# Patient Record
Sex: MALE | Race: OTHER | Employment: UNEMPLOYED | URBAN - METROPOLITAN AREA
[De-identification: names, ages, dates, MRNs, and addresses within clinical notes are randomized per-mention and may not be internally consistent; named-entity substitution may affect disease eponyms.]

---

## 2021-01-01 ENCOUNTER — APPOINTMENT (OUTPATIENT)
Dept: CT IMAGING | Age: 56
DRG: 720 | End: 2021-01-01
Payer: MEDICAID

## 2021-01-01 ENCOUNTER — APPOINTMENT (OUTPATIENT)
Dept: GENERAL RADIOLOGY | Age: 56
DRG: 720 | End: 2021-01-01
Payer: MEDICAID

## 2021-01-01 ENCOUNTER — APPOINTMENT (OUTPATIENT)
Dept: INTERVENTIONAL RADIOLOGY/VASCULAR | Age: 56
DRG: 720 | End: 2021-01-01
Payer: MEDICAID

## 2021-01-01 ENCOUNTER — APPOINTMENT (OUTPATIENT)
Dept: ULTRASOUND IMAGING | Age: 56
DRG: 720 | End: 2021-01-01
Payer: MEDICAID

## 2021-01-01 ENCOUNTER — HOSPITAL ENCOUNTER (INPATIENT)
Age: 56
LOS: 11 days | DRG: 720 | End: 2021-09-21
Attending: EMERGENCY MEDICINE | Admitting: INTERNAL MEDICINE
Payer: MEDICAID

## 2021-01-01 VITALS
DIASTOLIC BLOOD PRESSURE: 30 MMHG | BODY MASS INDEX: 31.77 KG/M2 | SYSTOLIC BLOOD PRESSURE: 59 MMHG | WEIGHT: 190.7 LBS | HEART RATE: 51 BPM | RESPIRATION RATE: 12 BRPM | TEMPERATURE: 99.7 F | OXYGEN SATURATION: 71 % | HEIGHT: 65 IN

## 2021-01-01 DIAGNOSIS — K92.2 GASTROINTESTINAL HEMORRHAGE, UNSPECIFIED GASTROINTESTINAL HEMORRHAGE TYPE: ICD-10-CM

## 2021-01-01 DIAGNOSIS — K72.91 LIVER FAILURE WITH HEPATIC COMA, UNSPECIFIED CHRONICITY (HCC): ICD-10-CM

## 2021-01-01 DIAGNOSIS — K76.82 HEPATIC ENCEPHALOPATHY: Primary | ICD-10-CM

## 2021-01-01 DIAGNOSIS — K82.8 THICKENING OF WALL OF GALLBLADDER: ICD-10-CM

## 2021-01-01 LAB
A/G RATIO: 0.8 (ref 1.1–2.2)
A/G RATIO: 1.3 (ref 1.1–2.2)
A/G RATIO: 1.5 (ref 1.1–2.2)
A/G RATIO: 1.6 (ref 1.1–2.2)
A/G RATIO: 1.7 (ref 1.1–2.2)
A/G RATIO: 1.7 (ref 1.1–2.2)
A/G RATIO: 1.8 (ref 1.1–2.2)
A/G RATIO: 2.3 (ref 1.1–2.2)
A/G RATIO: 2.4 (ref 1.1–2.2)
A/G RATIO: 2.5 (ref 1.1–2.2)
A/G RATIO: 2.6 (ref 1.1–2.2)
A/G RATIO: 3.2 (ref 1.1–2.2)
A/G RATIO: 3.2 (ref 1.1–2.2)
ABO/RH: NORMAL
ACETAMINOPHEN LEVEL: <5 UG/ML (ref 10–30)
ALBUMIN FLUID: 0.6 G/DL
ALBUMIN SERPL-MCNC: 2.2 G/DL (ref 3.4–5)
ALBUMIN SERPL-MCNC: 2.5 G/DL (ref 3.4–5)
ALBUMIN SERPL-MCNC: 2.6 G/DL (ref 3.4–5)
ALBUMIN SERPL-MCNC: 2.6 G/DL (ref 3.4–5)
ALBUMIN SERPL-MCNC: 2.7 G/DL (ref 3.4–5)
ALBUMIN SERPL-MCNC: 2.8 G/DL (ref 3.4–5)
ALBUMIN SERPL-MCNC: 2.9 G/DL (ref 3.4–5)
ALBUMIN SERPL-MCNC: 3.1 G/DL (ref 3.4–5)
ALBUMIN SERPL-MCNC: 3.2 G/DL (ref 3.4–5)
ALBUMIN SERPL-MCNC: 3.3 G/DL (ref 3.4–5)
ALBUMIN SERPL-MCNC: 3.4 G/DL (ref 3.4–5)
ALBUMIN SERPL-MCNC: 3.5 G/DL (ref 3.4–5)
ALBUMIN SERPL-MCNC: 3.5 G/DL (ref 3.4–5)
ALP BLD-CCNC: 149 U/L (ref 40–129)
ALP BLD-CCNC: 162 U/L (ref 40–129)
ALP BLD-CCNC: 164 U/L (ref 40–129)
ALP BLD-CCNC: 181 U/L (ref 40–129)
ALP BLD-CCNC: 182 U/L (ref 40–129)
ALP BLD-CCNC: 182 U/L (ref 40–129)
ALP BLD-CCNC: 183 U/L (ref 40–129)
ALP BLD-CCNC: 184 U/L (ref 40–129)
ALP BLD-CCNC: 192 U/L (ref 40–129)
ALP BLD-CCNC: 202 U/L (ref 40–129)
ALP BLD-CCNC: 217 U/L (ref 40–129)
ALP BLD-CCNC: 247 U/L (ref 40–129)
ALP BLD-CCNC: 312 U/L (ref 40–129)
ALT SERPL-CCNC: 114 U/L (ref 10–40)
ALT SERPL-CCNC: 116 U/L (ref 10–40)
ALT SERPL-CCNC: 120 U/L (ref 10–40)
ALT SERPL-CCNC: 153 U/L (ref 10–40)
ALT SERPL-CCNC: 181 U/L (ref 10–40)
ALT SERPL-CCNC: 183 U/L (ref 10–40)
ALT SERPL-CCNC: 196 U/L (ref 10–40)
ALT SERPL-CCNC: 256 U/L (ref 10–40)
ALT SERPL-CCNC: 91 U/L (ref 10–40)
ALT SERPL-CCNC: 91 U/L (ref 10–40)
ALT SERPL-CCNC: 93 U/L (ref 10–40)
ALT SERPL-CCNC: 96 U/L (ref 10–40)
ALT SERPL-CCNC: 97 U/L (ref 10–40)
AMMONIA: 136 UMOL/L (ref 16–60)
AMMONIA: 45 UMOL/L (ref 16–60)
AMMONIA: 50 UMOL/L (ref 16–60)
AMMONIA: 50 UMOL/L (ref 16–60)
AMMONIA: 55 UMOL/L (ref 16–60)
AMMONIA: 60 UMOL/L (ref 16–60)
AMMONIA: 64 UMOL/L (ref 16–60)
AMMONIA: 64 UMOL/L (ref 16–60)
AMMONIA: 72 UMOL/L (ref 16–60)
AMMONIA: 75 UMOL/L (ref 16–60)
AMMONIA: 76 UMOL/L (ref 16–60)
AMMONIA: 86 UMOL/L (ref 16–60)
AMPHETAMINE SCREEN, URINE: NORMAL
ANION GAP SERPL CALCULATED.3IONS-SCNC: 11 MMOL/L (ref 3–16)
ANION GAP SERPL CALCULATED.3IONS-SCNC: 11 MMOL/L (ref 3–16)
ANION GAP SERPL CALCULATED.3IONS-SCNC: 13 MMOL/L (ref 3–16)
ANION GAP SERPL CALCULATED.3IONS-SCNC: 14 MMOL/L (ref 3–16)
ANION GAP SERPL CALCULATED.3IONS-SCNC: 15 MMOL/L (ref 3–16)
ANION GAP SERPL CALCULATED.3IONS-SCNC: 16 MMOL/L (ref 3–16)
ANION GAP SERPL CALCULATED.3IONS-SCNC: 18 MMOL/L (ref 3–16)
ANION GAP SERPL CALCULATED.3IONS-SCNC: 23 MMOL/L (ref 3–16)
ANION GAP SERPL CALCULATED.3IONS-SCNC: 27 MMOL/L (ref 3–16)
ANISOCYTOSIS: ABNORMAL
ANTIBODY SCREEN: NORMAL
APPEARANCE FLUID: CLEAR
AST SERPL-CCNC: 103 U/L (ref 15–37)
AST SERPL-CCNC: 104 U/L (ref 15–37)
AST SERPL-CCNC: 116 U/L (ref 15–37)
AST SERPL-CCNC: 157 U/L (ref 15–37)
AST SERPL-CCNC: 161 U/L (ref 15–37)
AST SERPL-CCNC: 176 U/L (ref 15–37)
AST SERPL-CCNC: 180 U/L (ref 15–37)
AST SERPL-CCNC: 215 U/L (ref 15–37)
AST SERPL-CCNC: 220 U/L (ref 15–37)
AST SERPL-CCNC: 258 U/L (ref 15–37)
AST SERPL-CCNC: 286 U/L (ref 15–37)
AST SERPL-CCNC: 292 U/L (ref 15–37)
AST SERPL-CCNC: 464 U/L (ref 15–37)
BACTERIA: ABNORMAL /HPF
BANDED NEUTROPHILS RELATIVE PERCENT: 1 % (ref 0–7)
BANDED NEUTROPHILS RELATIVE PERCENT: 10 % (ref 0–7)
BANDED NEUTROPHILS RELATIVE PERCENT: 11 % (ref 0–7)
BANDED NEUTROPHILS RELATIVE PERCENT: 3 % (ref 0–7)
BANDED NEUTROPHILS RELATIVE PERCENT: 3 % (ref 0–7)
BANDED NEUTROPHILS RELATIVE PERCENT: 4 % (ref 0–7)
BARBITURATE SCREEN URINE: NORMAL
BASE EXCESS ARTERIAL: -11 MMOL/L (ref -3–3)
BASE EXCESS ARTERIAL: -3.4 MMOL/L (ref -3–3)
BASE EXCESS ARTERIAL: -4 MMOL/L (ref -3–3)
BASE EXCESS ARTERIAL: -4 MMOL/L (ref -3–3)
BASE EXCESS ARTERIAL: -4.1 MMOL/L (ref -3–3)
BASE EXCESS ARTERIAL: -4.1 MMOL/L (ref -3–3)
BASE EXCESS ARTERIAL: -4.7 MMOL/L (ref -3–3)
BASE EXCESS ARTERIAL: -5 MMOL/L (ref -3–3)
BASE EXCESS ARTERIAL: -5.8 MMOL/L (ref -3–3)
BASE EXCESS ARTERIAL: -6.7 MMOL/L (ref -3–3)
BASE EXCESS ARTERIAL: -7.3 MMOL/L (ref -3–3)
BASE EXCESS ARTERIAL: -9.1 MMOL/L (ref -3–3)
BASE EXCESS VENOUS: -11.1 MMOL/L (ref -3–3)
BASOPHILIC STIPPLING: ABNORMAL
BASOPHILIC STIPPLING: ABNORMAL
BASOPHILS ABSOLUTE: 0 K/UL (ref 0–0.2)
BASOPHILS ABSOLUTE: 0.1 K/UL (ref 0–0.2)
BASOPHILS ABSOLUTE: 0.1 K/UL (ref 0–0.2)
BASOPHILS RELATIVE PERCENT: 0 %
BASOPHILS RELATIVE PERCENT: 0.1 %
BASOPHILS RELATIVE PERCENT: 0.3 %
BASOPHILS RELATIVE PERCENT: 1 %
BENZODIAZEPINE SCREEN, URINE: NORMAL
BILIRUB SERPL-MCNC: 24.5 MG/DL (ref 0–1)
BILIRUB SERPL-MCNC: 25.3 MG/DL (ref 0–1)
BILIRUB SERPL-MCNC: 25.5 MG/DL (ref 0–1)
BILIRUB SERPL-MCNC: 25.5 MG/DL (ref 0–1)
BILIRUB SERPL-MCNC: 26.2 MG/DL (ref 0–1)
BILIRUB SERPL-MCNC: 27.1 MG/DL (ref 0–1)
BILIRUB SERPL-MCNC: 27.2 MG/DL (ref 0–1)
BILIRUB SERPL-MCNC: 27.7 MG/DL (ref 0–1)
BILIRUB SERPL-MCNC: 27.8 MG/DL (ref 0–1)
BILIRUB SERPL-MCNC: 27.9 MG/DL (ref 0–1)
BILIRUB SERPL-MCNC: 28.2 MG/DL (ref 0–1)
BILIRUB SERPL-MCNC: 30.5 MG/DL (ref 0–1)
BILIRUB SERPL-MCNC: 31.1 MG/DL (ref 0–1)
BILIRUBIN URINE: ABNORMAL
BLOOD BANK DISPENSE STATUS: NORMAL
BLOOD BANK PRODUCT CODE: NORMAL
BLOOD CULTURE, ROUTINE: ABNORMAL
BLOOD CULTURE, ROUTINE: ABNORMAL
BLOOD, URINE: ABNORMAL
BODY FLUID CULTURE, STERILE: NORMAL
BPU ID: NORMAL
BUN BLDV-MCNC: 54 MG/DL (ref 7–20)
BUN BLDV-MCNC: 55 MG/DL (ref 7–20)
BUN BLDV-MCNC: 57 MG/DL (ref 7–20)
BUN BLDV-MCNC: 58 MG/DL (ref 7–20)
BUN BLDV-MCNC: 59 MG/DL (ref 7–20)
BUN BLDV-MCNC: 59 MG/DL (ref 7–20)
BUN BLDV-MCNC: 67 MG/DL (ref 7–20)
BUN BLDV-MCNC: 70 MG/DL (ref 7–20)
BUN BLDV-MCNC: 70 MG/DL (ref 7–20)
BUN BLDV-MCNC: 71 MG/DL (ref 7–20)
BUN BLDV-MCNC: 72 MG/DL (ref 7–20)
BUN BLDV-MCNC: 75 MG/DL (ref 7–20)
BUN BLDV-MCNC: 79 MG/DL (ref 7–20)
BUN BLDV-MCNC: 87 MG/DL (ref 7–20)
BURR CELLS: ABNORMAL
C DIFF TOXIN/ANTIGEN: NORMAL
CALCIUM SERPL-MCNC: 6.9 MG/DL (ref 8.3–10.6)
CALCIUM SERPL-MCNC: 7.4 MG/DL (ref 8.3–10.6)
CALCIUM SERPL-MCNC: 7.5 MG/DL (ref 8.3–10.6)
CALCIUM SERPL-MCNC: 7.6 MG/DL (ref 8.3–10.6)
CALCIUM SERPL-MCNC: 7.8 MG/DL (ref 8.3–10.6)
CALCIUM SERPL-MCNC: 8.2 MG/DL (ref 8.3–10.6)
CALCIUM SERPL-MCNC: 9 MG/DL (ref 8.3–10.6)
CALCIUM SERPL-MCNC: 9.3 MG/DL (ref 8.3–10.6)
CALCIUM SERPL-MCNC: 9.5 MG/DL (ref 8.3–10.6)
CALCIUM SERPL-MCNC: 9.5 MG/DL (ref 8.3–10.6)
CALCIUM SERPL-MCNC: 9.6 MG/DL (ref 8.3–10.6)
CALCIUM SERPL-MCNC: 9.6 MG/DL (ref 8.3–10.6)
CALCIUM SERPL-MCNC: 9.7 MG/DL (ref 8.3–10.6)
CALCIUM SERPL-MCNC: 9.7 MG/DL (ref 8.3–10.6)
CANNABINOID SCREEN URINE: NORMAL
CARBOXYHEMOGLOBIN ARTERIAL: 0.8 % (ref 0–1.5)
CARBOXYHEMOGLOBIN ARTERIAL: 1 % (ref 0–1.5)
CARBOXYHEMOGLOBIN ARTERIAL: 1 % (ref 0–1.5)
CARBOXYHEMOGLOBIN ARTERIAL: 1.1 % (ref 0–1.5)
CARBOXYHEMOGLOBIN ARTERIAL: 1.2 % (ref 0–1.5)
CARBOXYHEMOGLOBIN ARTERIAL: 1.4 % (ref 0–1.5)
CARBOXYHEMOGLOBIN ARTERIAL: 1.4 % (ref 0–1.5)
CARBOXYHEMOGLOBIN ARTERIAL: 1.6 % (ref 0–1.5)
CARBOXYHEMOGLOBIN ARTERIAL: 1.6 % (ref 0–1.5)
CARBOXYHEMOGLOBIN ARTERIAL: 1.7 % (ref 0–1.5)
CARBOXYHEMOGLOBIN: 3.6 % (ref 0–1.5)
CELL COUNT FLUID TYPE: NORMAL
CELLULAR CASTS: ABNORMAL /LPF
CHLORIDE BLD-SCNC: 102 MMOL/L (ref 99–110)
CHLORIDE BLD-SCNC: 103 MMOL/L (ref 99–110)
CHLORIDE BLD-SCNC: 108 MMOL/L (ref 99–110)
CHLORIDE BLD-SCNC: 111 MMOL/L (ref 99–110)
CHLORIDE BLD-SCNC: 116 MMOL/L (ref 99–110)
CHLORIDE BLD-SCNC: 117 MMOL/L (ref 99–110)
CHLORIDE BLD-SCNC: 119 MMOL/L (ref 99–110)
CHLORIDE BLD-SCNC: 121 MMOL/L (ref 99–110)
CHLORIDE BLD-SCNC: 122 MMOL/L (ref 99–110)
CHLORIDE BLD-SCNC: 82 MMOL/L (ref 99–110)
CHLORIDE BLD-SCNC: 82 MMOL/L (ref 99–110)
CHLORIDE BLD-SCNC: 93 MMOL/L (ref 99–110)
CHLORIDE BLD-SCNC: 98 MMOL/L (ref 99–110)
CHLORIDE BLD-SCNC: 99 MMOL/L (ref 99–110)
CLARITY: ABNORMAL
CLOT EVALUATION: NORMAL
CO2: 13 MMOL/L (ref 21–32)
CO2: 14 MMOL/L (ref 21–32)
CO2: 16 MMOL/L (ref 21–32)
CO2: 17 MMOL/L (ref 21–32)
CO2: 19 MMOL/L (ref 21–32)
CO2: 20 MMOL/L (ref 21–32)
CO2: 21 MMOL/L (ref 21–32)
COCAINE METABOLITE SCREEN URINE: NORMAL
COLOR FLUID: YELLOW
COLOR: ABNORMAL
COMMENT UA: ABNORMAL
CREAT SERPL-MCNC: 1.1 MG/DL (ref 0.9–1.3)
CREAT SERPL-MCNC: 1.2 MG/DL (ref 0.9–1.3)
CREAT SERPL-MCNC: 1.7 MG/DL (ref 0.9–1.3)
CREAT SERPL-MCNC: 1.9 MG/DL (ref 0.9–1.3)
CREAT SERPL-MCNC: 2 MG/DL (ref 0.9–1.3)
CREAT SERPL-MCNC: 2 MG/DL (ref 0.9–1.3)
CREAT SERPL-MCNC: 2.2 MG/DL (ref 0.9–1.3)
CREAT SERPL-MCNC: 2.3 MG/DL (ref 0.9–1.3)
CREAT SERPL-MCNC: 2.3 MG/DL (ref 0.9–1.3)
CREAT SERPL-MCNC: 2.7 MG/DL (ref 0.9–1.3)
CREAT SERPL-MCNC: 2.8 MG/DL (ref 0.9–1.3)
CREAT SERPL-MCNC: 3.2 MG/DL (ref 0.9–1.3)
CREAT SERPL-MCNC: 3.3 MG/DL (ref 0.9–1.3)
CREAT SERPL-MCNC: 3.4 MG/DL (ref 0.9–1.3)
CULTURE, BLOOD 2: ABNORMAL
CULTURE, BLOOD 2: NORMAL
DESCRIPTION BLOOD BANK: NORMAL
EKG ATRIAL RATE: 65 BPM
EKG ATRIAL RATE: 66 BPM
EKG ATRIAL RATE: 82 BPM
EKG DIAGNOSIS: NORMAL
EKG P AXIS: 23 DEGREES
EKG P AXIS: 28 DEGREES
EKG P AXIS: 38 DEGREES
EKG P-R INTERVAL: 140 MS
EKG P-R INTERVAL: 144 MS
EKG P-R INTERVAL: 150 MS
EKG Q-T INTERVAL: 414 MS
EKG Q-T INTERVAL: 430 MS
EKG Q-T INTERVAL: 436 MS
EKG QRS DURATION: 110 MS
EKG QRS DURATION: 116 MS
EKG QRS DURATION: 122 MS
EKG QTC CALCULATION (BAZETT): 447 MS
EKG QTC CALCULATION (BAZETT): 457 MS
EKG QTC CALCULATION (BAZETT): 483 MS
EKG R AXIS: -11 DEGREES
EKG R AXIS: -12 DEGREES
EKG R AXIS: -12 DEGREES
EKG T AXIS: 14 DEGREES
EKG T AXIS: 16 DEGREES
EKG T AXIS: 72 DEGREES
EKG VENTRICULAR RATE: 65 BPM
EKG VENTRICULAR RATE: 66 BPM
EKG VENTRICULAR RATE: 82 BPM
EOSINOPHILS ABSOLUTE: 0 K/UL (ref 0–0.6)
EOSINOPHILS ABSOLUTE: 0.1 K/UL (ref 0–0.6)
EOSINOPHILS ABSOLUTE: 0.1 K/UL (ref 0–0.6)
EOSINOPHILS ABSOLUTE: 0.2 K/UL (ref 0–0.6)
EOSINOPHILS ABSOLUTE: 0.2 K/UL (ref 0–0.6)
EOSINOPHILS ABSOLUTE: 0.3 K/UL (ref 0–0.6)
EOSINOPHILS ABSOLUTE: 0.3 K/UL (ref 0–0.6)
EOSINOPHILS ABSOLUTE: 0.8 K/UL (ref 0–0.6)
EOSINOPHILS RELATIVE PERCENT: 0 %
EOSINOPHILS RELATIVE PERCENT: 0.1 %
EOSINOPHILS RELATIVE PERCENT: 0.3 %
EOSINOPHILS RELATIVE PERCENT: 0.3 %
EOSINOPHILS RELATIVE PERCENT: 0.6 %
EOSINOPHILS RELATIVE PERCENT: 0.7 %
EOSINOPHILS RELATIVE PERCENT: 1 %
EOSINOPHILS RELATIVE PERCENT: 1 %
EOSINOPHILS RELATIVE PERCENT: 3 %
EOSINOPHILS RELATIVE PERCENT: 4 %
EPITHELIAL CELLS, UA: ABNORMAL /HPF (ref 0–5)
ETHANOL: NORMAL MG/DL (ref 0–0.08)
FLUID TYPE: NORMAL
GFR AFRICAN AMERICAN: 23
GFR AFRICAN AMERICAN: 24
GFR AFRICAN AMERICAN: 24
GFR AFRICAN AMERICAN: 29
GFR AFRICAN AMERICAN: 30
GFR AFRICAN AMERICAN: 36
GFR AFRICAN AMERICAN: 36
GFR AFRICAN AMERICAN: 38
GFR AFRICAN AMERICAN: 42
GFR AFRICAN AMERICAN: 42
GFR AFRICAN AMERICAN: 45
GFR AFRICAN AMERICAN: 51
GFR AFRICAN AMERICAN: >60
GFR AFRICAN AMERICAN: >60
GFR NON-AFRICAN AMERICAN: 19
GFR NON-AFRICAN AMERICAN: 20
GFR NON-AFRICAN AMERICAN: 20
GFR NON-AFRICAN AMERICAN: 24
GFR NON-AFRICAN AMERICAN: 25
GFR NON-AFRICAN AMERICAN: 30
GFR NON-AFRICAN AMERICAN: 30
GFR NON-AFRICAN AMERICAN: 31
GFR NON-AFRICAN AMERICAN: 35
GFR NON-AFRICAN AMERICAN: 35
GFR NON-AFRICAN AMERICAN: 37
GFR NON-AFRICAN AMERICAN: 42
GFR NON-AFRICAN AMERICAN: >60
GFR NON-AFRICAN AMERICAN: >60
GLOBULIN: 1.1 G/DL
GLOBULIN: 1.1 G/DL
GLOBULIN: 1.2 G/DL
GLOBULIN: 1.3 G/DL
GLOBULIN: 1.3 G/DL
GLOBULIN: 1.4 G/DL
GLOBULIN: 1.5 G/DL
GLOBULIN: 1.6 G/DL
GLOBULIN: 1.6 G/DL
GLOBULIN: 1.7 G/DL
GLOBULIN: 1.9 G/DL
GLOBULIN: 1.9 G/DL
GLOBULIN: 2.6 G/DL
GLUCOSE BLD-MCNC: 101 MG/DL (ref 70–99)
GLUCOSE BLD-MCNC: 104 MG/DL (ref 70–99)
GLUCOSE BLD-MCNC: 107 MG/DL (ref 70–99)
GLUCOSE BLD-MCNC: 110 MG/DL (ref 70–99)
GLUCOSE BLD-MCNC: 118 MG/DL (ref 70–99)
GLUCOSE BLD-MCNC: 120 MG/DL (ref 70–99)
GLUCOSE BLD-MCNC: 127 MG/DL (ref 70–99)
GLUCOSE BLD-MCNC: 128 MG/DL (ref 70–99)
GLUCOSE BLD-MCNC: 149 MG/DL (ref 70–99)
GLUCOSE BLD-MCNC: 153 MG/DL (ref 70–99)
GLUCOSE BLD-MCNC: 164 MG/DL (ref 70–99)
GLUCOSE BLD-MCNC: 46 MG/DL (ref 70–99)
GLUCOSE BLD-MCNC: 58 MG/DL (ref 70–99)
GLUCOSE BLD-MCNC: 85 MG/DL (ref 70–99)
GLUCOSE BLD-MCNC: 92 MG/DL (ref 70–99)
GLUCOSE BLD-MCNC: 99 MG/DL (ref 70–99)
GLUCOSE URINE: 100 MG/DL
GRAM STAIN RESULT: NORMAL
HAV IGM SER IA-ACNC: NORMAL
HCO3 ARTERIAL: 16.1 MMOL/L (ref 21–29)
HCO3 ARTERIAL: 17.3 MMOL/L (ref 21–29)
HCO3 ARTERIAL: 17.8 MMOL/L (ref 21–29)
HCO3 ARTERIAL: 18.7 MMOL/L (ref 21–29)
HCO3 ARTERIAL: 19.4 MMOL/L (ref 21–29)
HCO3 ARTERIAL: 19.9 MMOL/L (ref 21–29)
HCO3 ARTERIAL: 20.5 MMOL/L (ref 21–29)
HCO3 ARTERIAL: 20.7 MMOL/L (ref 21–29)
HCO3 ARTERIAL: 21.3 MMOL/L (ref 21–29)
HCO3 ARTERIAL: 21.4 MMOL/L (ref 21–29)
HCO3 ARTERIAL: 21.4 MMOL/L (ref 21–29)
HCO3 ARTERIAL: 21.5 MMOL/L (ref 21–29)
HCO3 VENOUS: 14.3 MMOL/L (ref 23–29)
HCT VFR BLD CALC: 17 % (ref 40.5–52.5)
HCT VFR BLD CALC: 19.3 % (ref 40.5–52.5)
HCT VFR BLD CALC: 20.5 % (ref 40.5–52.5)
HCT VFR BLD CALC: 20.9 % (ref 40.5–52.5)
HCT VFR BLD CALC: 21 % (ref 40.5–52.5)
HCT VFR BLD CALC: 21.3 % (ref 40.5–52.5)
HCT VFR BLD CALC: 21.3 % (ref 40.5–52.5)
HCT VFR BLD CALC: 21.7 % (ref 40.5–52.5)
HCT VFR BLD CALC: 21.8 % (ref 40.5–52.5)
HCT VFR BLD CALC: 21.9 % (ref 40.5–52.5)
HCT VFR BLD CALC: 21.9 % (ref 40.5–52.5)
HCT VFR BLD CALC: 22.1 % (ref 40.5–52.5)
HCT VFR BLD CALC: 22.1 % (ref 40.5–52.5)
HCT VFR BLD CALC: 22.3 % (ref 40.5–52.5)
HCT VFR BLD CALC: 22.4 % (ref 40.5–52.5)
HCT VFR BLD CALC: 22.4 % (ref 40.5–52.5)
HCT VFR BLD CALC: 22.5 % (ref 40.5–52.5)
HCT VFR BLD CALC: 22.5 % (ref 40.5–52.5)
HCT VFR BLD CALC: 22.6 % (ref 40.5–52.5)
HCT VFR BLD CALC: 22.9 % (ref 40.5–52.5)
HCT VFR BLD CALC: 23.3 % (ref 40.5–52.5)
HCT VFR BLD CALC: 24.7 % (ref 40.5–52.5)
HCT VFR BLD CALC: 25.5 % (ref 40.5–52.5)
HCT VFR BLD CALC: 35 % (ref 40.5–52.5)
HCT VFR BLD CALC: 35.1 % (ref 40.5–52.5)
HEMOGLOBIN, ART, EXTENDED: 6 G/DL (ref 13.5–17.5)
HEMOGLOBIN, ART, EXTENDED: 6.5 G/DL (ref 13.5–17.5)
HEMOGLOBIN, ART, EXTENDED: 6.6 G/DL (ref 13.5–17.5)
HEMOGLOBIN, ART, EXTENDED: 7 G/DL (ref 13.5–17.5)
HEMOGLOBIN, ART, EXTENDED: 7.1 G/DL (ref 13.5–17.5)
HEMOGLOBIN, ART, EXTENDED: 7.2 G/DL (ref 13.5–17.5)
HEMOGLOBIN, ART, EXTENDED: 7.4 G/DL (ref 13.5–17.5)
HEMOGLOBIN, ART, EXTENDED: 7.4 G/DL (ref 13.5–17.5)
HEMOGLOBIN, ART, EXTENDED: 7.5 G/DL (ref 13.5–17.5)
HEMOGLOBIN, ART, EXTENDED: 7.6 G/DL (ref 13.5–17.5)
HEMOGLOBIN, ART, EXTENDED: 8.3 G/DL (ref 13.5–17.5)
HEMOGLOBIN, ART, EXTENDED: 9.5 G/DL (ref 13.5–17.5)
HEMOGLOBIN, VEN, REDUCED: 3 %
HEMOGLOBIN: 12.1 G/DL (ref 13.5–17.5)
HEMOGLOBIN: 12.2 G/DL (ref 13.5–17.5)
HEMOGLOBIN: 6 G/DL (ref 13.5–17.5)
HEMOGLOBIN: 6.9 G/DL (ref 13.5–17.5)
HEMOGLOBIN: 7.2 G/DL (ref 13.5–17.5)
HEMOGLOBIN: 7.2 G/DL (ref 13.5–17.5)
HEMOGLOBIN: 7.3 G/DL (ref 13.5–17.5)
HEMOGLOBIN: 7.3 G/DL (ref 13.5–17.5)
HEMOGLOBIN: 7.4 G/DL (ref 13.5–17.5)
HEMOGLOBIN: 7.6 G/DL (ref 13.5–17.5)
HEMOGLOBIN: 7.8 G/DL (ref 13.5–17.5)
HEMOGLOBIN: 7.9 G/DL (ref 13.5–17.5)
HEMOGLOBIN: 8 G/DL (ref 13.5–17.5)
HEMOGLOBIN: 8.1 G/DL (ref 13.5–17.5)
HEMOGLOBIN: 8.4 G/DL (ref 13.5–17.5)
HEMOGLOBIN: 8.6 G/DL (ref 13.5–17.5)
HEPATITIS B CORE IGM ANTIBODY: NORMAL
HEPATITIS B SURFACE ANTIGEN INTERPRETATION: NORMAL
HEPATITIS C ANTIBODY INTERPRETATION: NORMAL
HIV AG/AB: NORMAL
HIV ANTIGEN: NORMAL
HIV-1 ANTIBODY: NORMAL
HIV-2 AB: NORMAL
HYALINE CASTS: ABNORMAL /LPF (ref 0–2)
HYPOCHROMIA: ABNORMAL
INR BLD: 2.28 (ref 0.88–1.12)
INR BLD: 2.6 (ref 0.88–1.12)
INR BLD: 2.68 (ref 0.88–1.12)
INR BLD: 2.76 (ref 0.88–1.12)
INR BLD: 2.81 (ref 0.88–1.12)
INR BLD: 2.85 (ref 0.88–1.12)
INR BLD: 2.88 (ref 0.88–1.12)
INR BLD: 2.94 (ref 0.88–1.12)
INR BLD: 3.01 (ref 0.88–1.12)
INR BLD: 3.03 (ref 0.88–1.12)
INR BLD: 3.04 (ref 0.88–1.12)
INR BLD: 4.12 (ref 0.88–1.12)
INR BLD: 4.16 (ref 0.88–1.12)
KETONES, URINE: ABNORMAL MG/DL
LACTIC ACID, SEPSIS: 8.5 MMOL/L (ref 0.4–1.9)
LACTIC ACID: 1 MMOL/L (ref 0.4–2)
LACTIC ACID: 1.1 MMOL/L (ref 0.4–2)
LACTIC ACID: 1.2 MMOL/L (ref 0.4–2)
LACTIC ACID: 1.3 MMOL/L (ref 0.4–2)
LACTIC ACID: 1.4 MMOL/L (ref 0.4–2)
LACTIC ACID: 1.4 MMOL/L (ref 0.4–2)
LACTIC ACID: 1.5 MMOL/L (ref 0.4–2)
LACTIC ACID: 1.6 MMOL/L (ref 0.4–2)
LACTIC ACID: 1.6 MMOL/L (ref 0.4–2)
LACTIC ACID: 4.7 MMOL/L (ref 0.4–2)
LEUKOCYTE ESTERASE, URINE: NEGATIVE
LIPASE: 166 U/L (ref 13–60)
LYMPHOCYTES ABSOLUTE: 0.3 K/UL (ref 1–5.1)
LYMPHOCYTES ABSOLUTE: 0.5 K/UL (ref 1–5.1)
LYMPHOCYTES ABSOLUTE: 0.5 K/UL (ref 1–5.1)
LYMPHOCYTES ABSOLUTE: 0.6 K/UL (ref 1–5.1)
LYMPHOCYTES ABSOLUTE: 0.7 K/UL (ref 1–5.1)
LYMPHOCYTES ABSOLUTE: 0.8 K/UL (ref 1–5.1)
LYMPHOCYTES ABSOLUTE: 0.8 K/UL (ref 1–5.1)
LYMPHOCYTES ABSOLUTE: 0.9 K/UL (ref 1–5.1)
LYMPHOCYTES ABSOLUTE: 1 K/UL (ref 1–5.1)
LYMPHOCYTES ABSOLUTE: 1.2 K/UL (ref 1–5.1)
LYMPHOCYTES ABSOLUTE: 2.6 K/UL (ref 1–5.1)
LYMPHOCYTES RELATIVE PERCENT: 2 %
LYMPHOCYTES RELATIVE PERCENT: 2.3 %
LYMPHOCYTES RELATIVE PERCENT: 2.7 %
LYMPHOCYTES RELATIVE PERCENT: 3 %
LYMPHOCYTES RELATIVE PERCENT: 3 %
LYMPHOCYTES RELATIVE PERCENT: 3.3 %
LYMPHOCYTES RELATIVE PERCENT: 3.6 %
LYMPHOCYTES RELATIVE PERCENT: 4 %
LYMPHOCYTES RELATIVE PERCENT: 4 %
LYMPHOCYTES RELATIVE PERCENT: 4.1 %
LYMPHOCYTES RELATIVE PERCENT: 6 %
LYMPHOCYTES RELATIVE PERCENT: 6 %
LYMPHOCYTES RELATIVE PERCENT: 8 %
LYMPHOCYTES, BODY FLUID: 24 %
Lab: NORMAL
MACROCYTES: ABNORMAL
MACROCYTES: ABNORMAL
MAGNESIUM: 1.8 MG/DL (ref 1.8–2.4)
MAGNESIUM: 1.8 MG/DL (ref 1.8–2.4)
MAGNESIUM: 1.9 MG/DL (ref 1.8–2.4)
MAGNESIUM: 1.9 MG/DL (ref 1.8–2.4)
MAGNESIUM: 2 MG/DL (ref 1.8–2.4)
MAGNESIUM: 2.1 MG/DL (ref 1.8–2.4)
MAGNESIUM: 2.2 MG/DL (ref 1.8–2.4)
MAGNESIUM: 2.3 MG/DL (ref 1.8–2.4)
MAGNESIUM: 2.3 MG/DL (ref 1.8–2.4)
MCH RBC QN AUTO: 31.8 PG (ref 26–34)
MCH RBC QN AUTO: 32.1 PG (ref 26–34)
MCH RBC QN AUTO: 32.1 PG (ref 26–34)
MCH RBC QN AUTO: 32.3 PG (ref 26–34)
MCH RBC QN AUTO: 32.4 PG (ref 26–34)
MCH RBC QN AUTO: 32.4 PG (ref 26–34)
MCH RBC QN AUTO: 32.6 PG (ref 26–34)
MCH RBC QN AUTO: 32.6 PG (ref 26–34)
MCH RBC QN AUTO: 32.7 PG (ref 26–34)
MCH RBC QN AUTO: 33 PG (ref 26–34)
MCH RBC QN AUTO: 33.8 PG (ref 26–34)
MCH RBC QN AUTO: 33.9 PG (ref 26–34)
MCH RBC QN AUTO: 37.4 PG (ref 26–34)
MCHC RBC AUTO-ENTMCNC: 33.7 G/DL (ref 31–36)
MCHC RBC AUTO-ENTMCNC: 34.2 G/DL (ref 31–36)
MCHC RBC AUTO-ENTMCNC: 34.2 G/DL (ref 31–36)
MCHC RBC AUTO-ENTMCNC: 34.6 G/DL (ref 31–36)
MCHC RBC AUTO-ENTMCNC: 34.6 G/DL (ref 31–36)
MCHC RBC AUTO-ENTMCNC: 34.7 G/DL (ref 31–36)
MCHC RBC AUTO-ENTMCNC: 34.8 G/DL (ref 31–36)
MCHC RBC AUTO-ENTMCNC: 34.8 G/DL (ref 31–36)
MCHC RBC AUTO-ENTMCNC: 34.9 G/DL (ref 31–36)
MCHC RBC AUTO-ENTMCNC: 35.2 G/DL (ref 31–36)
MCHC RBC AUTO-ENTMCNC: 35.5 G/DL (ref 31–36)
MCHC RBC AUTO-ENTMCNC: 35.6 G/DL (ref 31–36)
MCHC RBC AUTO-ENTMCNC: 35.6 G/DL (ref 31–36)
MCV RBC AUTO: 108.1 FL (ref 80–100)
MCV RBC AUTO: 91 FL (ref 80–100)
MCV RBC AUTO: 92 FL (ref 80–100)
MCV RBC AUTO: 92.5 FL (ref 80–100)
MCV RBC AUTO: 92.9 FL (ref 80–100)
MCV RBC AUTO: 93.4 FL (ref 80–100)
MCV RBC AUTO: 93.8 FL (ref 80–100)
MCV RBC AUTO: 93.9 FL (ref 80–100)
MCV RBC AUTO: 94 FL (ref 80–100)
MCV RBC AUTO: 94.2 FL (ref 80–100)
MCV RBC AUTO: 94.5 FL (ref 80–100)
MCV RBC AUTO: 95.5 FL (ref 80–100)
MCV RBC AUTO: 97.7 FL (ref 80–100)
METAMYELOCYTES RELATIVE PERCENT: 1 %
METAMYELOCYTES RELATIVE PERCENT: 1 %
METHADONE SCREEN, URINE: NORMAL
METHEMOGLOBIN ARTERIAL: 0 %
METHEMOGLOBIN ARTERIAL: 0.1 %
METHEMOGLOBIN ARTERIAL: 0.6 %
METHEMOGLOBIN VENOUS: 0 %
MICROSCOPIC EXAMINATION: YES
MONOCYTE, FLUID: 59 %
MONOCYTES ABSOLUTE: 0 K/UL (ref 0–1.3)
MONOCYTES ABSOLUTE: 0.4 K/UL (ref 0–1.3)
MONOCYTES ABSOLUTE: 0.7 K/UL (ref 0–1.3)
MONOCYTES ABSOLUTE: 0.9 K/UL (ref 0–1.3)
MONOCYTES ABSOLUTE: 1.1 K/UL (ref 0–1.3)
MONOCYTES ABSOLUTE: 1.3 K/UL (ref 0–1.3)
MONOCYTES ABSOLUTE: 1.3 K/UL (ref 0–1.3)
MONOCYTES ABSOLUTE: 2.2 K/UL (ref 0–1.3)
MONOCYTES ABSOLUTE: 3 K/UL (ref 0–1.3)
MONOCYTES RELATIVE PERCENT: 0 %
MONOCYTES RELATIVE PERCENT: 1.7 %
MONOCYTES RELATIVE PERCENT: 1.7 %
MONOCYTES RELATIVE PERCENT: 11 %
MONOCYTES RELATIVE PERCENT: 2 %
MONOCYTES RELATIVE PERCENT: 2.8 %
MONOCYTES RELATIVE PERCENT: 2.8 %
MONOCYTES RELATIVE PERCENT: 3.4 %
MONOCYTES RELATIVE PERCENT: 4 %
MONOCYTES RELATIVE PERCENT: 4 %
MONOCYTES RELATIVE PERCENT: 6 %
MONOCYTES RELATIVE PERCENT: 7 %
MONOCYTES RELATIVE PERCENT: 8 %
MRSA SCREEN RT-PCR: NORMAL
NEUTROPHIL, FLUID: 17 %
NEUTROPHILS ABSOLUTE: 13.2 K/UL (ref 1.7–7.7)
NEUTROPHILS ABSOLUTE: 13.4 K/UL (ref 1.7–7.7)
NEUTROPHILS ABSOLUTE: 17.1 K/UL (ref 1.7–7.7)
NEUTROPHILS ABSOLUTE: 17.7 K/UL (ref 1.7–7.7)
NEUTROPHILS ABSOLUTE: 18.5 K/UL (ref 1.7–7.7)
NEUTROPHILS ABSOLUTE: 18.6 K/UL (ref 1.7–7.7)
NEUTROPHILS ABSOLUTE: 21.2 K/UL (ref 1.7–7.7)
NEUTROPHILS ABSOLUTE: 22.1 K/UL (ref 1.7–7.7)
NEUTROPHILS ABSOLUTE: 23.9 K/UL (ref 1.7–7.7)
NEUTROPHILS ABSOLUTE: 24.5 K/UL (ref 1.7–7.7)
NEUTROPHILS ABSOLUTE: 30.8 K/UL (ref 1.7–7.7)
NEUTROPHILS ABSOLUTE: 37.3 K/UL (ref 1.7–7.7)
NEUTROPHILS ABSOLUTE: 8.8 K/UL (ref 1.7–7.7)
NEUTROPHILS RELATIVE PERCENT: 67 %
NEUTROPHILS RELATIVE PERCENT: 83 %
NEUTROPHILS RELATIVE PERCENT: 83 %
NEUTROPHILS RELATIVE PERCENT: 85 %
NEUTROPHILS RELATIVE PERCENT: 88 %
NEUTROPHILS RELATIVE PERCENT: 88 %
NEUTROPHILS RELATIVE PERCENT: 90 %
NEUTROPHILS RELATIVE PERCENT: 92 %
NEUTROPHILS RELATIVE PERCENT: 93.3 %
NEUTROPHILS RELATIVE PERCENT: 93.5 %
NEUTROPHILS RELATIVE PERCENT: 93.5 %
NEUTROPHILS RELATIVE PERCENT: 94 %
NEUTROPHILS RELATIVE PERCENT: 94.3 %
NITRITE, URINE: NEGATIVE
NUCLEATED CELLS FLUID: 118 /CUMM
NUCLEATED RED BLOOD CELLS: 2 /100 WBC
NUMBER OF CELLS COUNTED FLUID: 100
O2 CONTENT, VEN: 10 VOL %
O2 SAT, ARTERIAL: 100 %
O2 SAT, ARTERIAL: 100 %
O2 SAT, ARTERIAL: 93.8 %
O2 SAT, ARTERIAL: 98.1 %
O2 SAT, ARTERIAL: 98.7 %
O2 SAT, ARTERIAL: 98.9 %
O2 SAT, ARTERIAL: 99.3 %
O2 SAT, ARTERIAL: 99.7 %
O2 SAT, ARTERIAL: 99.7 %
O2 SAT, ARTERIAL: 99.8 %
O2 SAT, ARTERIAL: 99.8 %
O2 SAT, ARTERIAL: 99.9 %
O2 SAT, VEN: 97 %
O2 THERAPY: ABNORMAL
OPIATE SCREEN URINE: NORMAL
ORGANISM: ABNORMAL
ORGANISM: ABNORMAL
OSMOLALITY URINE: 294 MOSM/KG (ref 390–1070)
OSMOLALITY: 276 MOSM/KG (ref 275–295)
OXYCODONE URINE: NORMAL
PCO2 ARTERIAL: 33.2 MMHG (ref 35–45)
PCO2 ARTERIAL: 35 MMHG (ref 35–45)
PCO2 ARTERIAL: 35.3 MMHG (ref 35–45)
PCO2 ARTERIAL: 35.9 MMHG (ref 35–45)
PCO2 ARTERIAL: 36 MMHG (ref 35–45)
PCO2 ARTERIAL: 36.4 MMHG (ref 35–45)
PCO2 ARTERIAL: 37.6 MMHG (ref 35–45)
PCO2 ARTERIAL: 38.5 MMHG (ref 35–45)
PCO2 ARTERIAL: 39.2 MMHG (ref 35–45)
PCO2 ARTERIAL: 39.9 MMHG (ref 35–45)
PCO2 ARTERIAL: 40.7 MMHG (ref 35–45)
PCO2 ARTERIAL: 41 MMHG (ref 35–45)
PCO2, VEN: 29.2 MMHG (ref 40–50)
PDW BLD-RTO: 15.6 % (ref 12.4–15.4)
PDW BLD-RTO: 16.4 % (ref 12.4–15.4)
PDW BLD-RTO: 17.4 % (ref 12.4–15.4)
PDW BLD-RTO: 17.6 % (ref 12.4–15.4)
PDW BLD-RTO: 17.6 % (ref 12.4–15.4)
PDW BLD-RTO: 18 % (ref 12.4–15.4)
PDW BLD-RTO: 18 % (ref 12.4–15.4)
PDW BLD-RTO: 18.1 % (ref 12.4–15.4)
PDW BLD-RTO: 18.4 % (ref 12.4–15.4)
PDW BLD-RTO: 18.6 % (ref 12.4–15.4)
PDW BLD-RTO: 18.6 % (ref 12.4–15.4)
PDW BLD-RTO: 19.4 % (ref 12.4–15.4)
PDW BLD-RTO: 19.9 % (ref 12.4–15.4)
PERFORMED ON: ABNORMAL
PERFORMED ON: NORMAL
PH ARTERIAL: 7.2 (ref 7.35–7.45)
PH ARTERIAL: 7.26 (ref 7.35–7.45)
PH ARTERIAL: 7.32 (ref 7.35–7.45)
PH ARTERIAL: 7.33 (ref 7.35–7.45)
PH ARTERIAL: 7.34 (ref 7.35–7.45)
PH ARTERIAL: 7.36 (ref 7.35–7.45)
PH ARTERIAL: 7.38 (ref 7.35–7.45)
PH ARTERIAL: 7.38 (ref 7.35–7.45)
PH UA: 5.5
PH UA: 5.5 (ref 5–8)
PH VENOUS: 7.3 (ref 7.35–7.45)
PHENCYCLIDINE SCREEN URINE: NORMAL
PHOSPHORUS: 2.8 MG/DL (ref 2.5–4.9)
PHOSPHORUS: 3.4 MG/DL (ref 2.5–4.9)
PHOSPHORUS: 3.7 MG/DL (ref 2.5–4.9)
PHOSPHORUS: 4 MG/DL (ref 2.5–4.9)
PHOSPHORUS: 4.1 MG/DL (ref 2.5–4.9)
PHOSPHORUS: 4.4 MG/DL (ref 2.5–4.9)
PHOSPHORUS: 4.4 MG/DL (ref 2.5–4.9)
PHOSPHORUS: 4.6 MG/DL (ref 2.5–4.9)
PHOSPHORUS: 4.7 MG/DL (ref 2.5–4.9)
PHOSPHORUS: 4.8 MG/DL (ref 2.5–4.9)
PHOSPHORUS: 5.1 MG/DL (ref 2.5–4.9)
PHOSPHORUS: 7 MG/DL (ref 2.5–4.9)
PLATELET # BLD: 100 K/UL (ref 135–450)
PLATELET # BLD: 191 K/UL (ref 135–450)
PLATELET # BLD: 37 K/UL (ref 135–450)
PLATELET # BLD: 38 K/UL (ref 135–450)
PLATELET # BLD: 38 K/UL (ref 135–450)
PLATELET # BLD: 39 K/UL (ref 135–450)
PLATELET # BLD: 50 K/UL (ref 135–450)
PLATELET # BLD: 53 K/UL (ref 135–450)
PLATELET # BLD: 55 K/UL (ref 135–450)
PLATELET # BLD: 62 K/UL (ref 135–450)
PLATELET # BLD: 69 K/UL (ref 135–450)
PLATELET # BLD: 80 K/UL (ref 135–450)
PLATELET # BLD: 86 K/UL (ref 135–450)
PLATELET SLIDE REVIEW: ABNORMAL
PLATELET SLIDE REVIEW: ADEQUATE
PMV BLD AUTO: 10.1 FL (ref 5–10.5)
PMV BLD AUTO: 10.3 FL (ref 5–10.5)
PMV BLD AUTO: 10.9 FL (ref 5–10.5)
PMV BLD AUTO: 9 FL (ref 5–10.5)
PMV BLD AUTO: 9 FL (ref 5–10.5)
PMV BLD AUTO: 9.1 FL (ref 5–10.5)
PMV BLD AUTO: 9.1 FL (ref 5–10.5)
PMV BLD AUTO: 9.2 FL (ref 5–10.5)
PMV BLD AUTO: 9.2 FL (ref 5–10.5)
PMV BLD AUTO: 9.6 FL (ref 5–10.5)
PMV BLD AUTO: 9.7 FL (ref 5–10.5)
PMV BLD AUTO: 9.8 FL (ref 5–10.5)
PMV BLD AUTO: 9.9 FL (ref 5–10.5)
PO2 ARTERIAL: 112 MMHG (ref 75–108)
PO2 ARTERIAL: 113 MMHG (ref 75–108)
PO2 ARTERIAL: 115 MMHG (ref 75–108)
PO2 ARTERIAL: 119 MMHG (ref 75–108)
PO2 ARTERIAL: 123 MMHG (ref 75–108)
PO2 ARTERIAL: 137 MMHG (ref 75–108)
PO2 ARTERIAL: 150 MMHG (ref 75–108)
PO2 ARTERIAL: 153 MMHG (ref 75–108)
PO2 ARTERIAL: 159 MMHG (ref 75–108)
PO2 ARTERIAL: 171 MMHG (ref 75–108)
PO2 ARTERIAL: 71.4 MMHG (ref 75–108)
PO2 ARTERIAL: 96 MMHG (ref 75–108)
PO2, VEN: 94 MMHG (ref 25–40)
POC SAMPLE TYPE: ABNORMAL
POIKILOCYTES: ABNORMAL
POLYCHROMASIA: ABNORMAL
POTASSIUM REFLEX MAGNESIUM: 2 MMOL/L (ref 3.5–5.1)
POTASSIUM REFLEX MAGNESIUM: 2.6 MMOL/L (ref 3.5–5.1)
POTASSIUM REFLEX MAGNESIUM: 2.7 MMOL/L (ref 3.5–5.1)
POTASSIUM REFLEX MAGNESIUM: 2.9 MMOL/L (ref 3.5–5.1)
POTASSIUM REFLEX MAGNESIUM: 3 MMOL/L (ref 3.5–5.1)
POTASSIUM REFLEX MAGNESIUM: 3.3 MMOL/L (ref 3.5–5.1)
POTASSIUM REFLEX MAGNESIUM: 3.5 MMOL/L (ref 3.5–5.1)
POTASSIUM REFLEX MAGNESIUM: 3.7 MMOL/L (ref 3.5–5.1)
POTASSIUM REFLEX MAGNESIUM: 3.8 MMOL/L (ref 3.5–5.1)
POTASSIUM REFLEX MAGNESIUM: 4.5 MMOL/L (ref 3.5–5.1)
POTASSIUM REFLEX MAGNESIUM: 4.5 MMOL/L (ref 3.5–5.1)
POTASSIUM SERPL-SCNC: 2.5 MMOL/L (ref 3.5–5.1)
POTASSIUM SERPL-SCNC: 2.8 MMOL/L (ref 3.5–5.1)
POTASSIUM SERPL-SCNC: 2.8 MMOL/L (ref 3.5–5.1)
POTASSIUM SERPL-SCNC: 3.1 MMOL/L (ref 3.5–5.1)
POTASSIUM SERPL-SCNC: 3.1 MMOL/L (ref 3.5–5.1)
POTASSIUM SERPL-SCNC: 3.3 MMOL/L (ref 3.5–5.1)
POTASSIUM SERPL-SCNC: 3.3 MMOL/L (ref 3.5–5.1)
POTASSIUM SERPL-SCNC: 3.7 MMOL/L (ref 3.5–5.1)
PROCALCITONIN: 5.03 NG/ML (ref 0–0.15)
PROPOXYPHENE SCREEN: NORMAL
PROTEIN FLUID: 0.9 G/DL
PROTEIN UA: 100 MG/DL
PROTHROMBIN TIME: 26.7 SEC (ref 9.9–12.7)
PROTHROMBIN TIME: 30.6 SEC (ref 9.9–12.7)
PROTHROMBIN TIME: 31.6 SEC (ref 9.9–12.7)
PROTHROMBIN TIME: 32.5 SEC (ref 9.9–12.7)
PROTHROMBIN TIME: 33.1 SEC (ref 9.9–12.7)
PROTHROMBIN TIME: 33.7 SEC (ref 9.9–12.7)
PROTHROMBIN TIME: 34 SEC (ref 9.9–12.7)
PROTHROMBIN TIME: 34.8 SEC (ref 9.9–12.7)
PROTHROMBIN TIME: 35.6 SEC (ref 9.9–12.7)
PROTHROMBIN TIME: 35.9 SEC (ref 9.9–12.7)
PROTHROMBIN TIME: 36 SEC (ref 9.9–12.7)
PROTHROMBIN TIME: 49.4 SEC (ref 9.9–12.7)
PROTHROMBIN TIME: 49.9 SEC (ref 9.9–12.7)
RBC # BLD: 1.78 M/UL (ref 4.2–5.9)
RBC # BLD: 2.08 M/UL (ref 4.2–5.9)
RBC # BLD: 2.26 M/UL (ref 4.2–5.9)
RBC # BLD: 2.26 M/UL (ref 4.2–5.9)
RBC # BLD: 2.28 M/UL (ref 4.2–5.9)
RBC # BLD: 2.32 M/UL (ref 4.2–5.9)
RBC # BLD: 2.4 M/UL (ref 4.2–5.9)
RBC # BLD: 2.43 M/UL (ref 4.2–5.9)
RBC # BLD: 2.44 M/UL (ref 4.2–5.9)
RBC # BLD: 2.48 M/UL (ref 4.2–5.9)
RBC # BLD: 2.63 M/UL (ref 4.2–5.9)
RBC # BLD: 2.7 M/UL (ref 4.2–5.9)
RBC # BLD: 3.25 M/UL (ref 4.2–5.9)
RBC FLUID: <1000 /CUMM
RBC UA: ABNORMAL /HPF (ref 0–4)
RENAL EPITHELIAL, UA: ABNORMAL /HPF (ref 0–1)
REPORT: NORMAL
SALICYLATE, SERUM: <0.3 MG/DL (ref 15–30)
SARS-COV-2: NOT DETECTED
SLIDE REVIEW: ABNORMAL
SODIUM BLD-SCNC: 119 MMOL/L (ref 136–145)
SODIUM BLD-SCNC: 122 MMOL/L (ref 136–145)
SODIUM BLD-SCNC: 128 MMOL/L (ref 136–145)
SODIUM BLD-SCNC: 128 MMOL/L (ref 136–145)
SODIUM BLD-SCNC: 130 MMOL/L (ref 136–145)
SODIUM BLD-SCNC: 131 MMOL/L (ref 136–145)
SODIUM BLD-SCNC: 132 MMOL/L (ref 136–145)
SODIUM BLD-SCNC: 133 MMOL/L (ref 136–145)
SODIUM BLD-SCNC: 134 MMOL/L (ref 136–145)
SODIUM BLD-SCNC: 135 MMOL/L (ref 136–145)
SODIUM BLD-SCNC: 136 MMOL/L (ref 136–145)
SODIUM BLD-SCNC: 138 MMOL/L (ref 136–145)
SODIUM BLD-SCNC: 140 MMOL/L (ref 136–145)
SODIUM BLD-SCNC: 142 MMOL/L (ref 136–145)
SODIUM BLD-SCNC: 144 MMOL/L (ref 136–145)
SODIUM BLD-SCNC: 147 MMOL/L (ref 136–145)
SODIUM BLD-SCNC: 149 MMOL/L (ref 136–145)
SODIUM BLD-SCNC: 151 MMOL/L (ref 136–145)
SODIUM BLD-SCNC: 152 MMOL/L (ref 136–145)
SODIUM BLD-SCNC: 154 MMOL/L (ref 136–145)
SODIUM URINE: <20 MMOL/L
SPECIFIC GRAVITY UA: 1.02 (ref 1–1.03)
TARGET CELLS: ABNORMAL
TCO2 ARTERIAL: 38.9 MMOL/L
TCO2 ARTERIAL: 41.3 MMOL/L
TCO2 ARTERIAL: 42.2 MMOL/L
TCO2 ARTERIAL: 44.4 MMOL/L
TCO2 ARTERIAL: 45.9 MMOL/L
TCO2 ARTERIAL: 47.1 MMOL/L
TCO2 ARTERIAL: 48.6 MMOL/L
TCO2 ARTERIAL: 48.8 MMOL/L
TCO2 ARTERIAL: 50.4 MMOL/L
TCO2 ARTERIAL: 50.5 MMOL/L
TCO2 ARTERIAL: 50.8 MMOL/L
TCO2 ARTERIAL: 50.9 MMOL/L
TCO2 CALC VENOUS: 34 MMOL/L
TOTAL CK: 115 U/L (ref 39–308)
TOTAL CK: 139 U/L (ref 39–308)
TOTAL CK: 181 U/L (ref 39–308)
TOTAL CK: 28 U/L (ref 39–308)
TOTAL CK: 28 U/L (ref 39–308)
TOTAL CK: 292 U/L (ref 39–308)
TOTAL CK: 38 U/L (ref 39–308)
TOTAL CK: 45 U/L (ref 39–308)
TOTAL CK: 51 U/L (ref 39–308)
TOTAL CK: 64 U/L (ref 39–308)
TOTAL CK: 89 U/L (ref 39–308)
TOTAL CK: 94 U/L (ref 39–308)
TOTAL PROTEIN: 4 G/DL (ref 6.4–8.2)
TOTAL PROTEIN: 4.1 G/DL (ref 6.4–8.2)
TOTAL PROTEIN: 4.2 G/DL (ref 6.4–8.2)
TOTAL PROTEIN: 4.3 G/DL (ref 6.4–8.2)
TOTAL PROTEIN: 4.4 G/DL (ref 6.4–8.2)
TOTAL PROTEIN: 4.5 G/DL (ref 6.4–8.2)
TOTAL PROTEIN: 4.6 G/DL (ref 6.4–8.2)
TOTAL PROTEIN: 4.6 G/DL (ref 6.4–8.2)
TOTAL PROTEIN: 4.7 G/DL (ref 6.4–8.2)
TOTAL PROTEIN: 4.7 G/DL (ref 6.4–8.2)
TOTAL PROTEIN: 4.8 G/DL (ref 6.4–8.2)
TOXIC GRANULATION: PRESENT
TRIGL SERPL-MCNC: 140 MG/DL (ref 0–150)
TROPONIN: 0.08 NG/ML
TROPONIN: 0.09 NG/ML
TROPONIN: 0.1 NG/ML
URINE REFLEX TO CULTURE: ABNORMAL
URINE TYPE: ABNORMAL
UROBILINOGEN, URINE: 0.2 E.U./DL
WBC # BLD: 11.4 K/UL (ref 4–11)
WBC # BLD: 14.1 K/UL (ref 4–11)
WBC # BLD: 14.2 K/UL (ref 4–11)
WBC # BLD: 18.8 K/UL (ref 4–11)
WBC # BLD: 19.7 K/UL (ref 4–11)
WBC # BLD: 19.8 K/UL (ref 4–11)
WBC # BLD: 20 K/UL (ref 4–11)
WBC # BLD: 23 K/UL (ref 4–11)
WBC # BLD: 23.7 K/UL (ref 4–11)
WBC # BLD: 25.4 K/UL (ref 4–11)
WBC # BLD: 27.8 K/UL (ref 4–11)
WBC # BLD: 32.8 K/UL (ref 4–11)
WBC # BLD: 42.9 K/UL (ref 4–11)
WBC UA: ABNORMAL /HPF (ref 0–5)

## 2021-01-01 PROCEDURE — 6370000000 HC RX 637 (ALT 250 FOR IP): Performed by: INTERNAL MEDICINE

## 2021-01-01 PROCEDURE — 94003 VENT MGMT INPAT SUBQ DAY: CPT

## 2021-01-01 PROCEDURE — 94640 AIRWAY INHALATION TREATMENT: CPT

## 2021-01-01 PROCEDURE — 2700000000 HC OXYGEN THERAPY PER DAY

## 2021-01-01 PROCEDURE — 6360000002 HC RX W HCPCS: Performed by: INTERNAL MEDICINE

## 2021-01-01 PROCEDURE — 84478 ASSAY OF TRIGLYCERIDES: CPT

## 2021-01-01 PROCEDURE — P9047 ALBUMIN (HUMAN), 25%, 50ML: HCPCS | Performed by: INTERNAL MEDICINE

## 2021-01-01 PROCEDURE — 82550 ASSAY OF CK (CPK): CPT

## 2021-01-01 PROCEDURE — 86698 HISTOPLASMA ANTIBODY: CPT

## 2021-01-01 PROCEDURE — 0DJ08ZZ INSPECTION OF UPPER INTESTINAL TRACT, VIA NATURAL OR ARTIFICIAL OPENING ENDOSCOPIC: ICD-10-PCS | Performed by: INTERNAL MEDICINE

## 2021-01-01 PROCEDURE — 2580000003 HC RX 258: Performed by: INTERNAL MEDICINE

## 2021-01-01 PROCEDURE — C9113 INJ PANTOPRAZOLE SODIUM, VIA: HCPCS | Performed by: INTERNAL MEDICINE

## 2021-01-01 PROCEDURE — 6370000000 HC RX 637 (ALT 250 FOR IP): Performed by: PHYSICIAN ASSISTANT

## 2021-01-01 PROCEDURE — 80053 COMPREHEN METABOLIC PANEL: CPT

## 2021-01-01 PROCEDURE — 82140 ASSAY OF AMMONIA: CPT

## 2021-01-01 PROCEDURE — APPNB30 APP NON BILLABLE TIME 0-30 MINS: Performed by: NURSE PRACTITIONER

## 2021-01-01 PROCEDURE — 86635 COCCIDIOIDES ANTIBODY: CPT

## 2021-01-01 PROCEDURE — 85018 HEMOGLOBIN: CPT

## 2021-01-01 PROCEDURE — 83735 ASSAY OF MAGNESIUM: CPT

## 2021-01-01 PROCEDURE — 2500000003 HC RX 250 WO HCPCS: Performed by: INTERNAL MEDICINE

## 2021-01-01 PROCEDURE — 89051 BODY FLUID CELL COUNT: CPT

## 2021-01-01 PROCEDURE — 82803 BLOOD GASES ANY COMBINATION: CPT

## 2021-01-01 PROCEDURE — 86701 HIV-1ANTIBODY: CPT

## 2021-01-01 PROCEDURE — 36592 COLLECT BLOOD FROM PICC: CPT

## 2021-01-01 PROCEDURE — 94761 N-INVAS EAR/PLS OXIMETRY MLT: CPT

## 2021-01-01 PROCEDURE — 84132 ASSAY OF SERUM POTASSIUM: CPT

## 2021-01-01 PROCEDURE — 99233 SBSQ HOSP IP/OBS HIGH 50: CPT | Performed by: INTERNAL MEDICINE

## 2021-01-01 PROCEDURE — 82042 OTHER SOURCE ALBUMIN QUAN EA: CPT

## 2021-01-01 PROCEDURE — 71045 X-RAY EXAM CHEST 1 VIEW: CPT

## 2021-01-01 PROCEDURE — 99291 CRITICAL CARE FIRST HOUR: CPT | Performed by: INTERNAL MEDICINE

## 2021-01-01 PROCEDURE — 85025 COMPLETE CBC W/AUTO DIFF WBC: CPT

## 2021-01-01 PROCEDURE — 84157 ASSAY OF PROTEIN OTHER: CPT

## 2021-01-01 PROCEDURE — 83605 ASSAY OF LACTIC ACID: CPT

## 2021-01-01 PROCEDURE — 84295 ASSAY OF SERUM SODIUM: CPT

## 2021-01-01 PROCEDURE — P9017 PLASMA 1 DONOR FRZ W/IN 8 HR: HCPCS

## 2021-01-01 PROCEDURE — 74019 RADEX ABDOMEN 2 VIEWS: CPT

## 2021-01-01 PROCEDURE — 84484 ASSAY OF TROPONIN QUANT: CPT

## 2021-01-01 PROCEDURE — 0DJD8ZZ INSPECTION OF LOWER INTESTINAL TRACT, VIA NATURAL OR ARTIFICIAL OPENING ENDOSCOPIC: ICD-10-PCS | Performed by: INTERNAL MEDICINE

## 2021-01-01 PROCEDURE — 2500000003 HC RX 250 WO HCPCS

## 2021-01-01 PROCEDURE — 2000000000 HC ICU R&B

## 2021-01-01 PROCEDURE — 87150 DNA/RNA AMPLIFIED PROBE: CPT

## 2021-01-01 PROCEDURE — 86606 ASPERGILLUS ANTIBODY: CPT

## 2021-01-01 PROCEDURE — 84100 ASSAY OF PHOSPHORUS: CPT

## 2021-01-01 PROCEDURE — 96375 TX/PRO/DX INJ NEW DRUG ADDON: CPT

## 2021-01-01 PROCEDURE — 83690 ASSAY OF LIPASE: CPT

## 2021-01-01 PROCEDURE — 5A1955Z RESPIRATORY VENTILATION, GREATER THAN 96 CONSECUTIVE HOURS: ICD-10-PCS | Performed by: EMERGENCY MEDICINE

## 2021-01-01 PROCEDURE — 83930 ASSAY OF BLOOD OSMOLALITY: CPT

## 2021-01-01 PROCEDURE — 93010 ELECTROCARDIOGRAM REPORT: CPT | Performed by: INTERNAL MEDICINE

## 2021-01-01 PROCEDURE — 86923 COMPATIBILITY TEST ELECTRIC: CPT

## 2021-01-01 PROCEDURE — 80179 DRUG ASSAY SALICYLATE: CPT

## 2021-01-01 PROCEDURE — 31500 INSERT EMERGENCY AIRWAY: CPT

## 2021-01-01 PROCEDURE — 85014 HEMATOCRIT: CPT

## 2021-01-01 PROCEDURE — 87305 ASPERGILLUS AG IA: CPT

## 2021-01-01 PROCEDURE — 87390 HIV-1 AG IA: CPT

## 2021-01-01 PROCEDURE — 85610 PROTHROMBIN TIME: CPT

## 2021-01-01 PROCEDURE — C9113 INJ PANTOPRAZOLE SODIUM, VIA: HCPCS | Performed by: PHYSICIAN ASSISTANT

## 2021-01-01 PROCEDURE — 0BH18EZ INSERTION OF ENDOTRACHEAL AIRWAY INTO TRACHEA, VIA NATURAL OR ARTIFICIAL OPENING ENDOSCOPIC: ICD-10-PCS | Performed by: EMERGENCY MEDICINE

## 2021-01-01 PROCEDURE — C9113 INJ PANTOPRAZOLE SODIUM, VIA: HCPCS | Performed by: EMERGENCY MEDICINE

## 2021-01-01 PROCEDURE — 76705 ECHO EXAM OF ABDOMEN: CPT

## 2021-01-01 PROCEDURE — 36415 COLL VENOUS BLD VENIPUNCTURE: CPT

## 2021-01-01 PROCEDURE — 6360000002 HC RX W HCPCS: Performed by: EMERGENCY MEDICINE

## 2021-01-01 PROCEDURE — 2580000003 HC RX 258: Performed by: PHYSICIAN ASSISTANT

## 2021-01-01 PROCEDURE — 0W9G3ZZ DRAINAGE OF PERITONEAL CAVITY, PERCUTANEOUS APPROACH: ICD-10-PCS | Performed by: RADIOLOGY

## 2021-01-01 PROCEDURE — 36430 TRANSFUSION BLD/BLD COMPNT: CPT

## 2021-01-01 PROCEDURE — 99232 SBSQ HOSP IP/OBS MODERATE 35: CPT | Performed by: SURGERY

## 2021-01-01 PROCEDURE — 86850 RBC ANTIBODY SCREEN: CPT

## 2021-01-01 PROCEDURE — 83935 ASSAY OF URINE OSMOLALITY: CPT

## 2021-01-01 PROCEDURE — 6360000002 HC RX W HCPCS: Performed by: PHYSICIAN ASSISTANT

## 2021-01-01 PROCEDURE — 87449 NOS EACH ORGANISM AG IA: CPT

## 2021-01-01 PROCEDURE — 86612 BLASTOMYCES ANTIBODY: CPT

## 2021-01-01 PROCEDURE — 87324 CLOSTRIDIUM AG IA: CPT

## 2021-01-01 PROCEDURE — 71046 X-RAY EXAM CHEST 2 VIEWS: CPT

## 2021-01-01 PROCEDURE — 71250 CT THORAX DX C-: CPT

## 2021-01-01 PROCEDURE — 70450 CT HEAD/BRAIN W/O DYE: CPT

## 2021-01-01 PROCEDURE — 74176 CT ABD & PELVIS W/O CONTRAST: CPT

## 2021-01-01 PROCEDURE — 99223 1ST HOSP IP/OBS HIGH 75: CPT | Performed by: INTERNAL MEDICINE

## 2021-01-01 PROCEDURE — 2580000003 HC RX 258: Performed by: EMERGENCY MEDICINE

## 2021-01-01 PROCEDURE — U0005 INFEC AGEN DETEC AMPLI PROBE: HCPCS

## 2021-01-01 PROCEDURE — 80074 ACUTE HEPATITIS PANEL: CPT

## 2021-01-01 PROCEDURE — 6370000000 HC RX 637 (ALT 250 FOR IP): Performed by: EMERGENCY MEDICINE

## 2021-01-01 PROCEDURE — 82947 ASSAY GLUCOSE BLOOD QUANT: CPT

## 2021-01-01 PROCEDURE — 84145 PROCALCITONIN (PCT): CPT

## 2021-01-01 PROCEDURE — 96365 THER/PROPH/DIAG IV INF INIT: CPT

## 2021-01-01 PROCEDURE — 82077 ASSAY SPEC XCP UR&BREATH IA: CPT

## 2021-01-01 PROCEDURE — C1729 CATH, DRAINAGE: HCPCS

## 2021-01-01 PROCEDURE — 84300 ASSAY OF URINE SODIUM: CPT

## 2021-01-01 PROCEDURE — 05HM33Z INSERTION OF INFUSION DEVICE INTO RIGHT INTERNAL JUGULAR VEIN, PERCUTANEOUS APPROACH: ICD-10-PCS | Performed by: INTERNAL MEDICINE

## 2021-01-01 PROCEDURE — 49083 ABD PARACENTESIS W/IMAGING: CPT

## 2021-01-01 PROCEDURE — 2500000003 HC RX 250 WO HCPCS: Performed by: EMERGENCY MEDICINE

## 2021-01-01 PROCEDURE — 80307 DRUG TEST PRSMV CHEM ANLYZR: CPT

## 2021-01-01 PROCEDURE — 89220 SPUTUM SPECIMEN COLLECTION: CPT

## 2021-01-01 PROCEDURE — 51702 INSERT TEMP BLADDER CATH: CPT

## 2021-01-01 PROCEDURE — 93005 ELECTROCARDIOGRAM TRACING: CPT | Performed by: PHYSICIAN ASSISTANT

## 2021-01-01 PROCEDURE — U0003 INFECTIOUS AGENT DETECTION BY NUCLEIC ACID (DNA OR RNA); SEVERE ACUTE RESPIRATORY SYNDROME CORONAVIRUS 2 (SARS-COV-2) (CORONAVIRUS DISEASE [COVID-19]), AMPLIFIED PROBE TECHNIQUE, MAKING USE OF HIGH THROUGHPUT TECHNOLOGIES AS DESCRIBED BY CMS-2020-01-R: HCPCS

## 2021-01-01 PROCEDURE — 3609017100 HC EGD: Performed by: INTERNAL MEDICINE

## 2021-01-01 PROCEDURE — APPSS15 APP SPLIT SHARED TIME 0-15 MINUTES: Performed by: NURSE PRACTITIONER

## 2021-01-01 PROCEDURE — 93005 ELECTROCARDIOGRAM TRACING: CPT | Performed by: INTERNAL MEDICINE

## 2021-01-01 PROCEDURE — 87070 CULTURE OTHR SPECIMN AEROBIC: CPT

## 2021-01-01 PROCEDURE — 87107 FUNGI IDENTIFICATION MOLD: CPT

## 2021-01-01 PROCEDURE — 99285 EMERGENCY DEPT VISIT HI MDM: CPT

## 2021-01-01 PROCEDURE — 74018 RADEX ABDOMEN 1 VIEW: CPT

## 2021-01-01 PROCEDURE — 87205 SMEAR GRAM STAIN: CPT

## 2021-01-01 PROCEDURE — 36556 INSERT NON-TUNNEL CV CATH: CPT

## 2021-01-01 PROCEDURE — 6370000000 HC RX 637 (ALT 250 FOR IP): Performed by: STUDENT IN AN ORGANIZED HEALTH CARE EDUCATION/TRAINING PROGRAM

## 2021-01-01 PROCEDURE — 81001 URINALYSIS AUTO W/SCOPE: CPT

## 2021-01-01 PROCEDURE — 87015 SPECIMEN INFECT AGNT CONCNTJ: CPT

## 2021-01-01 PROCEDURE — 96367 TX/PROPH/DG ADDL SEQ IV INF: CPT

## 2021-01-01 PROCEDURE — APPSS60 APP SPLIT SHARED TIME 46-60 MINUTES: Performed by: NURSE PRACTITIONER

## 2021-01-01 PROCEDURE — 86901 BLOOD TYPING SEROLOGIC RH(D): CPT

## 2021-01-01 PROCEDURE — 86702 HIV-2 ANTIBODY: CPT

## 2021-01-01 PROCEDURE — 87040 BLOOD CULTURE FOR BACTERIA: CPT

## 2021-01-01 PROCEDURE — 86900 BLOOD TYPING SEROLOGIC ABO: CPT

## 2021-01-01 PROCEDURE — P9016 RBC LEUKOCYTES REDUCED: HCPCS

## 2021-01-01 PROCEDURE — 94002 VENT MGMT INPAT INIT DAY: CPT

## 2021-01-01 PROCEDURE — P9047 ALBUMIN (HUMAN), 25%, 50ML: HCPCS | Performed by: EMERGENCY MEDICINE

## 2021-01-01 PROCEDURE — 99254 IP/OBS CNSLTJ NEW/EST MOD 60: CPT | Performed by: SURGERY

## 2021-01-01 PROCEDURE — 36600 WITHDRAWAL OF ARTERIAL BLOOD: CPT

## 2021-01-01 PROCEDURE — 87077 CULTURE AEROBIC IDENTIFY: CPT

## 2021-01-01 PROCEDURE — 2709999900 HC NON-CHARGEABLE SUPPLY: Performed by: INTERNAL MEDICINE

## 2021-01-01 PROCEDURE — 87186 SC STD MICRODIL/AGAR DIL: CPT

## 2021-01-01 PROCEDURE — 87181 SC STD AGAR DILUTION PER AGT: CPT

## 2021-01-01 PROCEDURE — 80143 DRUG ASSAY ACETAMINOPHEN: CPT

## 2021-01-01 PROCEDURE — 99233 SBSQ HOSP IP/OBS HIGH 50: CPT | Performed by: HOSPITALIST

## 2021-01-01 PROCEDURE — 3609027000 HC COLONOSCOPY: Performed by: INTERNAL MEDICINE

## 2021-01-01 PROCEDURE — 87641 MR-STAPH DNA AMP PROBE: CPT

## 2021-01-01 RX ORDER — LORAZEPAM 2 MG/ML
1 INJECTION INTRAMUSCULAR EVERY 6 HOURS PRN
Status: DISCONTINUED | OUTPATIENT
Start: 2021-01-01 | End: 2021-01-01 | Stop reason: SDUPTHER

## 2021-01-01 RX ORDER — FENTANYL CITRATE 50 UG/ML
50 INJECTION, SOLUTION INTRAMUSCULAR; INTRAVENOUS EVERY 5 MIN PRN
Status: DISCONTINUED | OUTPATIENT
Start: 2021-01-01 | End: 2021-01-01

## 2021-01-01 RX ORDER — SODIUM CHLORIDE 9 MG/ML
INJECTION, SOLUTION INTRAVENOUS CONTINUOUS
Status: CANCELLED | OUTPATIENT
Start: 2021-01-01

## 2021-01-01 RX ORDER — FENTANYL CITRATE 50 UG/ML
25 INJECTION, SOLUTION INTRAMUSCULAR; INTRAVENOUS EVERY 5 MIN PRN
Status: DISCONTINUED | OUTPATIENT
Start: 2021-01-01 | End: 2021-01-01

## 2021-01-01 RX ORDER — POTASSIUM CHLORIDE 7.45 MG/ML
10 INJECTION INTRAVENOUS PRN
Status: DISCONTINUED | OUTPATIENT
Start: 2021-01-01 | End: 2021-01-01 | Stop reason: ALTCHOICE

## 2021-01-01 RX ORDER — FENTANYL CITRATE 50 UG/ML
25 INJECTION, SOLUTION INTRAMUSCULAR; INTRAVENOUS ONCE
Status: COMPLETED | OUTPATIENT
Start: 2021-01-01 | End: 2021-01-01

## 2021-01-01 RX ORDER — ONDANSETRON 2 MG/ML
4 INJECTION INTRAMUSCULAR; INTRAVENOUS EVERY 6 HOURS PRN
Status: DISCONTINUED | OUTPATIENT
Start: 2021-01-01 | End: 2021-09-22 | Stop reason: HOSPADM

## 2021-01-01 RX ORDER — PANTOPRAZOLE SODIUM 40 MG/10ML
80 INJECTION, POWDER, LYOPHILIZED, FOR SOLUTION INTRAVENOUS ONCE
Status: COMPLETED | OUTPATIENT
Start: 2021-01-01 | End: 2021-01-01

## 2021-01-01 RX ORDER — SODIUM CHLORIDE, SODIUM LACTATE, POTASSIUM CHLORIDE, CALCIUM CHLORIDE 600; 310; 30; 20 MG/100ML; MG/100ML; MG/100ML; MG/100ML
INJECTION, SOLUTION INTRAVENOUS ONCE
Status: COMPLETED | OUTPATIENT
Start: 2021-01-01 | End: 2021-01-01

## 2021-01-01 RX ORDER — SODIUM CHLORIDE 0.9 % (FLUSH) 0.9 %
5-40 SYRINGE (ML) INJECTION EVERY 12 HOURS SCHEDULED
Status: DISCONTINUED | OUTPATIENT
Start: 2021-01-01 | End: 2021-01-01 | Stop reason: SDUPTHER

## 2021-01-01 RX ORDER — HYDROMORPHONE HCL 110MG/55ML
0.25 PATIENT CONTROLLED ANALGESIA SYRINGE INTRAVENOUS EVERY 5 MIN PRN
Status: DISCONTINUED | OUTPATIENT
Start: 2021-01-01 | End: 2021-01-01

## 2021-01-01 RX ORDER — SODIUM CHLORIDE 9 MG/ML
INJECTION, SOLUTION INTRAVENOUS PRN
Status: DISCONTINUED | OUTPATIENT
Start: 2021-01-01 | End: 2021-09-22 | Stop reason: HOSPADM

## 2021-01-01 RX ORDER — MIDAZOLAM HYDROCHLORIDE 5 MG/ML
INJECTION INTRAMUSCULAR; INTRAVENOUS
Status: DISPENSED
Start: 2021-01-01 | End: 2021-01-01

## 2021-01-01 RX ORDER — SODIUM CHLORIDE, SODIUM LACTATE, POTASSIUM CHLORIDE, CALCIUM CHLORIDE 600; 310; 30; 20 MG/100ML; MG/100ML; MG/100ML; MG/100ML
1000 INJECTION, SOLUTION INTRAVENOUS ONCE
Status: COMPLETED | OUTPATIENT
Start: 2021-01-01 | End: 2021-01-01

## 2021-01-01 RX ORDER — SODIUM CHLORIDE 0.9 % (FLUSH) 0.9 %
5-40 SYRINGE (ML) INJECTION EVERY 12 HOURS SCHEDULED
Status: DISCONTINUED | OUTPATIENT
Start: 2021-01-01 | End: 2021-09-22 | Stop reason: HOSPADM

## 2021-01-01 RX ORDER — ALBUMIN (HUMAN) 12.5 G/50ML
25 SOLUTION INTRAVENOUS EVERY 6 HOURS
Status: COMPLETED | OUTPATIENT
Start: 2021-01-01 | End: 2021-01-01

## 2021-01-01 RX ORDER — LACTOBACILLUS RHAMNOSUS GG 10B CELL
1 CAPSULE ORAL 2 TIMES DAILY WITH MEALS
Status: DISCONTINUED | OUTPATIENT
Start: 2021-01-01 | End: 2021-09-22 | Stop reason: HOSPADM

## 2021-01-01 RX ORDER — DEXTROSE MONOHYDRATE 25 G/50ML
INJECTION, SOLUTION INTRAVENOUS
Status: COMPLETED
Start: 2021-01-01 | End: 2021-01-01

## 2021-01-01 RX ORDER — PANTOPRAZOLE SODIUM 40 MG/10ML
40 INJECTION, POWDER, LYOPHILIZED, FOR SOLUTION INTRAVENOUS 2 TIMES DAILY
Status: DISCONTINUED | OUTPATIENT
Start: 2021-01-01 | End: 2021-09-22 | Stop reason: HOSPADM

## 2021-01-01 RX ORDER — BISACODYL 10 MG
10 SUPPOSITORY, RECTAL RECTAL ONCE
Status: DISCONTINUED | OUTPATIENT
Start: 2021-01-01 | End: 2021-09-22 | Stop reason: HOSPADM

## 2021-01-01 RX ORDER — ALBUMIN (HUMAN) 12.5 G/50ML
25 SOLUTION INTRAVENOUS ONCE
Status: DISCONTINUED | OUTPATIENT
Start: 2021-01-01 | End: 2021-01-01 | Stop reason: SDUPTHER

## 2021-01-01 RX ORDER — LIDOCAINE HYDROCHLORIDE 10 MG/ML
1 INJECTION, SOLUTION EPIDURAL; INFILTRATION; INTRACAUDAL; PERINEURAL
Status: CANCELLED | OUTPATIENT
Start: 2021-01-01 | End: 2021-01-01

## 2021-01-01 RX ORDER — SODIUM CHLORIDE 9 MG/ML
INJECTION, SOLUTION INTRAVENOUS PRN
Status: COMPLETED | OUTPATIENT
Start: 2021-01-01 | End: 2021-01-01

## 2021-01-01 RX ORDER — MIDAZOLAM HYDROCHLORIDE 2 MG/2ML
5 INJECTION, SOLUTION INTRAMUSCULAR; INTRAVENOUS ONCE
Status: COMPLETED | OUTPATIENT
Start: 2021-01-01 | End: 2021-01-01

## 2021-01-01 RX ORDER — DEXMEDETOMIDINE HYDROCHLORIDE 4 UG/ML
.2-1.4 INJECTION, SOLUTION INTRAVENOUS CONTINUOUS
Status: DISCONTINUED | OUTPATIENT
Start: 2021-01-01 | End: 2021-09-22 | Stop reason: HOSPADM

## 2021-01-01 RX ORDER — MORPHINE SULFATE 10 MG/ML
5 INJECTION, SOLUTION INTRAMUSCULAR; INTRAVENOUS
Status: DISCONTINUED | OUTPATIENT
Start: 2021-01-01 | End: 2021-01-01 | Stop reason: SDUPTHER

## 2021-01-01 RX ORDER — DEXTROSE MONOHYDRATE 25 G/50ML
25 INJECTION, SOLUTION INTRAVENOUS PRN
Status: DISCONTINUED | OUTPATIENT
Start: 2021-01-01 | End: 2021-09-22 | Stop reason: HOSPADM

## 2021-01-01 RX ORDER — LORAZEPAM 2 MG/ML
4 INJECTION INTRAMUSCULAR
Status: DISCONTINUED | OUTPATIENT
Start: 2021-01-01 | End: 2021-09-22 | Stop reason: HOSPADM

## 2021-01-01 RX ORDER — ROCURONIUM BROMIDE 10 MG/ML
100 INJECTION, SOLUTION INTRAVENOUS ONCE
Status: DISCONTINUED | OUTPATIENT
Start: 2021-01-01 | End: 2021-01-01

## 2021-01-01 RX ORDER — SODIUM CHLORIDE 0.9 % (FLUSH) 0.9 %
5-40 SYRINGE (ML) INJECTION PRN
Status: DISCONTINUED | OUTPATIENT
Start: 2021-01-01 | End: 2021-09-22 | Stop reason: HOSPADM

## 2021-01-01 RX ORDER — SODIUM CHLORIDE 9 MG/ML
INJECTION, SOLUTION INTRAVENOUS PRN
Status: DISCONTINUED | OUTPATIENT
Start: 2021-01-01 | End: 2021-01-01 | Stop reason: SDUPTHER

## 2021-01-01 RX ORDER — PROPOFOL 10 MG/ML
5-50 INJECTION, EMULSION INTRAVENOUS
Status: DISCONTINUED | OUTPATIENT
Start: 2021-01-01 | End: 2021-09-22 | Stop reason: HOSPADM

## 2021-01-01 RX ORDER — POTASSIUM CHLORIDE 29.8 MG/ML
20 INJECTION INTRAVENOUS PRN
Status: DISCONTINUED | OUTPATIENT
Start: 2021-01-01 | End: 2021-09-22 | Stop reason: HOSPADM

## 2021-01-01 RX ORDER — POLYETHYLENE GLYCOL 3350 17 G/17G
17 POWDER, FOR SOLUTION ORAL DAILY PRN
Status: DISCONTINUED | OUTPATIENT
Start: 2021-01-01 | End: 2021-01-01

## 2021-01-01 RX ORDER — POTASSIUM CHLORIDE 7.45 MG/ML
10 INJECTION INTRAVENOUS ONCE
Status: COMPLETED | OUTPATIENT
Start: 2021-01-01 | End: 2021-01-01

## 2021-01-01 RX ORDER — PANTOPRAZOLE SODIUM 40 MG/10ML
40 INJECTION, POWDER, LYOPHILIZED, FOR SOLUTION INTRAVENOUS 2 TIMES DAILY
Status: DISCONTINUED | OUTPATIENT
Start: 2021-01-01 | End: 2021-01-01

## 2021-01-01 RX ORDER — MORPHINE SULFATE 10 MG/ML
10 INJECTION, SOLUTION INTRAMUSCULAR; INTRAVENOUS
Status: DISCONTINUED | OUTPATIENT
Start: 2021-01-01 | End: 2021-01-01 | Stop reason: SDUPTHER

## 2021-01-01 RX ORDER — IPRATROPIUM BROMIDE AND ALBUTEROL SULFATE 2.5; .5 MG/3ML; MG/3ML
1 SOLUTION RESPIRATORY (INHALATION) 4 TIMES DAILY
Status: DISCONTINUED | OUTPATIENT
Start: 2021-01-01 | End: 2021-09-22 | Stop reason: HOSPADM

## 2021-01-01 RX ORDER — HYDROCODONE BITARTRATE AND ACETAMINOPHEN 5; 325 MG/1; MG/1
1 TABLET ORAL
Status: ACTIVE | OUTPATIENT
Start: 2021-01-01 | End: 2021-01-01

## 2021-01-01 RX ORDER — MORPHINE SULFATE 4 MG/ML
4 INJECTION, SOLUTION INTRAMUSCULAR; INTRAVENOUS
Status: DISCONTINUED | OUTPATIENT
Start: 2021-01-01 | End: 2021-09-22 | Stop reason: HOSPADM

## 2021-01-01 RX ORDER — PHYTONADIONE 5 MG/1
5 TABLET ORAL ONCE
Status: DISCONTINUED | OUTPATIENT
Start: 2021-01-01 | End: 2021-01-01

## 2021-01-01 RX ORDER — ETOMIDATE 2 MG/ML
20 INJECTION INTRAVENOUS ONCE
Status: COMPLETED | OUTPATIENT
Start: 2021-01-01 | End: 2021-01-01

## 2021-01-01 RX ORDER — GELATIN CAPSULES (EMPTY)
1 CAPSULE ORAL
Status: DISCONTINUED | OUTPATIENT
Start: 2021-01-01 | End: 2021-09-22 | Stop reason: HOSPADM

## 2021-01-01 RX ORDER — LACTULOSE 10 G/15ML
20 SOLUTION ORAL
Status: DISCONTINUED | OUTPATIENT
Start: 2021-01-01 | End: 2021-01-01

## 2021-01-01 RX ORDER — SODIUM CHLORIDE, SODIUM LACTATE, POTASSIUM CHLORIDE, CALCIUM CHLORIDE 600; 310; 30; 20 MG/100ML; MG/100ML; MG/100ML; MG/100ML
INJECTION, SOLUTION INTRAVENOUS CONTINUOUS
Status: DISCONTINUED | OUTPATIENT
Start: 2021-01-01 | End: 2021-01-01

## 2021-01-01 RX ORDER — ACETAMINOPHEN 650 MG/1
650 SUPPOSITORY RECTAL EVERY 6 HOURS PRN
Status: DISCONTINUED | OUTPATIENT
Start: 2021-01-01 | End: 2021-09-22 | Stop reason: HOSPADM

## 2021-01-01 RX ORDER — PHYTONADIONE 5 MG/1
5 TABLET ORAL ONCE
Status: COMPLETED | OUTPATIENT
Start: 2021-01-01 | End: 2021-01-01

## 2021-01-01 RX ORDER — LACTOBACILLUS RHAMNOSUS GG 10B CELL
1 CAPSULE ORAL 2 TIMES DAILY WITH MEALS
Status: DISCONTINUED | OUTPATIENT
Start: 2021-01-01 | End: 2021-01-01

## 2021-01-01 RX ORDER — ONDANSETRON 2 MG/ML
4 INJECTION INTRAMUSCULAR; INTRAVENOUS
Status: ACTIVE | OUTPATIENT
Start: 2021-01-01 | End: 2021-01-01

## 2021-01-01 RX ORDER — POTASSIUM CHLORIDE 29.8 MG/ML
20 INJECTION INTRAVENOUS PRN
Status: DISCONTINUED | OUTPATIENT
Start: 2021-01-01 | End: 2021-01-01

## 2021-01-01 RX ORDER — ALBUMIN (HUMAN) 12.5 G/50ML
50 SOLUTION INTRAVENOUS ONCE
Status: COMPLETED | OUTPATIENT
Start: 2021-01-01 | End: 2021-01-01

## 2021-01-01 RX ORDER — LIDOCAINE HYDROCHLORIDE 10 MG/ML
10 INJECTION, SOLUTION EPIDURAL; INFILTRATION; INTRACAUDAL; PERINEURAL ONCE
Status: COMPLETED | OUTPATIENT
Start: 2021-01-01 | End: 2021-01-01

## 2021-01-01 RX ORDER — SENNA AND DOCUSATE SODIUM 50; 8.6 MG/1; MG/1
2 TABLET, FILM COATED ORAL 2 TIMES DAILY
Status: DISCONTINUED | OUTPATIENT
Start: 2021-01-01 | End: 2021-09-22 | Stop reason: HOSPADM

## 2021-01-01 RX ORDER — PROMETHAZINE HYDROCHLORIDE 25 MG/ML
6.25 INJECTION, SOLUTION INTRAMUSCULAR; INTRAVENOUS
Status: ACTIVE | OUTPATIENT
Start: 2021-01-01 | End: 2021-01-01

## 2021-01-01 RX ORDER — SODIUM CHLORIDE 9 MG/ML
25 INJECTION, SOLUTION INTRAVENOUS PRN
Status: DISCONTINUED | OUTPATIENT
Start: 2021-01-01 | End: 2021-09-22 | Stop reason: HOSPADM

## 2021-01-01 RX ORDER — MIDAZOLAM HYDROCHLORIDE 5 MG/ML
INJECTION INTRAMUSCULAR; INTRAVENOUS
Status: DISCONTINUED
Start: 2021-01-01 | End: 2021-01-01 | Stop reason: WASHOUT

## 2021-01-01 RX ORDER — LACTULOSE 10 G/15ML
40 SOLUTION ORAL ONCE
Status: COMPLETED | OUTPATIENT
Start: 2021-01-01 | End: 2021-01-01

## 2021-01-01 RX ORDER — SODIUM CHLORIDE 9 MG/ML
25 INJECTION, SOLUTION INTRAVENOUS PRN
Status: DISCONTINUED | OUTPATIENT
Start: 2021-01-01 | End: 2021-01-01 | Stop reason: SDUPTHER

## 2021-01-01 RX ORDER — ACETAMINOPHEN 325 MG/1
650 TABLET ORAL EVERY 6 HOURS PRN
Status: DISCONTINUED | OUTPATIENT
Start: 2021-01-01 | End: 2021-09-22 | Stop reason: HOSPADM

## 2021-01-01 RX ORDER — POLYETHYLENE GLYCOL 3350 17 G/17G
17 POWDER, FOR SOLUTION ORAL DAILY
Status: DISCONTINUED | OUTPATIENT
Start: 2021-01-01 | End: 2021-09-22 | Stop reason: HOSPADM

## 2021-01-01 RX ORDER — LEVOFLOXACIN 5 MG/ML
750 INJECTION, SOLUTION INTRAVENOUS
Status: DISCONTINUED | OUTPATIENT
Start: 2021-01-01 | End: 2021-09-22 | Stop reason: HOSPADM

## 2021-01-01 RX ORDER — LORAZEPAM 2 MG/ML
2 INJECTION INTRAMUSCULAR ONCE
Status: COMPLETED | OUTPATIENT
Start: 2021-01-01 | End: 2021-01-01

## 2021-01-01 RX ORDER — SODIUM CHLORIDE 9 MG/ML
INJECTION, SOLUTION INTRAVENOUS CONTINUOUS
Status: DISCONTINUED | OUTPATIENT
Start: 2021-01-01 | End: 2021-01-01

## 2021-01-01 RX ORDER — LORAZEPAM 2 MG/ML
2 INJECTION INTRAMUSCULAR EVERY 6 HOURS PRN
Status: DISCONTINUED | OUTPATIENT
Start: 2021-01-01 | End: 2021-01-01 | Stop reason: SDUPTHER

## 2021-01-01 RX ORDER — LACTULOSE 10 G/15ML
20 SOLUTION ORAL EVERY 6 HOURS SCHEDULED
Status: DISCONTINUED | OUTPATIENT
Start: 2021-01-01 | End: 2021-01-01

## 2021-01-01 RX ORDER — 0.9 % SODIUM CHLORIDE 0.9 %
1000 INTRAVENOUS SOLUTION INTRAVENOUS ONCE
Status: COMPLETED | OUTPATIENT
Start: 2021-01-01 | End: 2021-01-01

## 2021-01-01 RX ORDER — MIDAZOLAM HYDROCHLORIDE 2 MG/2ML
2 INJECTION, SOLUTION INTRAMUSCULAR; INTRAVENOUS ONCE
Status: COMPLETED | OUTPATIENT
Start: 2021-01-01 | End: 2021-01-01

## 2021-01-01 RX ORDER — ROCURONIUM BROMIDE 10 MG/ML
100 INJECTION, SOLUTION INTRAVENOUS ONCE
Status: COMPLETED | OUTPATIENT
Start: 2021-01-01 | End: 2021-01-01

## 2021-01-01 RX ORDER — LACTULOSE 10 G/15ML
20 SOLUTION ORAL EVERY 8 HOURS
Status: DISCONTINUED | OUTPATIENT
Start: 2021-01-01 | End: 2021-01-01

## 2021-01-01 RX ORDER — SODIUM CHLORIDE 0.9 % (FLUSH) 0.9 %
5-40 SYRINGE (ML) INJECTION PRN
Status: DISCONTINUED | OUTPATIENT
Start: 2021-01-01 | End: 2021-01-01 | Stop reason: SDUPTHER

## 2021-01-01 RX ORDER — FUROSEMIDE 10 MG/ML
20 INJECTION INTRAMUSCULAR; INTRAVENOUS 2 TIMES DAILY
Status: DISCONTINUED | OUTPATIENT
Start: 2021-01-01 | End: 2021-09-22 | Stop reason: HOSPADM

## 2021-01-01 RX ORDER — ONDANSETRON 4 MG/1
4 TABLET, ORALLY DISINTEGRATING ORAL EVERY 8 HOURS PRN
Status: DISCONTINUED | OUTPATIENT
Start: 2021-01-01 | End: 2021-09-22 | Stop reason: HOSPADM

## 2021-01-01 RX ORDER — HYDROMORPHONE HCL 110MG/55ML
0.5 PATIENT CONTROLLED ANALGESIA SYRINGE INTRAVENOUS EVERY 5 MIN PRN
Status: DISCONTINUED | OUTPATIENT
Start: 2021-01-01 | End: 2021-01-01

## 2021-01-01 RX ADMIN — Medication 100 MCG/HR: at 21:50

## 2021-01-01 RX ADMIN — POTASSIUM CHLORIDE 20 MEQ: 400 INJECTION, SOLUTION INTRAVENOUS at 01:56

## 2021-01-01 RX ADMIN — Medication 125 MCG/HR: at 19:05

## 2021-01-01 RX ADMIN — IPRATROPIUM BROMIDE AND ALBUTEROL SULFATE 1 AMPULE: .5; 3 SOLUTION RESPIRATORY (INHALATION) at 15:46

## 2021-01-01 RX ADMIN — LACTULOSE 20 G: 20 SOLUTION ORAL at 18:20

## 2021-01-01 RX ADMIN — LACTULOSE 20 G: 20 SOLUTION ORAL at 16:57

## 2021-01-01 RX ADMIN — SENNOSIDES AND DOCUSATE SODIUM 2 TABLET: 50; 8.6 TABLET ORAL at 09:06

## 2021-01-01 RX ADMIN — MORPHINE SULFATE 4 MG: 4 INJECTION INTRAVENOUS at 16:17

## 2021-01-01 RX ADMIN — PIPERACILLIN AND TAZOBACTAM 3375 MG: 3; .375 INJECTION, POWDER, LYOPHILIZED, FOR SOLUTION INTRAVENOUS at 16:34

## 2021-01-01 RX ADMIN — PROPOFOL 10 MCG/KG/MIN: 10 INJECTION, EMULSION INTRAVENOUS at 20:46

## 2021-01-01 RX ADMIN — IPRATROPIUM BROMIDE AND ALBUTEROL SULFATE 1 AMPULE: .5; 3 SOLUTION RESPIRATORY (INHALATION) at 16:17

## 2021-01-01 RX ADMIN — SODIUM CHLORIDE 8 MG/HR: 9 INJECTION, SOLUTION INTRAVENOUS at 09:56

## 2021-01-01 RX ADMIN — LACTULOSE 20 G: 20 SOLUTION ORAL at 20:34

## 2021-01-01 RX ADMIN — FUROSEMIDE 20 MG: 10 INJECTION, SOLUTION INTRAMUSCULAR; INTRAVENOUS at 09:18

## 2021-01-01 RX ADMIN — IPRATROPIUM BROMIDE AND ALBUTEROL SULFATE 1 AMPULE: .5; 3 SOLUTION RESPIRATORY (INHALATION) at 07:37

## 2021-01-01 RX ADMIN — LACTULOSE 20 G: 20 SOLUTION ORAL at 20:01

## 2021-01-01 RX ADMIN — IPRATROPIUM BROMIDE AND ALBUTEROL SULFATE 1 AMPULE: .5; 3 SOLUTION RESPIRATORY (INHALATION) at 08:53

## 2021-01-01 RX ADMIN — SENNOSIDES AND DOCUSATE SODIUM 2 TABLET: 50; 8.6 TABLET ORAL at 20:34

## 2021-01-01 RX ADMIN — Medication 100 MCG/HR: at 04:12

## 2021-01-01 RX ADMIN — PROPOFOL 40 MCG/KG/MIN: 10 INJECTION, EMULSION INTRAVENOUS at 04:57

## 2021-01-01 RX ADMIN — VASOPRESSIN 0.03 UNITS/MIN: 20 INJECTION INTRAVENOUS at 14:04

## 2021-01-01 RX ADMIN — PANTOPRAZOLE SODIUM 40 MG: 40 INJECTION, POWDER, FOR SOLUTION INTRAVENOUS at 20:00

## 2021-01-01 RX ADMIN — Medication 200 MCG/HR: at 08:57

## 2021-01-01 RX ADMIN — Medication 50 MCG/HR: at 16:17

## 2021-01-01 RX ADMIN — LACTULOSE 20 G: 20 SOLUTION ORAL at 20:06

## 2021-01-01 RX ADMIN — Medication 10 ML: at 09:38

## 2021-01-01 RX ADMIN — FUROSEMIDE 20 MG: 10 INJECTION, SOLUTION INTRAMUSCULAR; INTRAVENOUS at 17:44

## 2021-01-01 RX ADMIN — Medication 10 ML: at 08:39

## 2021-01-01 RX ADMIN — PROPOFOL 15 MCG/KG/MIN: 10 INJECTION, EMULSION INTRAVENOUS at 13:00

## 2021-01-01 RX ADMIN — MIDAZOLAM 2 MG: 1 INJECTION INTRAMUSCULAR; INTRAVENOUS at 09:38

## 2021-01-01 RX ADMIN — Medication 200 MCG/HR: at 23:32

## 2021-01-01 RX ADMIN — DEXMEDETOMIDINE HYDROCHLORIDE 1 MCG/KG/HR: 4 INJECTION, SOLUTION INTRAVENOUS at 23:07

## 2021-01-01 RX ADMIN — SODIUM CHLORIDE 1000 ML: 9 INJECTION, SOLUTION INTRAVENOUS at 15:25

## 2021-01-01 RX ADMIN — CEFEPIME HYDROCHLORIDE 2000 MG: 2 INJECTION, POWDER, FOR SOLUTION INTRAVENOUS at 00:54

## 2021-01-01 RX ADMIN — LACTULOSE 20 G: 20 SOLUTION ORAL at 00:42

## 2021-01-01 RX ADMIN — LACTULOSE 20 G: 20 SOLUTION ORAL at 08:04

## 2021-01-01 RX ADMIN — Medication 200 MCG/HR: at 17:46

## 2021-01-01 RX ADMIN — ALBUMIN (HUMAN) 25 G: 0.25 INJECTION, SOLUTION INTRAVENOUS at 18:41

## 2021-01-01 RX ADMIN — Medication 4 MCG/MIN: at 03:43

## 2021-01-01 RX ADMIN — SODIUM CHLORIDE 8 MG/HR: 9 INJECTION, SOLUTION INTRAVENOUS at 23:51

## 2021-01-01 RX ADMIN — Medication 75 MCG/HR: at 21:02

## 2021-01-01 RX ADMIN — Medication 10 ML: at 04:08

## 2021-01-01 RX ADMIN — OCTREOTIDE ACETATE 25 MCG/HR: 500 INJECTION, SOLUTION INTRAVENOUS; SUBCUTANEOUS at 23:51

## 2021-01-01 RX ADMIN — LACTULOSE 20 G: 20 SOLUTION ORAL at 20:53

## 2021-01-01 RX ADMIN — ALBUMIN (HUMAN) 25 G: 0.25 INJECTION, SOLUTION INTRAVENOUS at 05:19

## 2021-01-01 RX ADMIN — FOLIC ACID: 5 INJECTION, SOLUTION INTRAMUSCULAR; INTRAVENOUS; SUBCUTANEOUS at 12:38

## 2021-01-01 RX ADMIN — SENNOSIDES AND DOCUSATE SODIUM 2 TABLET: 50; 8.6 TABLET ORAL at 21:15

## 2021-01-01 RX ADMIN — POLYETHYLENE GLYCOL 3350 17 G: 17 POWDER, FOR SOLUTION ORAL at 10:01

## 2021-01-01 RX ADMIN — LACTULOSE 20 G: 20 SOLUTION ORAL at 04:08

## 2021-01-01 RX ADMIN — POTASSIUM CHLORIDE 20 MEQ: 400 INJECTION, SOLUTION INTRAVENOUS at 16:08

## 2021-01-01 RX ADMIN — SODIUM CHLORIDE, POTASSIUM CHLORIDE, SODIUM LACTATE AND CALCIUM CHLORIDE: 600; 310; 30; 20 INJECTION, SOLUTION INTRAVENOUS at 11:29

## 2021-01-01 RX ADMIN — PHYTONADIONE 10 MG: 10 INJECTION, EMULSION INTRAMUSCULAR; INTRAVENOUS; SUBCUTANEOUS at 09:16

## 2021-01-01 RX ADMIN — SODIUM CHLORIDE 1000 ML: 9 INJECTION, SOLUTION INTRAVENOUS at 15:46

## 2021-01-01 RX ADMIN — POTASSIUM CHLORIDE 20 MEQ: 400 INJECTION, SOLUTION INTRAVENOUS at 14:45

## 2021-01-01 RX ADMIN — IPRATROPIUM BROMIDE AND ALBUTEROL SULFATE 1 AMPULE: .5; 3 SOLUTION RESPIRATORY (INHALATION) at 12:32

## 2021-01-01 RX ADMIN — PIPERACILLIN AND TAZOBACTAM 3375 MG: 3; .375 INJECTION, POWDER, LYOPHILIZED, FOR SOLUTION INTRAVENOUS at 10:57

## 2021-01-01 RX ADMIN — LACTULOSE 20 G: 20 SOLUTION ORAL at 00:14

## 2021-01-01 RX ADMIN — Medication 125 MCG/HR: at 17:15

## 2021-01-01 RX ADMIN — Medication 100 MCG/HR: at 15:37

## 2021-01-01 RX ADMIN — Medication 10 ML: at 20:41

## 2021-01-01 RX ADMIN — IPRATROPIUM BROMIDE AND ALBUTEROL SULFATE 1 AMPULE: .5; 3 SOLUTION RESPIRATORY (INHALATION) at 12:12

## 2021-01-01 RX ADMIN — SODIUM BICARBONATE: 84 INJECTION, SOLUTION INTRAVENOUS at 04:47

## 2021-01-01 RX ADMIN — RIFAXIMIN 550 MG: 550 TABLET ORAL at 09:17

## 2021-01-01 RX ADMIN — POTASSIUM CHLORIDE 20 MEQ: 400 INJECTION, SOLUTION INTRAVENOUS at 06:38

## 2021-01-01 RX ADMIN — RIFAXIMIN 550 MG: 550 TABLET ORAL at 10:00

## 2021-01-01 RX ADMIN — PROPOFOL 20 MCG/KG/MIN: 10 INJECTION, EMULSION INTRAVENOUS at 15:39

## 2021-01-01 RX ADMIN — OCTREOTIDE ACETATE 25 MCG/HR: 500 INJECTION, SOLUTION INTRAVENOUS; SUBCUTANEOUS at 02:32

## 2021-01-01 RX ADMIN — POLYETHYLENE GLYCOL 3350 17 G: 17 POWDER, FOR SOLUTION ORAL at 12:58

## 2021-01-01 RX ADMIN — Medication 12 MCG/MIN: at 08:14

## 2021-01-01 RX ADMIN — Medication 125 MCG/HR: at 11:08

## 2021-01-01 RX ADMIN — Medication 100 MCG/HR: at 16:47

## 2021-01-01 RX ADMIN — SODIUM CHLORIDE 8 MG/HR: 9 INJECTION, SOLUTION INTRAVENOUS at 04:28

## 2021-01-01 RX ADMIN — RIFAXIMIN 550 MG: 550 TABLET ORAL at 09:28

## 2021-01-01 RX ADMIN — POTASSIUM CHLORIDE 10 MEQ: 7.46 INJECTION, SOLUTION INTRAVENOUS at 00:52

## 2021-01-01 RX ADMIN — SODIUM CHLORIDE, POTASSIUM CHLORIDE, SODIUM LACTATE AND CALCIUM CHLORIDE: 600; 310; 30; 20 INJECTION, SOLUTION INTRAVENOUS at 22:50

## 2021-01-01 RX ADMIN — SODIUM CHLORIDE: 9 INJECTION, SOLUTION INTRAVENOUS at 20:14

## 2021-01-01 RX ADMIN — POTASSIUM CHLORIDE 20 MEQ: 400 INJECTION, SOLUTION INTRAVENOUS at 14:15

## 2021-01-01 RX ADMIN — IPRATROPIUM BROMIDE AND ALBUTEROL SULFATE 1 AMPULE: .5; 3 SOLUTION RESPIRATORY (INHALATION) at 21:45

## 2021-01-01 RX ADMIN — POTASSIUM CHLORIDE 10 MEQ: 7.46 INJECTION, SOLUTION INTRAVENOUS at 05:51

## 2021-01-01 RX ADMIN — Medication 100 MCG/HR: at 21:39

## 2021-01-01 RX ADMIN — POTASSIUM CHLORIDE 20 MEQ: 400 INJECTION, SOLUTION INTRAVENOUS at 05:41

## 2021-01-01 RX ADMIN — LACTULOSE 20 G: 20 SOLUTION ORAL at 00:02

## 2021-01-01 RX ADMIN — ALBUMIN (HUMAN) 25 G: 0.25 INJECTION, SOLUTION INTRAVENOUS at 16:27

## 2021-01-01 RX ADMIN — LACTULOSE 20 G: 20 SOLUTION ORAL at 08:21

## 2021-01-01 RX ADMIN — IPRATROPIUM BROMIDE AND ALBUTEROL SULFATE 1 AMPULE: .5; 3 SOLUTION RESPIRATORY (INHALATION) at 08:34

## 2021-01-01 RX ADMIN — IPRATROPIUM BROMIDE AND ALBUTEROL SULFATE 1 AMPULE: .5; 3 SOLUTION RESPIRATORY (INHALATION) at 20:24

## 2021-01-01 RX ADMIN — Medication 75 MCG/HR: at 03:26

## 2021-01-01 RX ADMIN — IPRATROPIUM BROMIDE AND ALBUTEROL SULFATE 1 AMPULE: .5; 3 SOLUTION RESPIRATORY (INHALATION) at 17:00

## 2021-01-01 RX ADMIN — Medication 15 MCG/MIN: at 04:32

## 2021-01-01 RX ADMIN — ALBUMIN (HUMAN) 25 G: 0.25 INJECTION, SOLUTION INTRAVENOUS at 23:49

## 2021-01-01 RX ADMIN — POTASSIUM CHLORIDE 20 MEQ: 400 INJECTION, SOLUTION INTRAVENOUS at 10:18

## 2021-01-01 RX ADMIN — SODIUM BICARBONATE: 84 INJECTION, SOLUTION INTRAVENOUS at 12:12

## 2021-01-01 RX ADMIN — POTASSIUM CHLORIDE 20 MEQ: 400 INJECTION, SOLUTION INTRAVENOUS at 00:42

## 2021-01-01 RX ADMIN — LEVOFLOXACIN 750 MG: 5 INJECTION, SOLUTION INTRAVENOUS at 17:16

## 2021-01-01 RX ADMIN — Medication 125 MCG/HR: at 04:10

## 2021-01-01 RX ADMIN — PANTOPRAZOLE SODIUM 40 MG: 40 INJECTION, POWDER, FOR SOLUTION INTRAVENOUS at 08:38

## 2021-01-01 RX ADMIN — RIFAXIMIN 550 MG: 550 TABLET ORAL at 21:03

## 2021-01-01 RX ADMIN — POTASSIUM CHLORIDE 20 MEQ: 400 INJECTION, SOLUTION INTRAVENOUS at 09:32

## 2021-01-01 RX ADMIN — Medication 25 MCG/HR: at 13:48

## 2021-01-01 RX ADMIN — SODIUM CHLORIDE, POTASSIUM CHLORIDE, SODIUM LACTATE AND CALCIUM CHLORIDE 1000 ML: 600; 310; 30; 20 INJECTION, SOLUTION INTRAVENOUS at 22:25

## 2021-01-01 RX ADMIN — Medication 5 MCG/MIN: at 11:57

## 2021-01-01 RX ADMIN — POTASSIUM CHLORIDE 20 MEQ: 400 INJECTION, SOLUTION INTRAVENOUS at 08:29

## 2021-01-01 RX ADMIN — MORPHINE SULFATE 4 MG: 4 INJECTION INTRAVENOUS at 17:56

## 2021-01-01 RX ADMIN — Medication 10 MCG/MIN: at 22:43

## 2021-01-01 RX ADMIN — Medication 100 MCG/HR: at 03:14

## 2021-01-01 RX ADMIN — LACTULOSE 20 G: 20 SOLUTION ORAL at 09:52

## 2021-01-01 RX ADMIN — PIPERACILLIN AND TAZOBACTAM 3375 MG: 3; .375 INJECTION, POWDER, LYOPHILIZED, FOR SOLUTION INTRAVENOUS at 16:57

## 2021-01-01 RX ADMIN — RIFAXIMIN 550 MG: 550 TABLET ORAL at 08:39

## 2021-01-01 RX ADMIN — FOLIC ACID: 5 INJECTION, SOLUTION INTRAMUSCULAR; INTRAVENOUS; SUBCUTANEOUS at 10:19

## 2021-01-01 RX ADMIN — SODIUM CHLORIDE 8 MG/HR: 9 INJECTION, SOLUTION INTRAVENOUS at 15:21

## 2021-01-01 RX ADMIN — LACTULOSE 20 G: 20 SOLUTION ORAL at 05:10

## 2021-01-01 RX ADMIN — Medication 25 MCG/HR: at 00:35

## 2021-01-01 RX ADMIN — MIDAZOLAM HYDROCHLORIDE 5 MG: 1 INJECTION, SOLUTION INTRAMUSCULAR; INTRAVENOUS at 03:25

## 2021-01-01 RX ADMIN — ETOMIDATE 20 MG: 20 INJECTION, SOLUTION INTRAVENOUS at 22:28

## 2021-01-01 RX ADMIN — IPRATROPIUM BROMIDE AND ALBUTEROL SULFATE 1 AMPULE: .5; 3 SOLUTION RESPIRATORY (INHALATION) at 16:55

## 2021-01-01 RX ADMIN — Medication 100 MCG/HR: at 09:28

## 2021-01-01 RX ADMIN — DEXMEDETOMIDINE HYDROCHLORIDE 1.1 MCG/KG/HR: 4 INJECTION, SOLUTION INTRAVENOUS at 16:39

## 2021-01-01 RX ADMIN — PANTOPRAZOLE SODIUM 40 MG: 40 INJECTION, POWDER, FOR SOLUTION INTRAVENOUS at 09:18

## 2021-01-01 RX ADMIN — PROPOFOL 35 MCG/KG/MIN: 10 INJECTION, EMULSION INTRAVENOUS at 20:07

## 2021-01-01 RX ADMIN — POTASSIUM CHLORIDE 20 MEQ: 400 INJECTION, SOLUTION INTRAVENOUS at 22:50

## 2021-01-01 RX ADMIN — LACTULOSE 20 G: 20 SOLUTION ORAL at 12:07

## 2021-01-01 RX ADMIN — LACTULOSE 20 G: 20 SOLUTION ORAL at 16:30

## 2021-01-01 RX ADMIN — PHYTONADIONE 5 MG: 5 TABLET ORAL at 12:58

## 2021-01-01 RX ADMIN — RIFAXIMIN 550 MG: 550 TABLET ORAL at 20:34

## 2021-01-01 RX ADMIN — DEXTROSE MONOHYDRATE 25 G: 25 INJECTION, SOLUTION INTRAVENOUS at 06:55

## 2021-01-01 RX ADMIN — LACTULOSE 20 G: 20 SOLUTION ORAL at 08:19

## 2021-01-01 RX ADMIN — LACTULOSE 20 G: 20 SOLUTION ORAL at 00:32

## 2021-01-01 RX ADMIN — IPRATROPIUM BROMIDE AND ALBUTEROL SULFATE 1 AMPULE: .5; 3 SOLUTION RESPIRATORY (INHALATION) at 11:55

## 2021-01-01 RX ADMIN — RIFAXIMIN 550 MG: 550 TABLET ORAL at 10:01

## 2021-01-01 RX ADMIN — LACTULOSE 40 G: 20 SOLUTION ORAL at 00:25

## 2021-01-01 RX ADMIN — RIFAXIMIN 550 MG: 550 TABLET ORAL at 08:04

## 2021-01-01 RX ADMIN — SENNOSIDES AND DOCUSATE SODIUM 2 TABLET: 50; 8.6 TABLET ORAL at 10:01

## 2021-01-01 RX ADMIN — LORAZEPAM 4 MG: 2 INJECTION INTRAMUSCULAR; INTRAVENOUS at 16:22

## 2021-01-01 RX ADMIN — POTASSIUM CHLORIDE 20 MEQ: 400 INJECTION, SOLUTION INTRAVENOUS at 08:49

## 2021-01-01 RX ADMIN — Medication 10 ML: at 09:53

## 2021-01-01 RX ADMIN — Medication 10 ML: at 21:15

## 2021-01-01 RX ADMIN — LACTULOSE 20 G: 20 SOLUTION ORAL at 06:30

## 2021-01-01 RX ADMIN — Medication 1 CAPSULE: at 17:44

## 2021-01-01 RX ADMIN — METRONIDAZOLE 500 MG: 500 INJECTION, SOLUTION INTRAVENOUS at 03:21

## 2021-01-01 RX ADMIN — Medication 125 MCG/HR: at 02:43

## 2021-01-01 RX ADMIN — IPRATROPIUM BROMIDE AND ALBUTEROL SULFATE 1 AMPULE: .5; 3 SOLUTION RESPIRATORY (INHALATION) at 15:37

## 2021-01-01 RX ADMIN — Medication 10 MCG/MIN: at 08:56

## 2021-01-01 RX ADMIN — Medication 2 MCG/MIN: at 15:52

## 2021-01-01 RX ADMIN — DEXMEDETOMIDINE HYDROCHLORIDE 0.5 MCG/KG/HR: 4 INJECTION, SOLUTION INTRAVENOUS at 12:31

## 2021-01-01 RX ADMIN — POTASSIUM CHLORIDE 20 MEQ: 400 INJECTION, SOLUTION INTRAVENOUS at 15:29

## 2021-01-01 RX ADMIN — Medication 100 MCG/HR: at 05:00

## 2021-01-01 RX ADMIN — Medication 100 MCG/HR: at 23:03

## 2021-01-01 RX ADMIN — Medication 10 ML: at 20:01

## 2021-01-01 RX ADMIN — ALBUMIN (HUMAN) 25 G: 0.25 INJECTION, SOLUTION INTRAVENOUS at 00:24

## 2021-01-01 RX ADMIN — ALBUMIN (HUMAN) 25 G: 0.25 INJECTION, SOLUTION INTRAVENOUS at 05:42

## 2021-01-01 RX ADMIN — ALBUMIN (HUMAN) 25 G: 0.25 INJECTION, SOLUTION INTRAVENOUS at 09:22

## 2021-01-01 RX ADMIN — POLYETHYLENE GLYCOL 3350 17 G: 17 POWDER, FOR SOLUTION ORAL at 09:17

## 2021-01-01 RX ADMIN — SODIUM BICARBONATE 50 MEQ: 84 INJECTION, SOLUTION INTRAVENOUS at 14:50

## 2021-01-01 RX ADMIN — HYDROCORTISONE SODIUM SUCCINATE 50 MG: 100 INJECTION, POWDER, FOR SOLUTION INTRAMUSCULAR; INTRAVENOUS at 23:10

## 2021-01-01 RX ADMIN — PIPERACILLIN AND TAZOBACTAM 3375 MG: 3; .375 INJECTION, POWDER, LYOPHILIZED, FOR SOLUTION INTRAVENOUS at 08:26

## 2021-01-01 RX ADMIN — METRONIDAZOLE 500 MG: 500 INJECTION, SOLUTION INTRAVENOUS at 13:34

## 2021-01-01 RX ADMIN — SENNOSIDES AND DOCUSATE SODIUM 2 TABLET: 50; 8.6 TABLET ORAL at 19:39

## 2021-01-01 RX ADMIN — Medication 100 MCG/HR: at 22:10

## 2021-01-01 RX ADMIN — DEXMEDETOMIDINE HYDROCHLORIDE 0.4 MCG/KG/HR: 4 INJECTION, SOLUTION INTRAVENOUS at 04:26

## 2021-01-01 RX ADMIN — RIFAXIMIN 550 MG: 550 TABLET ORAL at 08:26

## 2021-01-01 RX ADMIN — ALBUMIN (HUMAN) 25 G: 0.25 INJECTION, SOLUTION INTRAVENOUS at 12:12

## 2021-01-01 RX ADMIN — Medication 100 MCG/HR: at 04:40

## 2021-01-01 RX ADMIN — METRONIDAZOLE 500 MG: 500 INJECTION, SOLUTION INTRAVENOUS at 12:50

## 2021-01-01 RX ADMIN — POTASSIUM CHLORIDE 20 MEQ: 400 INJECTION, SOLUTION INTRAVENOUS at 11:25

## 2021-01-01 RX ADMIN — Medication 10 ML: at 09:17

## 2021-01-01 RX ADMIN — SODIUM CHLORIDE 8 MG/HR: 9 INJECTION, SOLUTION INTRAVENOUS at 15:43

## 2021-01-01 RX ADMIN — DEXTROSE MONOHYDRATE 170 MG: 50 INJECTION, SOLUTION INTRAVENOUS at 10:00

## 2021-01-01 RX ADMIN — IPRATROPIUM BROMIDE AND ALBUTEROL SULFATE 1 AMPULE: .5; 3 SOLUTION RESPIRATORY (INHALATION) at 08:21

## 2021-01-01 RX ADMIN — PIPERACILLIN AND TAZOBACTAM 3375 MG: 3; .375 INJECTION, POWDER, LYOPHILIZED, FOR SOLUTION INTRAVENOUS at 00:46

## 2021-01-01 RX ADMIN — IPRATROPIUM BROMIDE AND ALBUTEROL SULFATE 1 AMPULE: .5; 3 SOLUTION RESPIRATORY (INHALATION) at 21:09

## 2021-01-01 RX ADMIN — Medication 10 ML: at 20:07

## 2021-01-01 RX ADMIN — DEXTROSE MONOHYDRATE 170 MG: 50 INJECTION, SOLUTION INTRAVENOUS at 02:57

## 2021-01-01 RX ADMIN — IPRATROPIUM BROMIDE AND ALBUTEROL SULFATE 1 AMPULE: .5; 3 SOLUTION RESPIRATORY (INHALATION) at 08:02

## 2021-01-01 RX ADMIN — SODIUM CHLORIDE: 9 INJECTION, SOLUTION INTRAVENOUS at 22:12

## 2021-01-01 RX ADMIN — ALBUMIN (HUMAN) 25 G: 0.25 INJECTION, SOLUTION INTRAVENOUS at 03:21

## 2021-01-01 RX ADMIN — Medication 75 MCG/HR: at 16:37

## 2021-01-01 RX ADMIN — RIFAXIMIN 550 MG: 550 TABLET ORAL at 09:36

## 2021-01-01 RX ADMIN — LACTULOSE 20 G: 20 SOLUTION ORAL at 00:00

## 2021-01-01 RX ADMIN — SODIUM CHLORIDE 8 MG/HR: 9 INJECTION, SOLUTION INTRAVENOUS at 23:50

## 2021-01-01 RX ADMIN — SODIUM CHLORIDE 8 MG/HR: 9 INJECTION, SOLUTION INTRAVENOUS at 20:25

## 2021-01-01 RX ADMIN — PIPERACILLIN AND TAZOBACTAM 3375 MG: 3; .375 INJECTION, POWDER, LYOPHILIZED, FOR SOLUTION INTRAVENOUS at 00:01

## 2021-01-01 RX ADMIN — LACTULOSE 20 G: 20 SOLUTION ORAL at 15:16

## 2021-01-01 RX ADMIN — Medication 10 ML: at 20:38

## 2021-01-01 RX ADMIN — Medication 75 MCG/HR: at 10:35

## 2021-01-01 RX ADMIN — Medication 1000 MG: at 03:08

## 2021-01-01 RX ADMIN — Medication 200 MCG/HR: at 05:18

## 2021-01-01 RX ADMIN — RIFAXIMIN 550 MG: 550 TABLET ORAL at 20:53

## 2021-01-01 RX ADMIN — DEXMEDETOMIDINE HYDROCHLORIDE 0.3 MCG/KG/HR: 4 INJECTION, SOLUTION INTRAVENOUS at 13:02

## 2021-01-01 RX ADMIN — Medication 14 MCG/MIN: at 19:36

## 2021-01-01 RX ADMIN — LACTULOSE 20 G: 20 SOLUTION ORAL at 09:28

## 2021-01-01 RX ADMIN — Medication 200 MCG/HR: at 02:12

## 2021-01-01 RX ADMIN — Medication 10 ML: at 20:14

## 2021-01-01 RX ADMIN — LORAZEPAM 4 MG: 2 INJECTION INTRAMUSCULAR; INTRAVENOUS at 17:57

## 2021-01-01 RX ADMIN — ALBUMIN (HUMAN) 25 G: 0.25 INJECTION, SOLUTION INTRAVENOUS at 16:42

## 2021-01-01 RX ADMIN — IPRATROPIUM BROMIDE AND ALBUTEROL SULFATE 1 AMPULE: .5; 3 SOLUTION RESPIRATORY (INHALATION) at 11:29

## 2021-01-01 RX ADMIN — POTASSIUM CHLORIDE 20 MEQ: 400 INJECTION, SOLUTION INTRAVENOUS at 14:05

## 2021-01-01 RX ADMIN — Medication 200 MCG/HR: at 21:03

## 2021-01-01 RX ADMIN — ALBUMIN (HUMAN) 25 G: 0.25 INJECTION, SOLUTION INTRAVENOUS at 23:59

## 2021-01-01 RX ADMIN — Medication 1 CAPSULE: at 09:17

## 2021-01-01 RX ADMIN — SENNOSIDES AND DOCUSATE SODIUM 2 TABLET: 50; 8.6 TABLET ORAL at 09:12

## 2021-01-01 RX ADMIN — POTASSIUM CHLORIDE 20 MEQ: 400 INJECTION, SOLUTION INTRAVENOUS at 07:07

## 2021-01-01 RX ADMIN — Medication 175 MCG/HR: at 18:24

## 2021-01-01 RX ADMIN — PANTOPRAZOLE SODIUM 40 MG: 40 INJECTION, POWDER, FOR SOLUTION INTRAVENOUS at 20:52

## 2021-01-01 RX ADMIN — IPRATROPIUM BROMIDE AND ALBUTEROL SULFATE 1 AMPULE: .5; 3 SOLUTION RESPIRATORY (INHALATION) at 11:59

## 2021-01-01 RX ADMIN — IPRATROPIUM BROMIDE AND ALBUTEROL SULFATE 1 AMPULE: .5; 3 SOLUTION RESPIRATORY (INHALATION) at 12:46

## 2021-01-01 RX ADMIN — Medication 100 MCG/HR: at 10:06

## 2021-01-01 RX ADMIN — PANTOPRAZOLE SODIUM 40 MG: 40 INJECTION, POWDER, FOR SOLUTION INTRAVENOUS at 09:06

## 2021-01-01 RX ADMIN — Medication 2 MCG/MIN: at 14:02

## 2021-01-01 RX ADMIN — LIDOCAINE HYDROCHLORIDE 10 ML: 10 INJECTION, SOLUTION EPIDURAL; INFILTRATION; INTRACAUDAL; PERINEURAL at 15:10

## 2021-01-01 RX ADMIN — ROCURONIUM BROMIDE 100 MG: 10 INJECTION, SOLUTION INTRAVENOUS at 22:28

## 2021-01-01 RX ADMIN — Medication 14 MCG/MIN: at 01:28

## 2021-01-01 RX ADMIN — PANTOPRAZOLE SODIUM 40 MG: 40 INJECTION, POWDER, FOR SOLUTION INTRAVENOUS at 19:39

## 2021-01-01 RX ADMIN — LEVOFLOXACIN 750 MG: 5 INJECTION, SOLUTION INTRAVENOUS at 16:26

## 2021-01-01 RX ADMIN — VASOPRESSIN 0.03 UNITS/MIN: 20 INJECTION INTRAVENOUS at 00:58

## 2021-01-01 RX ADMIN — MIDAZOLAM 1 MG/HR: 5 INJECTION, SOLUTION INTRAMUSCULAR; INTRAVENOUS at 12:15

## 2021-01-01 RX ADMIN — LACTULOSE 20 G: 20 SOLUTION ORAL at 10:00

## 2021-01-01 RX ADMIN — LACTULOSE 20 G: 20 SOLUTION ORAL at 00:46

## 2021-01-01 RX ADMIN — POTASSIUM CHLORIDE 20 MEQ: 400 INJECTION, SOLUTION INTRAVENOUS at 00:14

## 2021-01-01 RX ADMIN — IPRATROPIUM BROMIDE AND ALBUTEROL SULFATE 1 AMPULE: .5; 3 SOLUTION RESPIRATORY (INHALATION) at 19:45

## 2021-01-01 RX ADMIN — POTASSIUM CHLORIDE 20 MEQ: 400 INJECTION, SOLUTION INTRAVENOUS at 16:39

## 2021-01-01 RX ADMIN — DEXTROSE MONOHYDRATE 25 G: 25 INJECTION, SOLUTION INTRAVENOUS at 06:43

## 2021-01-01 RX ADMIN — VANCOMYCIN HYDROCHLORIDE 1500 MG: 10 INJECTION, POWDER, LYOPHILIZED, FOR SOLUTION INTRAVENOUS at 01:32

## 2021-01-01 RX ADMIN — LACTULOSE 20 G: 20 SOLUTION ORAL at 15:33

## 2021-01-01 RX ADMIN — DEXMEDETOMIDINE HYDROCHLORIDE 0.4 MCG/KG/HR: 4 INJECTION, SOLUTION INTRAVENOUS at 22:19

## 2021-01-01 RX ADMIN — DEXMEDETOMIDINE HYDROCHLORIDE 1 MCG/KG/HR: 4 INJECTION, SOLUTION INTRAVENOUS at 01:49

## 2021-01-01 RX ADMIN — RIFAXIMIN 550 MG: 550 TABLET ORAL at 20:52

## 2021-01-01 RX ADMIN — Medication 10 ML: at 20:53

## 2021-01-01 RX ADMIN — Medication 125 MCG/HR: at 08:08

## 2021-01-01 RX ADMIN — CEFTRIAXONE 2000 MG: 2 INJECTION, POWDER, FOR SOLUTION INTRAMUSCULAR; INTRAVENOUS at 09:40

## 2021-01-01 RX ADMIN — IPRATROPIUM BROMIDE AND ALBUTEROL SULFATE 1 AMPULE: .5; 3 SOLUTION RESPIRATORY (INHALATION) at 08:57

## 2021-01-01 RX ADMIN — IPRATROPIUM BROMIDE AND ALBUTEROL SULFATE 1 AMPULE: .5; 3 SOLUTION RESPIRATORY (INHALATION) at 12:15

## 2021-01-01 RX ADMIN — Medication 250 MG: at 21:17

## 2021-01-01 RX ADMIN — POTASSIUM CHLORIDE 20 MEQ: 400 INJECTION, SOLUTION INTRAVENOUS at 17:14

## 2021-01-01 RX ADMIN — ALBUMIN (HUMAN) 50 G: 0.25 INJECTION, SOLUTION INTRAVENOUS at 23:14

## 2021-01-01 RX ADMIN — Medication 100 MCG/HR: at 10:28

## 2021-01-01 RX ADMIN — IPRATROPIUM BROMIDE AND ALBUTEROL SULFATE 1 AMPULE: .5; 3 SOLUTION RESPIRATORY (INHALATION) at 19:40

## 2021-01-01 RX ADMIN — Medication 10 ML: at 21:01

## 2021-01-01 RX ADMIN — PANTOPRAZOLE SODIUM 40 MG: 40 INJECTION, POWDER, FOR SOLUTION INTRAVENOUS at 10:01

## 2021-01-01 RX ADMIN — DEXMEDETOMIDINE HYDROCHLORIDE 0.3 MCG/KG/HR: 4 INJECTION, SOLUTION INTRAVENOUS at 02:09

## 2021-01-01 RX ADMIN — LEVOFLOXACIN 750 MG: 5 INJECTION, SOLUTION INTRAVENOUS at 18:19

## 2021-01-01 RX ADMIN — LACTULOSE 20 G: 20 SOLUTION ORAL at 20:33

## 2021-01-01 RX ADMIN — POLYETHYLENE GLYCOL 3350 17 G: 17 POWDER, FOR SOLUTION ORAL at 09:12

## 2021-01-01 RX ADMIN — LACTULOSE 20 G: 20 SOLUTION ORAL at 01:10

## 2021-01-01 RX ADMIN — DEXMEDETOMIDINE HYDROCHLORIDE 0.2 MCG/KG/HR: 4 INJECTION, SOLUTION INTRAVENOUS at 12:07

## 2021-01-01 RX ADMIN — IPRATROPIUM BROMIDE AND ALBUTEROL SULFATE 1 AMPULE: .5; 3 SOLUTION RESPIRATORY (INHALATION) at 15:35

## 2021-01-01 RX ADMIN — PIPERACILLIN AND TAZOBACTAM 3375 MG: 3; .375 INJECTION, POWDER, LYOPHILIZED, FOR SOLUTION INTRAVENOUS at 00:16

## 2021-01-01 RX ADMIN — IPRATROPIUM BROMIDE AND ALBUTEROL SULFATE 1 AMPULE: .5; 3 SOLUTION RESPIRATORY (INHALATION) at 19:43

## 2021-01-01 RX ADMIN — RIFAXIMIN 550 MG: 550 TABLET ORAL at 11:47

## 2021-01-01 RX ADMIN — RIFAXIMIN 550 MG: 550 TABLET ORAL at 20:11

## 2021-01-01 RX ADMIN — PHYTONADIONE 10 MG: 10 INJECTION, EMULSION INTRAMUSCULAR; INTRAVENOUS; SUBCUTANEOUS at 02:22

## 2021-01-01 RX ADMIN — POTASSIUM CHLORIDE 20 MEQ: 400 INJECTION, SOLUTION INTRAVENOUS at 18:01

## 2021-01-01 RX ADMIN — IPRATROPIUM BROMIDE AND ALBUTEROL SULFATE 1 AMPULE: .5; 3 SOLUTION RESPIRATORY (INHALATION) at 15:30

## 2021-01-01 RX ADMIN — LACTULOSE 20 G: 20 SOLUTION ORAL at 12:00

## 2021-01-01 RX ADMIN — RIFAXIMIN 550 MG: 550 TABLET ORAL at 20:33

## 2021-01-01 RX ADMIN — PIPERACILLIN AND TAZOBACTAM 3375 MG: 3; .375 INJECTION, POWDER, LYOPHILIZED, FOR SOLUTION INTRAVENOUS at 16:44

## 2021-01-01 RX ADMIN — FOLIC ACID: 5 INJECTION, SOLUTION INTRAMUSCULAR; INTRAVENOUS; SUBCUTANEOUS at 09:53

## 2021-01-01 RX ADMIN — Medication 100 MCG/HR: at 02:38

## 2021-01-01 RX ADMIN — IPRATROPIUM BROMIDE AND ALBUTEROL SULFATE 1 AMPULE: .5; 3 SOLUTION RESPIRATORY (INHALATION) at 20:33

## 2021-01-01 RX ADMIN — PROPOFOL 10 MCG/KG/MIN: 10 INJECTION, EMULSION INTRAVENOUS at 06:12

## 2021-01-01 RX ADMIN — DEXTROSE MONOHYDRATE 505 MG: 50 INJECTION, SOLUTION INTRAVENOUS at 21:10

## 2021-01-01 RX ADMIN — PIPERACILLIN AND TAZOBACTAM 3375 MG: 3; .375 INJECTION, POWDER, LYOPHILIZED, FOR SOLUTION INTRAVENOUS at 00:24

## 2021-01-01 RX ADMIN — Medication 200 MCG/HR: at 08:03

## 2021-01-01 RX ADMIN — SENNOSIDES AND DOCUSATE SODIUM 2 TABLET: 50; 8.6 TABLET ORAL at 21:17

## 2021-01-01 RX ADMIN — Medication 100 MCG/HR: at 14:39

## 2021-01-01 RX ADMIN — MIDAZOLAM 1 MG/HR: 5 INJECTION, SOLUTION INTRAMUSCULAR; INTRAVENOUS at 22:07

## 2021-01-01 RX ADMIN — PANTOPRAZOLE SODIUM 40 MG: 40 INJECTION, POWDER, FOR SOLUTION INTRAVENOUS at 20:28

## 2021-01-01 RX ADMIN — SODIUM CHLORIDE 1000 ML: 9 INJECTION, SOLUTION INTRAVENOUS at 18:24

## 2021-01-01 RX ADMIN — VASOPRESSIN 0.03 UNITS/MIN: 20 INJECTION INTRAVENOUS at 03:53

## 2021-01-01 RX ADMIN — DEXMEDETOMIDINE HYDROCHLORIDE 0.5 MCG/KG/HR: 4 INJECTION, SOLUTION INTRAVENOUS at 02:39

## 2021-01-01 RX ADMIN — Medication 100 MCG/HR: at 17:14

## 2021-01-01 RX ADMIN — RIFAXIMIN 550 MG: 550 TABLET ORAL at 21:00

## 2021-01-01 RX ADMIN — POLYETHYLENE GLYCOL 3350, SODIUM SULFATE ANHYDROUS, SODIUM BICARBONATE, SODIUM CHLORIDE, POTASSIUM CHLORIDE 4000 ML: 236; 22.74; 6.74; 5.86; 2.97 POWDER, FOR SOLUTION ORAL at 16:31

## 2021-01-01 RX ADMIN — Medication 100 MCG/HR: at 04:05

## 2021-01-01 RX ADMIN — OCTREOTIDE ACETATE 25 MCG/HR: 500 INJECTION, SOLUTION INTRAVENOUS; SUBCUTANEOUS at 12:32

## 2021-01-01 RX ADMIN — Medication 100 MCG/HR: at 04:14

## 2021-01-01 RX ADMIN — POTASSIUM CHLORIDE 20 MEQ: 400 INJECTION, SOLUTION INTRAVENOUS at 05:35

## 2021-01-01 RX ADMIN — SODIUM CHLORIDE 8 MG/HR: 9 INJECTION, SOLUTION INTRAVENOUS at 04:11

## 2021-01-01 RX ADMIN — LACTULOSE 20 G: 20 SOLUTION ORAL at 11:59

## 2021-01-01 RX ADMIN — POTASSIUM CHLORIDE 20 MEQ: 400 INJECTION, SOLUTION INTRAVENOUS at 09:40

## 2021-01-01 RX ADMIN — IPRATROPIUM BROMIDE AND ALBUTEROL SULFATE 1 AMPULE: .5; 3 SOLUTION RESPIRATORY (INHALATION) at 08:04

## 2021-01-01 RX ADMIN — IPRATROPIUM BROMIDE AND ALBUTEROL SULFATE 1 AMPULE: .5; 3 SOLUTION RESPIRATORY (INHALATION) at 20:45

## 2021-01-01 RX ADMIN — PANTOPRAZOLE SODIUM 40 MG: 40 INJECTION, POWDER, FOR SOLUTION INTRAVENOUS at 20:12

## 2021-01-01 RX ADMIN — LACTULOSE 20 G: 20 SOLUTION ORAL at 04:16

## 2021-01-01 RX ADMIN — METRONIDAZOLE 500 MG: 500 INJECTION, SOLUTION INTRAVENOUS at 21:19

## 2021-01-01 RX ADMIN — Medication 100 MCG/HR: at 09:53

## 2021-01-01 RX ADMIN — RIFAXIMIN 550 MG: 550 TABLET ORAL at 21:17

## 2021-01-01 RX ADMIN — OCTREOTIDE ACETATE 25 MCG/HR: 500 INJECTION, SOLUTION INTRAVENOUS; SUBCUTANEOUS at 03:40

## 2021-01-01 RX ADMIN — IPRATROPIUM BROMIDE AND ALBUTEROL SULFATE 1 AMPULE: .5; 3 SOLUTION RESPIRATORY (INHALATION) at 12:03

## 2021-01-01 RX ADMIN — IPRATROPIUM BROMIDE AND ALBUTEROL SULFATE 1 AMPULE: .5; 3 SOLUTION RESPIRATORY (INHALATION) at 08:03

## 2021-01-01 RX ADMIN — Medication 125 MCG/HR: at 23:18

## 2021-01-01 RX ADMIN — LACTULOSE 20 G: 20 SOLUTION ORAL at 22:44

## 2021-01-01 RX ADMIN — LACTULOSE 20 G: 20 SOLUTION ORAL at 13:00

## 2021-01-01 RX ADMIN — POTASSIUM CHLORIDE 20 MEQ: 400 INJECTION, SOLUTION INTRAVENOUS at 16:00

## 2021-01-01 RX ADMIN — PANTOPRAZOLE SODIUM 40 MG: 40 INJECTION, POWDER, FOR SOLUTION INTRAVENOUS at 20:33

## 2021-01-01 RX ADMIN — ALBUMIN (HUMAN) 25 G: 0.25 INJECTION, SOLUTION INTRAVENOUS at 12:02

## 2021-01-01 RX ADMIN — Medication 100 MCG/HR: at 12:15

## 2021-01-01 RX ADMIN — LACTULOSE 20 G: 20 SOLUTION ORAL at 21:00

## 2021-01-01 RX ADMIN — Medication 150 MCG/HR: at 19:37

## 2021-01-01 RX ADMIN — LACTULOSE 20 G: 20 SOLUTION ORAL at 21:03

## 2021-01-01 RX ADMIN — PHYTONADIONE 10 MG: 10 INJECTION, EMULSION INTRAMUSCULAR; INTRAVENOUS; SUBCUTANEOUS at 08:32

## 2021-01-01 RX ADMIN — PANTOPRAZOLE SODIUM 40 MG: 40 INJECTION, POWDER, FOR SOLUTION INTRAVENOUS at 09:12

## 2021-01-01 RX ADMIN — ALBUMIN (HUMAN) 25 G: 0.25 INJECTION, SOLUTION INTRAVENOUS at 22:03

## 2021-01-01 RX ADMIN — POTASSIUM CHLORIDE 20 MEQ: 400 INJECTION, SOLUTION INTRAVENOUS at 22:44

## 2021-01-01 RX ADMIN — MIDAZOLAM 4 MG/HR: 5 INJECTION, SOLUTION INTRAMUSCULAR; INTRAVENOUS at 16:17

## 2021-01-01 RX ADMIN — PROPOFOL 20 MCG/KG/MIN: 10 INJECTION, EMULSION INTRAVENOUS at 01:36

## 2021-01-01 RX ADMIN — LACTULOSE 20 G: 20 SOLUTION ORAL at 09:14

## 2021-01-01 RX ADMIN — SODIUM CHLORIDE: 9 INJECTION, SOLUTION INTRAVENOUS at 04:05

## 2021-01-01 RX ADMIN — PIPERACILLIN AND TAZOBACTAM 3375 MG: 3; .375 INJECTION, POWDER, LYOPHILIZED, FOR SOLUTION INTRAVENOUS at 10:41

## 2021-01-01 RX ADMIN — LACTULOSE 20 G: 20 SOLUTION ORAL at 16:44

## 2021-01-01 RX ADMIN — IPRATROPIUM BROMIDE AND ALBUTEROL SULFATE 1 AMPULE: .5; 3 SOLUTION RESPIRATORY (INHALATION) at 19:01

## 2021-01-01 RX ADMIN — LACTULOSE 20 G: 20 SOLUTION ORAL at 16:16

## 2021-01-01 RX ADMIN — LACTULOSE 20 G: 20 SOLUTION ORAL at 04:15

## 2021-01-01 RX ADMIN — PANTOPRAZOLE SODIUM 40 MG: 40 INJECTION, POWDER, FOR SOLUTION INTRAVENOUS at 07:53

## 2021-01-01 RX ADMIN — POTASSIUM CHLORIDE 20 MEQ: 400 INJECTION, SOLUTION INTRAVENOUS at 06:17

## 2021-01-01 RX ADMIN — Medication 40 MCG/MIN: at 23:14

## 2021-01-01 RX ADMIN — DEXTROSE MONOHYDRATE 170 MG: 50 INJECTION, SOLUTION INTRAVENOUS at 11:03

## 2021-01-01 RX ADMIN — LACTULOSE 20 G: 20 SOLUTION ORAL at 16:13

## 2021-01-01 RX ADMIN — IPRATROPIUM BROMIDE AND ALBUTEROL SULFATE 1 AMPULE: .5; 3 SOLUTION RESPIRATORY (INHALATION) at 08:17

## 2021-01-01 RX ADMIN — FENTANYL CITRATE 25 MCG: 50 INJECTION, SOLUTION INTRAMUSCULAR; INTRAVENOUS at 02:53

## 2021-01-01 RX ADMIN — Medication 250 MG: at 09:17

## 2021-01-01 RX ADMIN — VASOPRESSIN 0.03 UNITS/MIN: 20 INJECTION INTRAVENOUS at 09:19

## 2021-01-01 RX ADMIN — RIFAXIMIN 550 MG: 550 TABLET ORAL at 20:01

## 2021-01-01 RX ADMIN — RIFAXIMIN 550 MG: 550 TABLET ORAL at 20:06

## 2021-01-01 RX ADMIN — LACTULOSE 20 G: 20 SOLUTION ORAL at 04:26

## 2021-01-01 RX ADMIN — DEXMEDETOMIDINE HYDROCHLORIDE 1.3 MCG/KG/HR: 4 INJECTION, SOLUTION INTRAVENOUS at 20:34

## 2021-01-01 RX ADMIN — DEXMEDETOMIDINE HYDROCHLORIDE 1 MCG/HR: 4 INJECTION, SOLUTION INTRAVENOUS at 06:46

## 2021-01-01 RX ADMIN — IPRATROPIUM BROMIDE AND ALBUTEROL SULFATE 1 AMPULE: .5; 3 SOLUTION RESPIRATORY (INHALATION) at 16:37

## 2021-01-01 RX ADMIN — SENNOSIDES AND DOCUSATE SODIUM 2 TABLET: 50; 8.6 TABLET ORAL at 09:18

## 2021-01-01 RX ADMIN — IPRATROPIUM BROMIDE AND ALBUTEROL SULFATE 1 AMPULE: .5; 3 SOLUTION RESPIRATORY (INHALATION) at 12:31

## 2021-01-01 RX ADMIN — POTASSIUM CHLORIDE 20 MEQ: 400 INJECTION, SOLUTION INTRAVENOUS at 15:18

## 2021-01-01 RX ADMIN — IPRATROPIUM BROMIDE AND ALBUTEROL SULFATE 1 AMPULE: .5; 3 SOLUTION RESPIRATORY (INHALATION) at 08:25

## 2021-01-01 RX ADMIN — POTASSIUM CHLORIDE 20 MEQ: 400 INJECTION, SOLUTION INTRAVENOUS at 03:53

## 2021-01-01 RX ADMIN — PANTOPRAZOLE SODIUM 80 MG: 40 INJECTION, POWDER, FOR SOLUTION INTRAVENOUS at 02:23

## 2021-01-01 RX ADMIN — DEXMEDETOMIDINE HYDROCHLORIDE 1 MCG/KG/HR: 4 INJECTION, SOLUTION INTRAVENOUS at 11:29

## 2021-01-01 RX ADMIN — IPRATROPIUM BROMIDE AND ALBUTEROL SULFATE 1 AMPULE: .5; 3 SOLUTION RESPIRATORY (INHALATION) at 08:48

## 2021-01-01 RX ADMIN — SODIUM CHLORIDE 25 ML: 9 INJECTION, SOLUTION INTRAVENOUS at 21:36

## 2021-01-01 RX ADMIN — IPRATROPIUM BROMIDE AND ALBUTEROL SULFATE 1 AMPULE: .5; 3 SOLUTION RESPIRATORY (INHALATION) at 09:02

## 2021-01-01 RX ADMIN — RIFAXIMIN 550 MG: 550 TABLET ORAL at 09:06

## 2021-01-01 RX ADMIN — PROPOFOL 35 MCG/KG/MIN: 10 INJECTION, EMULSION INTRAVENOUS at 09:59

## 2021-01-01 RX ADMIN — Medication 10 ML: at 09:18

## 2021-01-01 RX ADMIN — DEXMEDETOMIDINE HYDROCHLORIDE 1 MCG/KG/HR: 4 INJECTION, SOLUTION INTRAVENOUS at 05:31

## 2021-01-01 RX ADMIN — POTASSIUM CHLORIDE 20 MEQ: 400 INJECTION, SOLUTION INTRAVENOUS at 06:12

## 2021-01-01 RX ADMIN — RIFAXIMIN 550 MG: 550 TABLET ORAL at 19:39

## 2021-01-01 RX ADMIN — PIPERACILLIN AND TAZOBACTAM 3375 MG: 3; .375 INJECTION, POWDER, LYOPHILIZED, FOR SOLUTION INTRAVENOUS at 08:14

## 2021-01-01 RX ADMIN — DEXMEDETOMIDINE HYDROCHLORIDE 1.1 MCG/KG/HR: 4 INJECTION, SOLUTION INTRAVENOUS at 11:39

## 2021-01-01 RX ADMIN — IPRATROPIUM BROMIDE AND ALBUTEROL SULFATE 1 AMPULE: .5; 3 SOLUTION RESPIRATORY (INHALATION) at 15:26

## 2021-01-01 RX ADMIN — LACTULOSE 20 G: 20 SOLUTION ORAL at 04:10

## 2021-01-01 RX ADMIN — Medication 40 MCG/MIN: at 06:37

## 2021-01-01 RX ADMIN — LORAZEPAM 2 MG: 2 INJECTION, SOLUTION INTRAMUSCULAR; INTRAVENOUS at 18:52

## 2021-01-01 RX ADMIN — PANTOPRAZOLE SODIUM 40 MG: 40 INJECTION, POWDER, FOR SOLUTION INTRAVENOUS at 21:17

## 2021-01-01 RX ADMIN — DEXTROSE MONOHYDRATE 170 MG: 50 INJECTION, SOLUTION INTRAVENOUS at 21:37

## 2021-01-01 RX ADMIN — FUROSEMIDE 20 MG: 10 INJECTION, SOLUTION INTRAMUSCULAR; INTRAVENOUS at 09:23

## 2021-01-01 RX ADMIN — DEXTROSE MONOHYDRATE 170 MG: 50 INJECTION, SOLUTION INTRAVENOUS at 11:27

## 2021-01-01 RX ADMIN — PIPERACILLIN AND TAZOBACTAM 3375 MG: 3; .375 INJECTION, POWDER, LYOPHILIZED, FOR SOLUTION INTRAVENOUS at 16:00

## 2021-01-01 RX ADMIN — Medication 100 MCG/HR: at 00:02

## 2021-01-01 RX ADMIN — DEXMEDETOMIDINE HYDROCHLORIDE 1.1 MCG/KG/HR: 4 INJECTION, SOLUTION INTRAVENOUS at 16:22

## 2021-01-01 RX ADMIN — Medication 125 MCG/HR: at 22:24

## 2021-01-01 RX ADMIN — IPRATROPIUM BROMIDE AND ALBUTEROL SULFATE 1 AMPULE: .5; 3 SOLUTION RESPIRATORY (INHALATION) at 12:41

## 2021-01-01 RX ADMIN — RIFAXIMIN 550 MG: 550 TABLET ORAL at 08:21

## 2021-01-01 RX ADMIN — DEXMEDETOMIDINE HYDROCHLORIDE 0.9 MCG/KG/HR: 4 INJECTION, SOLUTION INTRAVENOUS at 00:50

## 2021-01-01 RX ADMIN — ALBUMIN (HUMAN) 25 G: 0.25 INJECTION, SOLUTION INTRAVENOUS at 18:15

## 2021-01-01 RX ADMIN — LEVOFLOXACIN 750 MG: 5 INJECTION, SOLUTION INTRAVENOUS at 15:36

## 2021-01-01 RX ADMIN — SODIUM CHLORIDE 1000 ML: 9 INJECTION, SOLUTION INTRAVENOUS at 06:10

## 2021-01-01 RX ADMIN — DEXTROSE MONOHYDRATE 170 MG: 50 INJECTION, SOLUTION INTRAVENOUS at 09:12

## 2021-01-01 RX ADMIN — Medication 100 MCG/HR: at 04:15

## 2021-01-01 RX ADMIN — POLYETHYLENE GLYCOL 3350 17 G: 17 POWDER, FOR SOLUTION ORAL at 09:06

## 2021-01-01 RX ADMIN — Medication 10 ML: at 22:10

## 2021-01-01 RX ADMIN — Medication 38 MCG/MIN: at 14:33

## 2021-01-01 RX ADMIN — SENNOSIDES AND DOCUSATE SODIUM 2 TABLET: 50; 8.6 TABLET ORAL at 12:58

## 2021-01-01 RX ADMIN — LACTULOSE 20 G: 20 SOLUTION ORAL at 05:51

## 2021-01-01 RX ADMIN — PANTOPRAZOLE SODIUM 40 MG: 40 INJECTION, POWDER, FOR SOLUTION INTRAVENOUS at 09:28

## 2021-01-01 RX ADMIN — Medication 10 ML: at 09:28

## 2021-01-01 RX ADMIN — SODIUM CHLORIDE 8 MG/HR: 9 INJECTION, SOLUTION INTRAVENOUS at 08:21

## 2021-01-01 RX ADMIN — DEXMEDETOMIDINE HYDROCHLORIDE 0.8 MCG/KG/HR: 4 INJECTION, SOLUTION INTRAVENOUS at 13:24

## 2021-01-01 RX ADMIN — VASOPRESSIN 0.03 UNITS/MIN: 20 INJECTION INTRAVENOUS at 15:33

## 2021-01-01 RX ADMIN — DEXTROSE MONOHYDRATE 505 MG: 50 INJECTION, SOLUTION INTRAVENOUS at 11:47

## 2021-01-01 RX ADMIN — POTASSIUM CHLORIDE 20 MEQ: 400 INJECTION, SOLUTION INTRAVENOUS at 01:32

## 2021-01-01 RX ADMIN — IPRATROPIUM BROMIDE AND ALBUTEROL SULFATE 1 AMPULE: .5; 3 SOLUTION RESPIRATORY (INHALATION) at 12:19

## 2021-01-01 RX ADMIN — SODIUM CHLORIDE 1000 ML: 9 INJECTION, SOLUTION INTRAVENOUS at 15:53

## 2021-01-01 RX ADMIN — POTASSIUM CHLORIDE 20 MEQ: 400 INJECTION, SOLUTION INTRAVENOUS at 08:25

## 2021-01-01 RX ADMIN — IPRATROPIUM BROMIDE AND ALBUTEROL SULFATE 1 AMPULE: .5; 3 SOLUTION RESPIRATORY (INHALATION) at 20:49

## 2021-01-01 RX ADMIN — IPRATROPIUM BROMIDE AND ALBUTEROL SULFATE 1 AMPULE: .5; 3 SOLUTION RESPIRATORY (INHALATION) at 16:35

## 2021-01-01 RX ADMIN — Medication 125 MCG/HR: at 03:19

## 2021-01-01 RX ADMIN — IPRATROPIUM BROMIDE AND ALBUTEROL SULFATE 1 AMPULE: .5; 3 SOLUTION RESPIRATORY (INHALATION) at 11:47

## 2021-01-01 RX ADMIN — Medication 10 ML: at 10:01

## 2021-01-01 ASSESSMENT — PULMONARY FUNCTION TESTS
PIF_VALUE: 30
PIF_VALUE: 23
PIF_VALUE: 13
PIF_VALUE: 14
PIF_VALUE: 15
PIF_VALUE: 18
PIF_VALUE: 17
PIF_VALUE: 21
PIF_VALUE: 25
PIF_VALUE: 17
PIF_VALUE: 19
PIF_VALUE: 18
PIF_VALUE: 14
PIF_VALUE: 18
PIF_VALUE: 18
PIF_VALUE: 16
PIF_VALUE: 29
PIF_VALUE: 26
PIF_VALUE: 28
PIF_VALUE: 18
PIF_VALUE: 22
PIF_VALUE: 19
PIF_VALUE: 17
PIF_VALUE: 19
PIF_VALUE: 16
PIF_VALUE: 18
PIF_VALUE: 18
PIF_VALUE: 20
PIF_VALUE: 18
PIF_VALUE: 21
PIF_VALUE: 18
PIF_VALUE: 18
PIF_VALUE: 17
PIF_VALUE: 18
PIF_VALUE: 24
PIF_VALUE: 20
PIF_VALUE: 18
PIF_VALUE: 23
PIF_VALUE: 17
PIF_VALUE: 20
PIF_VALUE: 18
PIF_VALUE: 15
PIF_VALUE: 30
PIF_VALUE: 19
PIF_VALUE: 29
PIF_VALUE: 16
PIF_VALUE: 25
PIF_VALUE: 16
PIF_VALUE: 31
PIF_VALUE: 26
PIF_VALUE: 21
PIF_VALUE: 31
PIF_VALUE: 19
PIF_VALUE: 28
PIF_VALUE: 16
PIF_VALUE: 28
PIF_VALUE: 25
PIF_VALUE: 18
PIF_VALUE: 13
PIF_VALUE: 26
PIF_VALUE: 20
PIF_VALUE: 17
PIF_VALUE: 18
PIF_VALUE: 25
PIF_VALUE: 21
PIF_VALUE: 13

## 2021-01-01 ASSESSMENT — PAIN SCALES - GENERAL
PAINLEVEL_OUTOF10: 0
PAINLEVEL_OUTOF10: 4
PAINLEVEL_OUTOF10: 0
PAINLEVEL_OUTOF10: 5
PAINLEVEL_OUTOF10: 0

## 2021-09-10 PROBLEM — E87.6 HYPOKALEMIA: Status: ACTIVE | Noted: 2021-01-01

## 2021-09-10 PROBLEM — K92.2 GI BLEED: Status: ACTIVE | Noted: 2021-01-01

## 2021-09-10 PROBLEM — E87.29 METABOLIC ACIDOSIS, INCREASED ANION GAP (IAG): Status: ACTIVE | Noted: 2021-01-01

## 2021-09-10 PROBLEM — K76.82 HEPATIC ENCEPHALOPATHY: Status: ACTIVE | Noted: 2021-01-01

## 2021-09-10 PROBLEM — I95.9 HYPOTENSION: Status: ACTIVE | Noted: 2021-01-01

## 2021-09-10 PROBLEM — E87.1 HYPONATREMIA: Status: ACTIVE | Noted: 2021-01-01

## 2021-09-10 PROBLEM — R77.8 ELEVATED TROPONIN: Status: ACTIVE | Noted: 2021-01-01

## 2021-09-10 PROBLEM — R79.89 ELEVATED LACTIC ACID LEVEL: Status: ACTIVE | Noted: 2021-01-01

## 2021-09-10 PROBLEM — R74.01 TRANSAMINITIS: Status: ACTIVE | Noted: 2021-01-01

## 2021-09-10 PROBLEM — E72.20 HYPERAMMONEMIA (HCC): Status: ACTIVE | Noted: 2021-01-01

## 2021-09-10 PROBLEM — D64.9 ANEMIA: Status: ACTIVE | Noted: 2021-01-01

## 2021-09-10 NOTE — FLOWSHEET NOTE
Spoke at length with pt's family via . Pt was admitted here to [facility] 8 days ago under the name Geoff Wheeler. Pt gave wrong name and wrong  in fear of being reported/deported upon discharge. Pt's legal name is Joselyn Tirado and  is 1975. Pt's brother is Gladys Segura but he speaks some Lithuanian but his native language is a [address] dialect not provided by our  line. Aniceto Amaya communicated as able with the . Aniceto Amaya requested we contact Gabriele to provide updates and get permission after today's blood consent. Pt's brother-in-law is Sudhir Anthony whose native tongue is Lithuanian. Kiara Orchard, and pt live near each other and pt has lived with Juan Carlos Arias and [email or web address] wife since pt's most recent discharge. Juan Carlos Arias confirms pt's long drinking history and cirrhosis.

## 2021-09-10 NOTE — CONSULTS
Gastroenterology Consult Note        Patient: Johnnie Mike  : 1879  Acct#:      Date:  9/10/2021    Subjective:       History of Present Illness  Patient is a 81 y.o.   adult admitted with Hepatic encephalopathy ([facility] [address] [K72.90]  Gastrointestinal hemorrhage, unspecified gastrointestinal hemorrhage type [K92.2]  Thickening of wall of gallbladder [K82.8]  Liver failure with hepatic coma, unspecified chronicity ([facility] [address] [K72.91] who is seen in consult for acute anemia. History of cirrhosis, apparently history of alcohol abuse. Patient did not have any identification on him. Patient was brought to the ER for altered mental status. He was poorly responsive at home. Required intubation here and remains on the ventilator. He is hypotensive. Labs revealed WBC of 43, hemoglobin 7.6, sodium 119, creatinine 2.3, INR 4, elevated liver enzymes with bilirubin of 27. Ammonia 136. There were reports of \"hematochezia. \"  Patient was just incontinent of liquid stool which is light brown with pinkish/blake blood. OG tube lavaged with return of yellow gastric aspirate. No past medical history on file. No past surgical history on file. Past Endoscopic History  Admission Meds  No current facility-administered medications on file prior to encounter. No current outpatient medications on file prior to encounter. Allergies  Not on File   Social   Social History     Tobacco Use    Smoking status: Not on file   Substance Use Topics    Alcohol use: Not on file        No family history on file. Review of Systems  Review of systems not obtained due to patient factors. Physical Exam  Blood pressure (!) 86/55, pulse 76, temperature 93.9 °F (34.4 °C), temperature source Rectal, resp. rate 20, height 5' 5\" (1.651 m), weight 164 lb 11.2 oz (74.7 kg), SpO2 100 %.     General appearance: alert, cooperative, no distress, appears stated age  Eyes: scleral icterus  Head: Normocephalic, without obvious abnormality  Lungs: clear to auscultation bilaterally, Normal Effort  Heart: regular rate and rhythm, normal S1 and S2, no murmurs or rubs  Abdomen: distended, non-tender. Bowel sounds normal. No masses,  no organomegaly. Extremities: atraumatic, no cyanosis or edema  Skin: warm and dry, + jaundice  Neuro: sedated   Musculoskeletal: no muscle wasting      Data Review:    Recent Labs     09/09/21 2201   WBC 42.9*   HGB 7.6*   HCT 22.1*   MCV 97.7        Recent Labs     09/09/21  2201 09/10/21  0425   * 119*   K 2.9* 3.0*   CL 82* 82*   CO2 13* 14*   PHOS  --  7.0*   BUN 57* 59*   CREATININE 2.0* 2.3*     Recent Labs     09/09/21  2201 09/10/21  0425   * 464*   * 256*   BILITOT 27.9* 27.7*   ALKPHOS 312* 247*     Recent Labs     09/09/21 2201   LIPASE 166.0*     Recent Labs     09/09/21 2201   PROTIME 49.9*   INR 4.16*     No results for input(s): PTT in the last 72 hours. No results for input(s): OCCULTBLD in the last 72 hours. Imaging Studies:                CT head 9/9/21  jill bs for squad was 79 1 amp given in route patient moaning at triage )       FINDINGS:   BRAIN/VENTRICLES: There is no acute intracranial hemorrhage, mass effect or   midline shift.  No abnormal extra-axial fluid collection.  The gray-white   differentiation is maintained without evidence of an acute infarct.  There is   no evidence of hydrocephalus. Atherosclerosis.       ORBITS: The visualized portion of the orbits demonstrate no acute abnormality.       SINUSES: Patient is intubated.  Fluid in the nasopharynx.  Small amount of   scattered fluid in the paranasal sinuses.       SOFT TISSUES/SKULL:  No acute abnormality of the visualized skull or soft   tissues.           Impression   No acute intracranial abnormality. CT-scan of abdomen and pelvis wo contrast 9/10/21  Impression   1.  Hepatic cirrhosis with associated portal venous hypertension and scattered   moderate intraperitoneal free fluid. 2. Diffuse gallbladder wall thickening suggesting association with chronic   versus acute cholecystitis. 3. Suspected gallbladder lumen dependent radiodense sludge. 4. Mild cardiomegaly.                          Assessment:     Principal Problem:    Hepatic encephalopathy (HCC)  Active Problems:    Hyperammonemia (HCC)    Elevated lactic acid level    Metabolic acidosis, increased anion gap (IAG)    Elevated troponin    Hypokalemia    Hyponatremia    Hypotension    Anemia    GI bleed    Transaminitis  Resolved Problems:    * No resolved hospital problems. *    Alcoholic hepatitis, cirrhosis -recent admission for alcoholic hepatitis under different name: Wendy Hicks  64. Bilirubin 27, , , INR 4. Ammonia 136. All concerning for liver failure. Undetectable acetaminophen, salicylate, and alcohol levels. Hepatic encephalopathy -ammonia 136. Ascites -moderate per CT. On Rocephin empirically for SBP. GI bleed -does not appear to have upper GI bleed at this time as OG lavage is yellow, and no black stool on exam.  He is passing brown diarrhea with some pinkish/red blood but stool appears more brown nonbloody. Anemia  - unclear what baseline hemoglobin is. Hemoglobin currently 7.6  Leukocytosis -WBC 43. On antibiotics. Hypotension -may be more from sepsis rather than GI bleed. CHAO  Hyponatremia    Recommendations:   -Lactulose every 4 hours, Xifaxan 550 mg twice daily  -Getting vitamin K 10 mg daily x2  -Normally would order paracentesis but INR is 4. He is covered with Rocephin.  -Monitor hemoglobin  -PPI IV twice daily  -Monitor liver enzymes, INR  - check hepatitis panel  - check C.diff    Discussed with Dr. Ruth Almaraz, JOELLE  [facility]    I have personally performed a face to face diagnostic evaluation on this patient.   I have interviewed and examined the patient and I agree with the findings and recommended plan of care. In summary, my findings and plan are the following:      male brought into the ER with AMS. He was initially unnamed but later found out that he came to [facility] last time as Mariama PANDEY 64 (not his real name and ; he in undocumented). He was discharged 2021 with severe alcoholic hepatitis with TB 28. He was discharged on prednisolone. In the ER, he was found to be hypotensive and required intubation. Work up here revealed abnormal labs (WBC of 43, hemoglobin 7.6, sodium 119, creatinine 2.3, INR 4, elevated liver enzymes with bilirubin of 27,  Ammonia 136). There were reports of hematochezia. He is on pressors. Jaundiced. Abdomen obese, benign, + ascites. Stools were light brown. OG with yellow output when aspirated. A/P> Severe acute alcoholic hepatitis. Sepsis likely aspiration/SBP. Severe anemia (7.6) with no overt GI bleeding. INR >4 . IV fluids, IV PPI and octreotide, transfuse pRBCs to keep above 7, FFPs and Vit K with goal INR<2. We cannot perform a diagnostic EGD until patient is fully resuscitated. Dr. Magui Infante will be rounding over the weekend. Timing of EGD will be determined by his clinical course.      Shantal Wilkinson MD, MSc  [facility]  09/10/21

## 2021-09-10 NOTE — H&P
Hospital Medicine History & Physical      PCP: No primary care provider on file. Date of Admission: 9/9/2021    Date of Service: Pt seen/examined on 9/10/2021  and Admitted to Inpatient with expected LOS greater than two midnights due to medical therapy. Chief Complaint:    Chief Complaint   Patient presents with    Altered Mental Status     pt brought to ed by [place] [facility] after being found by family sitting in chair unresponsive but jill, bs for squad was 79 1 amp given in route patient moaning at triage         History Of Present Illness: No family at bedside to provide collateral history. History obtained from discussion with ER provider  Apparently file still being managed and has presented by different names in the past    The patient is a 81 y.o. adult with history of cirrhosis who apparently presented with family having been found by family poorly responsive who initiated CPR. EMS was called and on arrival to the ER patient was intubated. Post intubation he developed hypotension requiring fluid resuscitation with some improvement. Laboratory work-up was noted for ammonia of 136, lipase of 166, troponin of 0.10, sodium 122, potassium 2.9, CO2 of 18, creatinine of 2.0 and transaminitis with ,   CT chest abdomen pelvis revealed cirrhosis with portal hypertension and diffuse gallbladder thickening concerning for acute versus chronic cholecystitis with gallbladder sludge  CT brain with no acute intracranial pathology  EKG sinus rhythm  Urinalysis negative  He is notably jaundiced    Past Medical History:    Probable liver cirrhosis cirrhosis of the liver    Past Surgical History:    Reviewed and noncontributory    Medications Prior to Admission:    Prior to Admission medications    Attempting to get medications. Allergies: No known drug allergies    Social History:  The patient currently lives with family    TOBACCO:   has no history on file for tobacco use.   ETOH:   has no history on file for alcohol use. Family History:  Reviewed in detail and negative for DM, Early CAD, Cancer, CVA. REVIEW OF SYSTEMS: Unobtainable as intubated and sedated    PHYSICAL EXAM:    BP (!) 143/76   Pulse 80   Temp 97 °F (36.1 °C) (Oral)   Resp 17   Ht 5' 5\" (1.651 m)   Wt 160 lb (72.6 kg)   SpO2 100%   BMI 26.63 kg/m²     General appearance: Intubated and sedated on the vent  HEENT Normal cephalic, atraumatic without obvious deformity. Pupils equal, round, and reactive to light. Extra ocular muscles intact. Scleral icterus  Neck: Supple, No jugular venous distention/bruits. Lungs: Clear to auscultation, bilaterally without Rales/Wheezes/Rhonchi on the vent  Heart: Regular rate and rhythm with Normal S1/S2 without murmurs, rubs or gallops  Abdomen: Soft, non-tender or non-distended without rigidity or guarding and positive bowel sounds  Extremities: No clubbing, cyanosis. Pitting peripheral bilaterally. Skin: Skin color, texture, turgor normal.  No rashes or lesions.   Neurologic: Intubated and sedated        CBC   Recent Labs     09/09/21 2201   WBC 42.9*   HGB 7.6*   HCT 22.1*         RENAL  Recent Labs     09/09/21 2201   *   K 2.9*   CL 82*   CO2 13*   BUN 57*   CREATININE 2.0*     LFT'S  Recent Labs     09/09/21 2201   *   *   BILITOT 27.9*   ALKPHOS 312*     COAG  Recent Labs     09/09/21 2201   INR 4.16*     CARDIAC ENZYMES  Recent Labs     09/09/21 2201   TROPONINI 0.10*       U/A:    Lab Results   Component Value Date    COLORU Dark yellow 09/09/2021    WBCUA 0-2 09/09/2021    RBCUA 3-4 09/09/2021    BACTERIA 1+ 09/09/2021    CLARITYU SLCLOUDY 09/09/2021    SPECGRAV 1.020 09/09/2021    LEUKOCYTESUR Negative 09/09/2021    BLOODU TRACE-LYSED 09/09/2021    GLUCOSEU 100 09/09/2021         Active Hospital Problems    Diagnosis Date Noted    Hepatic encephalopathy ([address] [K72.90] 09/10/2021    Hyperammonemia ([facility] [E72.20] 09/10/2021    Elevated lactic acid level [R79.89] 38/43/2176    Metabolic acidosis, increased anion gap (IAG) [E87.2] 09/10/2021    Elevated troponin [R77.8] 09/10/2021    Hypokalemia [E87.6] 09/10/2021    Hyponatremia [E87.1] 09/10/2021    Hypotension [I95.9] 09/10/2021    Anemia [D64.9] 09/10/2021    GI bleed [K92.2] 09/10/2021    Transaminitis [R74.01] 09/10/2021         ASSESSMENT/PLAN:  A gentleman with unknown past history but concerning for cirrhosis and possible end-stage liver disease who lives with family and was found unresponsive this evening. She was noted to be jaundiced grossly with transaminitis, hyperammonemia, lactatemia, high gap metabolic acidosis, troponin leak with electrolyte abnormalities including hypokalemia and hyponatremia as well as anemia concerning for probable GI bleed. Patient with multiple comorbidities and multiple organ involved including the liver, renal, CNS, GI, cardiac as well as GI bleed concerning for multiorgan failure. Plan:  Admit to ICU  Continue IV hydration with NS  Coverage with ceftriaxone given GI bleed in the setting of chronic liver disease  PPI drip  We will minimize sedation  Pressor support as needed  Monitor liver function profile and electrolytes  Palliative consult to help address goals of care        DVT Prophylaxis: SCD  Diet: Intubated and sedated n.p.o. Code Status: Full code    Pt has a high probability of imminent or life-threatening deterioration requiring close monitoring, and highly complex decision-making and/or interventions of high intensity to assess, manipulate, and support his critical organ systems to prevent a likely inevitable decline which could occur if left untreated. 43 minutes of critical care time spent. Nadia Humphreys MD    Thank you No primary care provider on file. for the opportunity to be involved in this patient's care.  If you have any questions or concerns please feel free to contact me at [phone].

## 2021-09-10 NOTE — PLAN OF CARE
[facility]        Assessment & Plan of Care    History of Present Illness:   Judy  is a 81 y.o. adult who presented to the ED last night after he was found unresponsive by family, and jaundice. Patient is currently intubated and no family present so the information for the reports was gathered entirely via chart review and staff reports. History of cirrhosis and alcohol abuse. No known history of prior abdominal surgeries. Recent admission for alcoholic hepatitis under different name: Ted Cobb  1962. This is reported the wrong name and he gave this in fear of being reported/deported. According to family, his legal name is Joselyn Tirado  1975. Physical Exam:  CONSTITUTIONAL:  unarousable, no apparent distress and mildly obese  NEUROLOGIC:  Mental Status Exam:  Level of Alertness: sedated  Orientation:   ANITHA  EYES:  icteric bilaterally  ENT:  normocepalic, without obvious abnormality  NECK:  supple, symmetrical, trachea midline  LUNGS:  clear to auscultation  CARDIOVASCULAR:  regular rate and rhythm and no murmur noted  ABDOMEN: soft, distended, non-tender, involuntary guarding absent, no masses palpated, normal bowel sounds,  no scars, and hernia absent  Extremities: lower extremity edema noted  SKIN: jaundice noted    Assessment:  Hepatic encephalopathy, ammonia 136  GI bleeding, hematochezia  Alcoholic hepatic cirrhosis, portal venous hypertension  Anemia  Leukocytosis  Hypotension, shock  Hyponatremia  Acute kidney injury    Plan:  1. No plans for surgical intervention  2. GI following for history of hematochezia  3. IV hydration and electrolyte correction per nephrology  4. Antibiotics  5. PRN analgesics and antiemetics--minimizing narcotics as tolerated  6. Management of medical comorbid etiologies per primary team and consulting services  7.  Disposition: Palliative care consult pending for family support, goals of care, and code status discussion    EDUCATION:  Educated patient on plan of care and disease process--all questions answered. Plans discussed with patient and nursing. Reviewed and discussed with Dr. Dhruv Rivera consult to follow.       Signed:  CYNTHIA Connors CNP  9/10/2021 8:22 AM

## 2021-09-10 NOTE — CONSULTS
0.9 % injection 5-40 mL, 2 times per day  sodium chloride flush 0.9 % injection 5-40 mL, PRN  0.9 % sodium chloride infusion, PRN  ondansetron (ZOFRAN-ODT) disintegrating tablet 4 mg, Q8H PRN   Or  ondansetron (ZOFRAN) injection 4 mg, Q6H PRN  polyethylene glycol (GLYCOLAX) packet 17 g, Daily PRN  acetaminophen (TYLENOL) tablet 650 mg, Q6H PRN   Or  acetaminophen (TYLENOL) suppository 650 mg, Q6H PRN  ipratropium-albuterol (DUONEB) nebulizer solution 1 ampule, 4x daily  0.9 % sodium chloride infusion, Continuous  midazolam HCl (VERSED) 10 MG/2ML injection,   fentaNYL 10 mcg/mL infusion,   potassium chloride 10 mEq/100 mL IVPB (Peripheral Line), PRN  cefTRIAXone (ROCEPHIN) 2000 mg IVPB in D5W 50ml minibag, Q24H  midazolam (VERSED) 100 mg in dextrose 5 % 100 mL infusion, Continuous  sodium chloride 0.9 % 883 mL with folic acid 1 mg, adult multi-vitamin with vitamin k 10 mL, thiamine 300 mg, Daily  [START ON 9/11/2021] phytonadione (ADULT) (VITAMIN K) 10 mg in dextrose 5 % 100 mL IVPB, Daily  potassium chloride 20 mEq/50 mL IVPB (Central Line), PRN  pantoprazole (PROTONIX) injection 40 mg, BID  rifaximin (XIFAXAN) tablet 550 mg, BID  lactulose (CHRONULAC) 10 GM/15ML solution 20 g, 6 times per day  0.9 % sodium chloride infusion, PRN  norepinephrine (LEVOPHED) 16 mg in dextrose 5% 250 mL infusion, Continuous  fentaNYL 10 mcg/mL infusion, Continuous        Review of Systems:   Could not be obtained     Physical exam:     Vitals:  BP (!) 86/55   Pulse 76   Temp 93.9 °F (34.4 °C) (Rectal)   Resp 20   Ht 5' 5\" (1.651 m)   Wt 164 lb 11.2 oz (74.7 kg)   SpO2 100%   BMI 27.41 kg/m²   Constitutional:  Intubated   Skin: no rash, turgor wnl  Heent:   Icterus +  Neck: no bruits or jvd noted  Cardiovascular:  S1, S2 without m/r/g  Respiratory: CTA B without w/r/r  Abdomen:  +bs, soft, nt, nd  Ext: no  lower extremity edema  Psychiatric: mood and affect appropriate  Musculoskeletal:  Rom, muscular strength intact    Labs:  CBC:   Recent Labs     09/09/21  2201 09/10/21  0927   WBC 42.9* 32.8*   HGB 7.6* 6.0*    100*     BMP:    Recent Labs     09/09/21  2201 09/10/21  0425   * 119*   K 2.9* 3.0*   CL 82* 82*   CO2 13* 14*   BUN 57* 59*   CREATININE 2.0* 2.3*   GLUCOSE 92 153*     Ca/Mg/Phos:   Recent Labs     09/09/21  2201 09/10/21  0425   CALCIUM 7.6* 7.4*   MG 2.30 2.10   PHOS  --  7.0*     Hepatic:   Recent Labs     09/09/21  2201 09/10/21  0425   * 464*   * 256*   BILITOT 27.9* 27.7*   ALKPHOS 312* 247*     Troponin:   Recent Labs     09/09/21  2201 09/10/21  0425 09/10/21  0819   TROPONINI 0.10* 0.08* 0.09*     BNP: No results for input(s): BNP in the last 72 hours. Lipids: No results for input(s): CHOL, TRIG, HDL, LDLCALC, LABVLDL in the last 72 hours. ABGs:   Recent Labs     09/10/21  0353   PHART 7.338*   PO2ART 150.0*   [id number] 33.2*     INR:   Recent Labs     09/09/21  2201 09/10/21  0927   INR 4.16* 4.12*     UA:  Recent Labs     09/09/21  2244   COLORU Dark yellow   CLARITYU SLCLOUDY   GLUCOSEU 100*   BILIRUBINUR LARGE*   KETUA TRACE*   SPECGRAV 1.020   BLOODU TRACE-LYSED*   PHUR 5.5  5.5   PROTEINU 100*   UROBILINOGEN 0.2   NITRU Negative   LEUKOCYTESUR Negative   LABMICR YES   URINETYPE NotGiven      Urine Microscopic:   Recent Labs     09/09/21  2244   BACTERIA 1+*   COMU see below   HYALCAST 3-5*   WBCUA 0-2   RBCUA 3-4   EPIU 0-1     Urine Culture: No results for input(s): LABURIN in the last 72 hours. Urine Chemistry: No results for input(s): Jose Dom, PROTEINUR, NAUR in the last 72 hours. IMAGING:  XR CHEST PORTABLE   Preliminary Result   New central venous catheter as above, approach is unclear. CT ABDOMEN PELVIS WO CONTRAST Additional Contrast? None   Final Result   1. Hepatic cirrhosis with associated portal venous hypertension and scattered   moderate intraperitoneal free fluid.    2. Diffuse gallbladder wall thickening suggesting association with chronic   versus acute cholecystitis. 3. Suspected gallbladder lumen dependent radiodense sludge. 4. Mild cardiomegaly. CT CHEST WO CONTRAST   Final Result   1. Hepatic cirrhosis with associated portal venous hypertension and scattered   moderate intraperitoneal free fluid. 2. Diffuse gallbladder wall thickening suggesting association with chronic   versus acute cholecystitis. 3. Suspected gallbladder lumen dependent radiodense sludge. 4. Mild cardiomegaly. CT Head WO Contrast   Final Result   No acute intracranial abnormality. XR CHEST PORTABLE   Final Result   Endotracheal tube and orogastric tube are acceptable. Bibasilar opacities are again noted. XR CHEST PORTABLE   Final Result   Reticulation at the left base more than right is nonspecific. Could relate   to subsegmental atelectasis, aspiration pneumonitis, or early pneumonia. Assessment/Plan :      1. Hyponatremia   Check urine lytes   Will need to start hypertonic saline   Awaiting rpt Sodium level results     2. Hypotension/ shock state   On iv fluids   Give albumin     3. CHAO 2/2 ATN from hypotension / shock     4. HIgh anion gap acidosis   Add sodium bicarbonate to iv fluids     5.  Hypokalemia - replace     Poor prognosis             D/w primary team      Thank you for allowing us to participate in care of Johnnie Jarrett         Electronically signed by: Avani Baptiste MD, 9/10/2021, [facility]      [facility] of [address]  Office : [phone]  Fax :[phone]

## 2021-09-10 NOTE — ED PROVIDER NOTES
I independently performed a history and physical on [place] [place]. All diagnostic, treatment, and disposition decisions were made by myself in conjunction with the advanced practice provider. Briefly, this is a 81 y.o. adult here for altered mental status. Found unresponsive by his family. Family was performing CPR however on EMS arrival, he had a pulse. Unfortunately, his family was not [place] speaking and therefore EMS was unable to communicate with them. The patient is unable to give any history due to clinical condition. The family did give the EMS a bag full of medications as well as a identification document, and unfortunately the name on these is different. On exam,   General: Patient is ill appearing  Skin: No cyanosis. Jaundiced  HEENT: Moist mucous membranes  Heart: Regular rate, regular rhythm  Lung: No respiratory distress, CTABL  Abdomen: Soft, nontender, positive fluid wave  Neuro: Does not open eyes to painful stimulus, pupils 3 mm bilaterally and reactive equally, no movement to painful stimulus, moans incomprehensively, limp tone in all extremities        EKG  The Ekg interpreted by me in the absence of a cardiologist shows. Normal sinus rhythm  No acute ST changes   HR 82  No priors for comparison      Screenings            MDM  Briefly, this is a 81 y.o. adult here for altered mental status. Patient is significantly jaundiced. No increased work of breathing however GCS is below 8 therefore intubated for airway protection. Post intubation became hypotensive therefore was given lactated Ringer's while albumin was ordered. Ammonia found to be significantly elevated, started on lactulose. May be hepatic encephalopathy. Cannot rule out SBP. Started on Vanco and cefepime. Leukocytosis is significant. He is on prednisolone. With episode of hypotension, may have adrenal axis suppression therefore hydrocortisone ordered. Found to be hypo-Ani medic. Potassium ordered. No signs of trauma on my exam    --------    We were able to get in touch with the patient's cousin who he lives with. For the past 2 days, he has had altered mental status which is progressively worsening. No trauma. Has not complained of any pain. Was recently hospitalized under a different name, Eleanor Thao MRN [id number]. Apparently during this time, he had hematochezia, gallbladder wall thickening, alcohol withdrawal, liver failure. At that time, his bilirubin on discharge was 28. They did not perform paracentesis secondary to coagulopathy. Today he presents with worsening anemia with hemoglobin 7.6 from 10.5. His bilirubin appears stable. Pan scan the patient and it again shows diffuse gallbladder wall thickening but by my eye is worse than prior study 8/20. Andres Braga Has worsening leukocytosis. Will consult general surgery. Already placed on broad-spectrum antibiotics. Diffusely enlarged gallbladder wall with thickening and sludge. Elijah Malave consulted, says no surgery at this time     He has had 2 days of hematochezia with hemoglobin now 7.6 from 10.5. Starting protonix. Giving dose vit K.      Intubation    Date/Time: 9/9/2021 10:59 PM  Performed by: Roddy Nguyễn MD  Authorized by: Roddy Nguyễn MD     Consent:     Consent obtained:  Emergent situation  Pre-procedure details:     Patient status:  Unresponsive    Paralytics:  Rocuronium  Procedure details:     Preoxygenation:  Bag valve mask    CPR in progress: no      Intubation method:  Oral    Oral intubation technique:  Video-assisted    Tube size (mm):  8.0    Tube type:  Cuffed    Number of attempts:  1    Ventilation between attempts: no      Cricoid pressure: no      Tube visualized through cords: yes    Placement assessment:     ETT to lip:  24    Tube secured with:  ETT joiner    Breath sounds:  Equal    Placement verification: chest rise, condensation, CXR verification, direct visualization, equal breath sounds and ETCO2 detector CXR findings:  ETT in proper place  Post-procedure details:     Patient tolerance of procedure: Tolerated well, no immediate complications      The total critical care time spent while evaluating and treating this patient was at least 45 minutes. This excludes time spent doing separately billable procedures. This includes time at the bedside, data interpretation, medication management, obtaining critical history from collateral sources if the patient is unable to provide it directly, and physician consultation. Specifics of interventions taken and potentially life-threatening diagnostic considerations are listed above in the medical decision making. Patient Referrals:  No follow-up provider specified. Discharge Medications:  New Prescriptions    No medications on file       FINAL IMPRESSION  1. Hepatic encephalopathy ([facility] [address]    2. Gastrointestinal hemorrhage, unspecified gastrointestinal hemorrhage type    3. Thickening of wall of gallbladder    4. Liver failure with hepatic coma, unspecified chronicity (HCC)        Blood pressure (!) 151/78, pulse 81, temperature 97 °F (36.1 °C), temperature source Oral, resp. rate 16, height 5' 5\" (1.651 m), weight 160 lb (72.6 kg), SpO2 100 %. For further details of [facility] emergency department encounter, please see documentation by advanced practice provider, Mildred Avery.         Jimmy Murry MD  09/10/21 4931

## 2021-09-10 NOTE — PLAN OF CARE
Nutrition Problem #1: Inadequate oral intake  Intervention: Food and/or Nutrient Delivery: Continue NPO  Nutritional Goals: start of nutrition within next 24 - 48 hr

## 2021-09-10 NOTE — CONSULTS
[facility] Pulmonary and Critical Care   Consult Note      Reason for Consult: Hepatic encephalopathy  Requesting Physician: Delbert Kay    Subjective:   CHIEF COMPLAINT: Altered mental status     HPI: No clear information about this patient is currently available. As per report, patient was brought to the ER on account of altered mental status, apparently received CPR from family members in the field due to unresponsiveness-noted to be very lethargic and hypotensive and hence was intubated for airway protection. Patient apparently did not have any respiratory issues of note at the time of hospitalization. Hypotension requiring vasopressors. Also noted to be anemic with no clear overt signs of bleeding. Concerns for alcoholic liver disease with hepatic encephalopathy, hence transferred to ICU for close monitoring. Unfortunately no information is available at this time about patient's family or prior medical history. The patient is a 81 y.o. adult with significant past medical history of:  No past medical history on file. Past Surgical History:    No past surgical history on file.   Current Medications:    Current Facility-Administered Medications: sodium chloride flush 0.9 % injection 5-40 mL, 5-40 mL, IntraVENous, 2 times per day  sodium chloride flush 0.9 % injection 5-40 mL, 5-40 mL, IntraVENous, PRN  0.9 % sodium chloride infusion, 25 mL, IntraVENous, PRN  ondansetron (ZOFRAN-ODT) disintegrating tablet 4 mg, 4 mg, Oral, Q8H PRN **OR** ondansetron (ZOFRAN) injection 4 mg, 4 mg, IntraVENous, Q6H PRN  polyethylene glycol (GLYCOLAX) packet 17 g, 17 g, Oral, Daily PRN  acetaminophen (TYLENOL) tablet 650 mg, 650 mg, Oral, Q6H PRN **OR** acetaminophen (TYLENOL) suppository 650 mg, 650 mg, Rectal, Q6H PRN  ipratropium-albuterol (DUONEB) nebulizer solution 1 ampule, 1 ampule, Inhalation, 4x daily  midazolam HCl (VERSED) 10 MG/2ML injection, , ,   fentaNYL 10 mcg/mL infusion, , ,   potassium chloride 10 1330 (!) 87/52        09/10/21 1315 (!) 95/53        09/10/21 1300 (!) 99/50   64 12    09/10/21 1245 (!) 92/51        09/10/21 1230 (!) 84/45        09/10/21 1215 (!) 76/47        09/10/21 1200 (!) 57/40 97.8 °F (36.6 °C) Temporal 78 16    09/10/21 1145 (!) 58/39        09/10/21 1130 (!) 61/40        09/10/21 1115 (!) 61/41        09/10/21 1100 (!) 65/45        09/10/21 1045 (!) 60/42        09/10/21 1030 (!) 67/48        09/10/21 1015 (!) 65/50        09/10/21 1000 (!) 68/48   84     09/10/21 0945 (!) 58/43        09/10/21 0930 (!) 69/48        09/10/21 0915 (!) 104/59        09/10/21 0900 (!) 80/50        09/10/21 0858     20 100 %   09/10/21 0800 (!) 83/52 97.6 °F (36.4 °C) Temporal 80 18 99 %     I/O last 3 completed shifts: In: 926.6 [I.V.:472.2; NG/GT:50; IV Piggyback:404.4]  Out: 325 [Urine:325]  No intake/output data recorded.     Physical Exam:  General Appearance: Intubated and sedated, in no acute distress  Skin: warm and dry, no rash or erythema  Head: normocephalic and atraumatic  Eyes: pupils equal, round, and reactive to light, extraocular eye movements intact, conjunctivae normal  ENT: external ear and ear canal normal bilaterally, nose without deformity, nasal mucosa and turbinates normal  Neck: supple and non-tender without mass, no cervical lymphadenopathy  Pulmonary/Chest: clear to auscultation bilaterally- no wheezes, rales or rhonchi, normal air movement, no respiratory distress  Cardiovascular: normal rate, regular rhythm,  no murmurs, rubs, distal pulses intact, no carotid bruits  Abdomen: soft, non-tender, non-distended, normal bowel sounds, no masses or organomegaly  Lymph Nodes: Cervical, supraclavicular normal  Extremities: no cyanosis, clubbing or edema  Musculoskeletal: normal range of motion, no joint swelling, deformity or tenderness  Neurologic: Sedated, no focal neurologic deficits    Data patient moaning at triage   ); ORDERING SYSTEM PROVIDED HISTORY: AMS, jaundice   TECHNOLOGIST PROVIDED HISTORY:   Reason for exam:->AMS, jaundice   Additional Contrast?->None   Decision Support Exception - unselect if not a suspected or confirmed   emergency medical condition->Emergency Medical Condition (MA)   Reason for Exam: AMS, jaundice   Acuity: Acute   Type of Exam: Initial   Relevant Medical/Surgical History: Altered Mental Status (pt brought to ed by   [facility] em[facility] after being found by family sitting in chair unresponsive but   breaching, BS for squad was 79 1 amp given in route patient moaning at triage   )       FINDINGS:       Chest:       Mediastinum: The heart is normal in size and configuration.  No evidence of   pericardial effusion is seen.  No evidence of mediastinal or hilar   lymphadenopathy or mass lesion is identified.       Lungs/pleura: The lungs are well aerated without evidence of consolidation. The pleural surfaces are unremarkable without evidence of pleural thickening   or pleural effusion identified.  The heart is mildly enlarged.       Soft Tissues/Bones: The bones, skeletal muscle bundles, fascial planes and   subcutaneous soft tissues are unremarkable in appearance.           Abdomen/Pelvis:       Organs: Nodular contour of the liver is present.  Associated enlargement of   the main portal vein is seen which measures up to 15 mm in transluminal   diameter.  Diffuse gallbladder wall thickening is identified which measures   up to 7 mm.  Dependent sludge is seen within the gallbladder lumen.  The   liver, gallbladder, spleen, pancreas, adrenal glands, kidneys, are   unremarkable in appearance.       GI/Bowel: The stomach is unremarkable without wall thickening or distention.    Bowel loops are unremarkable in appearance without evidence of obstruction,   distension or mucosal thickening.       Pelvis: Urinary bladder is minimally distending in setting of Peterson catheter   placement and is grossly unremarkable in appearance.  No evidence of pelvic   free fluid is seen.       Peritoneum/Retroperitoneum: No evidence of retroperitoneal or intraperitoneal   lymphadenopathy is identified.  Scattered moderate intraperitoneal free fluid   is visualized. .       Bones/Soft Tissues: The bones, skeletal muscle bundles, fascial planes and   subcutaneous soft tissues are unremarkable in appearance.           Impression   1. Hepatic cirrhosis with associated portal venous hypertension and scattered   moderate intraperitoneal free fluid. 2. Diffuse gallbladder wall thickening suggesting association with chronic   versus acute cholecystitis. 3. Suspected gallbladder lumen dependent radiodense sludge. 4. Mild cardiomegaly. Problem List:   Hepatic encephalopathy  Acute versus chronic cholecystitis  Probable alcoholic cirrhosis with hepatitis  Hyperammonemia  Anemia ? GI bleed  Hyponatremia/CHAO  Assessment/Plan:     Patient is currently intubated for airway protection, no significant ventilator needs. Will need sedation with Versed drip due to concerns for alcohol use, though we have been unable to confirm the history. Hepatic encephalopathy/hyperammonemia. Ammonia 136, bilirubin 27, , , alk phos 247. Diffuse gallbladder wall thickening ? Acute versus chronic cholecystitis with gall bladder sludge. Will perform right upper quadrant ultrasound. Switch antibiotics to IV Zosyn empirically. Patient currently is hemodynamically unstable and coagulopathic, which needs to be addressed. General surgery consultation will be obtained for an opinion. Lactulose and rifaximin for hyperammonemia. Salicylate, acetaminophen and alcohol levels negative. Urine tox screen was negative. Anemia with hemoglobin of 7.6> 6.0. No overt signs of GI bleed.   Will need EGD at some point, correct coagulopathy first.  Continue with Protonix drip.  2 units of PRBC and 4 units of FFP will be administered. Patient already received vitamin K 10 mg IV. INR four, will need recheck after blood product transfusion. Hypotension concerning for septic +/-hemorrhagic shock, lactate 4.7. Continue with IV fluid resuscitation. Source of sepsis could be cholecystitis or SBP. Switch Rocephin to Zosyn. COVID-19 PCR negative. Mild troponin elevation possibly related to demand ischemia, no acute EKG changes. Hyponatremia/CHAO, possibly from volume depletion. Check urine/serum osmolarity and urine sodium. Creatinine 2.3. Severe acidosis with bicarbonate of 14. Started on IV sodium bicarbonate drip by nephrology. Monitor urine output closely. Hold tube feeds.     Sixto Javier MD    Critical care time 39 Min excluding procedures

## 2021-09-10 NOTE — ED PROVIDER NOTES
(*)     All other components within normal limits    Narrative:     Vy ORTEGA tel. [phone],  Chemistry results called to and read back by Jarrett Garcia, 09/09/2021 22:41,  by [facility]  Performed at:  [facility] [address], [facility], [address]   Phone [phone]   LIPASE - Abnormal; Notable for the following components:    Lipase 166.0 (*)     All other components within normal limits    Narrative:     Vy ORTEGA tel. [phone],  Chemistry results called to and read back by Jarrett Garcia, 09/09/2021 22:41,  by [facility]  Performed at:  [facility] [address], [facility], [address]   Phone [phone]   PROTIME-INR - Abnormal; Notable for the following components:    Protime 49.9 (*)     INR 4.16 (*)     All other components within normal limits    Narrative:     Performed at:  [facility] [address], [facility], [address]   Phone [phone]   AMMONIA - Abnormal; Notable for the following components:    Ammonia 136 (*)     All other components within normal limits    Narrative:     Vy ORTEGA tel. [phone],  Chemistry results called to and read back by Jarrett Garcia, 09/09/2021 22:41,  by [facility]  Performed at:  [facility] [address], [facility], [address]   Phone [phone]   LACTATE, SEPSIS - Abnormal; Notable for the following components:    Lactic Acid, Sepsis 8.5 (*)     All other components within normal limits    Narrative:     Aurea Rivera tel. [phone],  Chemistry results called to and read back by Jarrett Garcia, 09/09/2021 22:41,  by [facility]  Performed at:  [facility] [address], [facility], [address]   Phone [phone]   URINE RT REFLEX TO CULTURE - Abnormal; Notable for the following components:    Glucose, Ur 100 (*)     Bilirubin Urine LARGE (*)     Ketones, Urine TRACE (*) Blood, Urine TRACE-LYSED (*)     Protein,  (*)     All other components within normal limits    Narrative:     Performed at:  [facility] [address], [place], [facility] [place]   Phone [phone]   ACETAMINOPHEN LEVEL - Abnormal; Notable for the following components:    Acetaminophen Level <5 (*)     All other components within normal limits    Narrative:     María Elena Sherwood  [facility] tel. [phone],  Chemistry results called to and read back by Young Spear, 09/09/2021 22:41,  by Shawna Jordan  Performed at:  [facility] [address], [place], [facility] [place]   Phone [phone]   SALICYLATE LEVEL - Abnormal; Notable for the following components:    Salicylate, Serum <4.3 (*)     All other components within normal limits    Narrative:     [facility] [phone],  Chemistry results called to and read back by Young Spear, 09/09/2021 22:41,  by Shawna Jordan  Performed at:  [facility] [address], [place], [address]   Phone [phone]   BLOOD GAS, VENOUS - Abnormal; Notable for the following components:    pH, Beck 7.298 (*)     pCO2, Beck 29.2 (*)     pO2, Beck 94.0 (*)     HCO3, Venous 14.3 (*)     Base Excess, Beck -11.1 (*)     Carboxyhemoglobin 3.6 (*)     All other components within normal limits    Narrative:     Performed at:  [facility] [address], [place], [facility] [place]   Phone [phone]   MICROSCOPIC URINALYSIS - Abnormal; Notable for the following components:    Hyaline Casts, UA 3-5 (*)     Cellular Cast, UA 1-3 Epith.  (*)     Bacteria, UA 1+ (*)     All other components within normal limits    Narrative:     Performed at:  [facility] [address], [place], [facility]   Phone [phone]   CULTURE, BLOOD 1   CULTURE, BLOOD 2   URINE DRUG SCREEN    Narrative:     Performed at:  [facility]  [address] [facility],  [place], [address]   Phone [phone]   ETHANOL    Narrative:     Nimisha Boyle  [facility] tel. [phone],  Chemistry results called to and read back by Shanda Shelton, 09/09/2021 22:41,  by [address]  Performed at:  [facility]  [address] [facility],  [place], [address]   Phone [phone]   MAGNESIUM    Narrative:     Nimisha Boyle  [facility] tel. [phone],  Chemistry results called to and read back by Shanda Shelton, 09/09/2021 22:41,  by [address]  Performed at:  [facility]  [address] [facility],  [place], [address]   Phone [phone]   LACTATE, SEPSIS   COVID-19       When ordered only abnormal lab results are displayed. All other labs were within normal range or not returned as of this dictation. EKG: When ordered, EKG's are interpreted by the Emergency Department Physician in the absence of a cardiologist.  Please see their note for interpretation of EKG. RADIOLOGY:   Non-plain film images such as CT, Ultrasound and MRI are read by the radiologist. Plain radiographic images are visualized and preliminarily interpreted by the ED Provider with the below findings:        Interpretation per the Radiologist below, if available at the time of this note:    CT ABDOMEN PELVIS WO CONTRAST Additional Contrast? None   Final Result   1. Hepatic cirrhosis with associated portal venous hypertension and scattered   moderate intraperitoneal free fluid. 2. Diffuse gallbladder wall thickening suggesting association with chronic   versus acute cholecystitis. 3. Suspected gallbladder lumen dependent radiodense sludge. 4. Mild cardiomegaly. CT CHEST WO CONTRAST   Final Result   1. Hepatic cirrhosis with associated portal venous hypertension and scattered   moderate intraperitoneal free fluid. 2. Diffuse gallbladder wall thickening suggesting association with chronic   versus acute cholecystitis.    3. Suspected gallbladder lumen dependent radiodense sludge. 4. Mild cardiomegaly. CT Head WO Contrast   Final Result   No acute intracranial abnormality. XR CHEST PORTABLE   Final Result   Endotracheal tube and orogastric tube are acceptable. Bibasilar opacities are again noted. XR CHEST PORTABLE   Final Result   Reticulation at the left base more than right is nonspecific. Could relate   to subsegmental atelectasis, aspiration pneumonitis, or early pneumonia.              PROCEDURES   Unless otherwise noted below, none     Procedures    CRITICAL CARE TIME   N/A    CONSULTS:  IP CONSULT TO GENERAL SURGERY  IP CONSULT TO HOSPITALIST  IP CONSULT TO CRITICAL CARE      EMERGENCY DEPARTMENT COURSE and DIFFERENTIAL DIAGNOSIS/MDM:   Vitals:    Vitals:    09/10/21 0045 09/10/21 0100 09/10/21 0115 09/10/21 0130   BP: (!) 152/71 (!) 149/71 (!) 149/78 (!) 143/76   Pulse: 78 78 79 80   Resp: 15 16 18 17   Temp:       TempSrc:       SpO2: 100% 100% 100% 100%   Weight:       Height:           Patient was given the following medications:  Medications   sodium chloride flush 0.9 % injection 5-40 mL (has no administration in time range)   sodium chloride flush 0.9 % injection 5-40 mL (has no administration in time range)   0.9 % sodium chloride infusion (has no administration in time range)   vancomycin (VANCOCIN) 1,500 mg in dextrose 5 % 250 mL IVPB (1,500 mg IntraVENous New Bag 9/10/21 0132)   fentaNYL 10 mcg/mL infusion (25 mcg/hr IntraVENous New Bag 9/10/21 0035)   fentaNYL (SUBLIMAZE) injection 25 mcg (has no administration in time range)   phytonadione (ADULT) (VITAMIN K) 10 mg in dextrose 5 % 100 mL IVPB (has no administration in time range)   pantoprazole (PROTONIX) injection 80 mg (has no administration in time range)   cefepime (MAXIPIME) 2000 mg IVPB minibag (0 mg IntraVENous Stopped 9/10/21 0132)   hydrocortisone sodium succinate PF (SOLU-CORTEF) injection 50 mg (50 mg IntraVENous Given 9/9/21 2310)   lactated ringers infusion (0 mLs IntraVENous Stopped 9/9/21 2349)   lactated ringers infusion 1,000 mL (0 mLs IntraVENous Stopped 9/9/21 2305)   etomidate (AMIDATE) injection 20 mg (20 mg IntraVENous Given 9/9/21 2228)   albumin human 25 % IV solution 50 g (0 g IntraVENous Stopped 9/10/21 0035)   potassium chloride 10 mEq/100 mL IVPB (Peripheral Line) (10 mEq IntraVENous New Bag 9/10/21 0052)   lactulose (CHRONULAC) 10 GM/15ML solution 40 g (40 g Oral Given 9/10/21 0025)   rocuronium (ZEMURON) injection 100 mg (100 mg IntraVENous Given 9/9/21 2228)           I was called immediately in this patient's room. He was somnolent. Slightly open his eyes but spoke nonsensically with verbal stimuli. Unable to really get any history as patient does not speak [place] and has no family at this time. Patient has jaundice and scleral icterus. Immediately got attending and patient was intubated. Was found to have significant leukocytosis along with anemia, hypokalemia. Family later presents and states that he has been having some hematochezia he has had multiple point drop in his hemoglobin. Ammonia is 136. Lactic acid of 8.5. Was given lactulose, IV fluids started on PPI, Solu-Cortef, antibiotics, albumin. Will be admitted for further work-up and treatment. Attending also discussed this with general surgery given thickened gallbladder. FINAL IMPRESSION      1. Hepatic encephalopathy ([address]    2. Gastrointestinal hemorrhage, unspecified gastrointestinal hemorrhage type    3. Thickening of wall of gallbladder    4. Liver failure with hepatic coma, unspecified chronicity ([address]          DISPOSITION/PLAN   DISPOSITION Admitted 09/10/2021 01:39:16 AM      PATIENT REFERRED TO:  No follow-up provider specified.     DISCHARGE MEDICATIONS:  New Prescriptions    No medications on file       DISCONTINUED MEDICATIONS:  Discontinued Medications    No medications on file              (Please note that portions of this note were completed with a

## 2021-09-10 NOTE — CONSULTS
[facility]     Consult                                                     Patient Name: Rosa Zhang  MRN: [id number]  YOB: 1879  Admission date: 9/9/2021  9:42 PM   Date of evaluation: 9/10/2021  Primary Care Physician: No primary care provider on file. Requesting physician: Stuart Bernard MD  Reason for consult: Abdominal pain  History of Present Illness:      Allie Fraire is a 81 y.o. adult who presents to ED unresponsive and not able to provide history. Identity is still being clarified and as such name, age, and accurate history is difficult to verity for now. Information for this document has been supplemented with chart review and discussion with staff. Intubated on arrival, profoundly jaundice, hypotensive and heavy history of alcohol abuse, cirrhosis. Inflammation around gallbladder prompted surgical consultation    Past Medical History:    No past medical history on file. Past Surgical History:    No past surgical history on file.     Scheduled Meds:   sodium chloride flush  5-40 mL IntraVENous 2 times per day    ipratropium-albuterol  1 ampule Inhalation 4x daily    midazolam HCl        fentaNYL        cefTRIAXone (ROCEPHIN) IV  2,000 mg IntraVENous V96K    folic acid, thiamine, multi-vitamin with vitamin K infusion   IntraVENous Daily    [START ON 9/11/2021] phytonadione (VITAMIN K)  IVPB  10 mg IntraVENous Daily    rifaximin  550 mg Per NG tube BID    lactulose  20 g Per NG tube 6 times per day    albumin human  25 g IntraVENous Q6H    sodium chloride  1,000 mL IntraVENous Once     Continuous Infusions:   sodium chloride      midazolam 1 mg/hr (09/10/21 1215)    sodium chloride      norepinephrine 5 mcg/min (09/10/21 1157)    IV infusion builder 75 mL/hr at 09/10/21 1212    sodium chloride      sodium chloride      sodium chloride      pantoprozole (PROTONIX) infusion      fentaNYL 100 mcg/hr (09/10/21 1215)     PRN Meds:.sodium chloride flush, sodium chloride, ondansetron **OR** ondansetron, polyethylene glycol, acetaminophen **OR** acetaminophen, potassium chloride, potassium chloride, sodium chloride, sodium chloride, sodium chloride, sodium chloride    Prior to Admission medications    Not on File        Allergies:  Patient has no allergy information on record. Social History:   Social History     Socioeconomic History    Marital status: Single     Spouse name: Not on file    Number of children: Not on file    Years of education: Not on file    Highest education level: Not on file   Occupational History    Not on file   Tobacco Use    Smoking status: Not on file   Substance and Sexual Activity    Alcohol use: Not on file    Drug use: Not on file    Sexual activity: Not on file   Other Topics Concern    Not on file   Social History Narrative    Not on file     Social Determinants of Health     Financial Resource Strain:     Difficulty of Paying Living Expenses:    Food Insecurity:     Worried About Running Out of Food in the Last Year:     [address] in the Last Year:    Transportation Needs:     Lack of Transportation (Medical):  Lack of Transportation (Non-Medical):    Physical Activity:     Days of Exercise per Week:     Minutes of Exercise per Session:    Stress:     Feeling of Stress :    Social Connections:     Frequency of Communication with Friends and Family:     Frequency of Social Gatherings with Friends and Family:     Attends Mu-ism Services:     Active Member of Clubs or Organizations:     Attends Club or Organization Meetings:     Marital Status:    Intimate Partner Violence:     Fear of Current or Ex-Partner:     Emotionally Abused:     Physically Abused:     Sexually Abused:        Family History:    No family history on file.     Review of Systems: unable to review secondary to mental status    Vital Signs:  Patient Vitals for the past 24 hrs:   BP Temp Temp src Pulse Resp SpO2 Height Weight   09/10/21 1415 (!) 90/49          09/10/21 1400 (!) 86/48   64 15      09/10/21 1345 (!) 87/50          09/10/21 1330 (!) 87/52          09/10/21 1315 (!) 95/53          09/10/21 1300 (!) 99/50   64 12      09/10/21 1245 (!) 92/51          09/10/21 1230 (!) 84/45          09/10/21 1215 (!) 76/47          09/10/21 1200 (!) 57/40 97.8 °F (36.6 °C) Temporal 78 16      09/10/21 1145 (!) 58/39          09/10/21 1130 (!) 61/40          09/10/21 1115 (!) 61/41          09/10/21 1100 (!) 65/45          09/10/21 1045 (!) 60/42          09/10/21 1030 (!) 67/48          09/10/21 1015 (!) 65/50          09/10/21 1000 (!) 68/48   84       09/10/21 0945 (!) 58/43  Sammie.Cease        09/10/21 0930 (!) 69/48  Easter Buitrago      09/10/21 0915 (!) 104/59          09/10/21 0900 (!) 80/50          09/10/21 0858     20 100 %     09/10/21 0800 (!) 83/52 97.6 °F (36.4 °C) Temporal 80 18 99 %     09/10/21 0700 (!) 86/55   76 18 100 %     09/10/21 0600 (!) 92/57   70 15 100 %     09/10/21 0500 103/67   71 16 100 %     09/10/21 0400 94/62   72 18 97 %     09/10/21 0342    78 20 96 %     09/10/21 0340 106/66 93.9 °F (34.4 °C) Rectal 75 17 96 %  164 lb 11.2 oz (74.7 kg)   09/10/21 0300 124/77   79 16 100 %     09/10/21 0245 133/80   80 18 100 %     09/10/21 0230 137/76   82 21 100 %     09/10/21 0215 (!) 152/82   81 19 100 %     09/10/21 0200 (!) 148/76   79 17 100 %     09/10/21 0145 (!) 146/72   80 21 100 %     09/10/21 0130 (!) 143/76   80 17 100 %     09/10/21 0115 (!) 149/78   79 18 100 %     09/10/21 0100 (!) 149/71   78 16 100 %     09/10/21 0045 (!) 152/71   78 15 100 %     09/10/21 0030 (!) 151/78   81 16 100 %     09/10/21 0028    86 14 98 %     09/10/21 0000 (!) 141/80   82 17 100 %     09/09/21 2345 (!) 151/76   87 15 100 %     21 2330 135/81   84 21 100 %     21 2315 130/79   86 21 100 %     21 2300 133/79   87 22 100 %     21 2245 98/60   84 19 100 %     21 2241 (!) 90/56   86 20 100 %     21 2239 (!) 75/52   87 14 100 %     21 2237 (!) 69/50   87 14 100 %     21 2236 (!) 70/46   86 14 100 %     21 2233 (!) 68/46   90 16 100 %     21 2230 (!) 81/56   96 19 100 %     21 2220 91/65   78 13 100 %     21 2215 91/64   77 13 100 %     21 2200 93/65   77 12 100 %     21 2145 100/68   79 12 100 %     21 2144 105/69 97 °F (36.1 °C) Oral 82 15 100 % 5' 5\" (1.651 m) 160 lb (72.6 kg)      TEMPERATURE HISTORY 24H: Temp (24hrs), Av.6 °F (35.9 °C), Min:93.9 °F (34.4 °C), Max:97.8 °F (36.6 °C)    BLOOD PRESSURE HISTORY: Systolic (13MTZ), DJE:65 , Min:57 , TTO:459    Diastolic (22IDJ), FDA:03, Min:39, Max:82    Admission Weight: 160 lb (72.6 kg)       Intake/Output Summary (Last 24 hours) at 9/10/2021 1525  Last data filed at 9/10/2021 1215  Gross per 24 hour   Intake 926.55 ml   Output 325 ml   Net 601.55 ml     Drain/tube Output:         Physical Exam:  Constitutional:  well-developed, well-nourished,   Neurologic: sedated, vent, does not follow commands  Psychiatric: unable to assess  Eyes:  icteric bilaterally, no visible discharge  Head, ears, nose, mouth, throat: normocepalic, without obvious abnormality, supple, symmetrical, trachea midline, no JVD, mucous membranes moist  Cardiovascular: tachycardic with regular rhythm   Respiratory: clear to auscultation  GI: soft, non-distended, unable to assess tenderness  Lymph: no palpable lymphadenopathy  Skin: jaundice noted    Labs:    CBC:    Recent Labs     09/09/21  2201 09/10/21  0927   WBC 42.9* 32.8*   HGB 7.6* 6.0*   HCT 22.1* 17.0*    100*     BMP:    Recent Labs     09/09/21  2201 09/10/21  0425 09/10/21  1400   * 119* 128*   K 2.9* 3.0*  --    CL 82* 82*  --    CO2 13* 14*  --    BUN 57* 59*  --    CREATININE 2.0* 2.3*  --    GLUCOSE 92 153*  --      Hepatic:   Recent Labs     09/09/21  2201 09/10/21  0425   * 464*   * 256*   BILITOT 27.9* 27.7*   ALKPHOS 312* 247*     Amylase: No results for input(s): AMYLASE in the last 72 hours. Lipase:   Recent Labs     09/09/21 2201   LIPASE 166.0*     Mag:      Recent Labs     09/09/21  2201 09/10/21  0425   MG 2.30 2.10      Phos:     Recent Labs     09/10/21  0425   PHOS 7.0*      Coags:   Recent Labs     09/09/21  2201 09/10/21  0927   INR 4.16* 4.12*       Imaging:  I have personally reviewed the following films:  CT ABDOMEN PELVIS WO CONTRAST Additional Contrast? None    Result Date: 9/10/2021  EXAMINATION: CT OF THE CHEST WITHOUT CONTRAST; CT OF THE ABDOMEN AND PELVIS WITHOUT CONTRAST 9/10/2021 12:06 am; 9/10/2021 12:07 am TECHNIQUE: CT of the chest was performed without the administration of intravenous contrast. Multiplanar reformatted images are provided for review. Dose modulation, iterative reconstruction, and/or weight based adjustment of the mA/kV was utilized to reduce the radiation dose to as low as reasonably achievable.; CT of the abdomen and pelvis was performed without the administration of intravenous contrast. Multiplanar reformatted images are provided for review. Dose modulation, iterative reconstruction, and/or weight based adjustment of the mA/kV was utilized to reduce the radiation dose to as low as reasonably achievable. COMPARISON: None. HISTORY: ORDERING SYSTEM PROVIDED HISTORY: AMS TECHNOLOGIST PROVIDED HISTORY: Reason for exam:->AMS Decision Support Exception - unselect if not a suspected or confirmed emergency medical condition->Emergency Medical Condition (MA) Reason for Exam: AMS Acuity: Acute Type of Exam: Initial Relevant Medical/Surgical History:  Altered Mental Status (pt brought to ed by [facility] after being found by family sitting in chair unresponsive but breaching, BS for squad was 79 1 amp given in route patient moaning at triage ); ORDERING SYSTEM PROVIDED HISTORY: AMS, jaundice TECHNOLOGIST PROVIDED HISTORY: Reason for exam:->AMS, jaundice Additional Contrast?->None Decision Support Exception - unselect if not a suspected or confirmed emergency medical condition->Emergency Medical Condition (MA) Reason for Exam: AMS, jaundice Acuity: Acute Type of Exam: Initial Relevant Medical/Surgical History: Altered Mental Status (pt brought to ed by [place] [facility] after being found by family sitting in chair unresponsive but breaching, BS for squad was 79 1 amp given in route patient moaning at triage ) FINDINGS: Chest: Mediastinum: The heart is normal in size and configuration. No evidence of pericardial effusion is seen. No evidence of mediastinal or hilar lymphadenopathy or mass lesion is identified. Lungs/pleura: The lungs are well aerated without evidence of consolidation. The pleural surfaces are unremarkable without evidence of pleural thickening or pleural effusion identified. The heart is mildly enlarged. Soft Tissues/Bones: The bones, skeletal muscle bundles, fascial planes and subcutaneous soft tissues are unremarkable in appearance. Abdomen/Pelvis: Organs: Nodular contour of the liver is present. Associated enlargement of the main portal vein is seen which measures up to 15 mm in transluminal diameter. Diffuse gallbladder wall thickening is identified which measures up to 7 mm. Dependent sludge is seen within the gallbladder lumen. The liver, gallbladder, spleen, pancreas, adrenal glands, kidneys, are unremarkable in appearance. GI/Bowel: The stomach is unremarkable without wall thickening or distention. Bowel loops are unremarkable in appearance without evidence of obstruction, distension or mucosal thickening.  Pelvis: Urinary bladder is minimally distending in setting of Peterson catheter placement and is grossly unremarkable in appearance. No evidence of pelvic free fluid is seen. Peritoneum/Retroperitoneum: No evidence of retroperitoneal or intraperitoneal lymphadenopathy is identified. Scattered moderate intraperitoneal free fluid is visualized. . Bones/Soft Tissues: The bones, skeletal muscle bundles, fascial planes and subcutaneous soft tissues are unremarkable in appearance. 1. Hepatic cirrhosis with associated portal venous hypertension and scattered moderate intraperitoneal free fluid. 2. Diffuse gallbladder wall thickening suggesting association with chronic versus acute cholecystitis. 3. Suspected gallbladder lumen dependent radiodense sludge. 4. Mild cardiomegaly. CT Head WO Contrast    Result Date: 9/10/2021  EXAMINATION: CT OF THE HEAD WITHOUT CONTRAST  9/10/2021 12:05 am TECHNIQUE: CT of the head was performed without the administration of intravenous contrast. Dose modulation, iterative reconstruction, and/or weight based adjustment of the mA/kV was utilized to reduce the radiation dose to as low as reasonably achievable. COMPARISON: None. HISTORY: ORDERING SYSTEM PROVIDED HISTORY: Altered mental status TECHNOLOGIST PROVIDED HISTORY: Reason for exam:->Altered mental status Has a \"code stroke\" or \"stroke alert\" been called? ->No Decision Support Exception - unselect if not a suspected or confirmed emergency medical condition->Emergency Medical Condition (MA) Reason for Exam: Altered mental status Acuity: Acute Type of Exam: Initial Relevant Medical/Surgical History: Altered Mental Status (pt brought to ed by [facility] after being found by family sitting in chair unresponsive but breahing, bs for squad was 79 1 amp given in route patient moaning at triage ) FINDINGS: BRAIN/VENTRICLES: There is no acute intracranial hemorrhage, mass effect or midline shift. No abnormal extra-axial fluid collection. The gray-white differentiation is maintained without evidence of an acute infarct.   There is no evidence of hydrocephalus. Atherosclerosis. ORBITS: The visualized portion of the orbits demonstrate no acute abnormality. SINUSES: Patient is intubated. Fluid in the nasopharynx. Small amount of scattered fluid in the paranasal sinuses. SOFT TISSUES/SKULL:  No acute abnormality of the visualized skull or soft tissues. No acute intracranial abnormality. CT CHEST WO CONTRAST    Result Date: 9/10/2021  EXAMINATION: CT OF THE CHEST WITHOUT CONTRAST; CT OF THE ABDOMEN AND PELVIS WITHOUT CONTRAST 9/10/2021 12:06 am; 9/10/2021 12:07 am TECHNIQUE: CT of the chest was performed without the administration of intravenous contrast. Multiplanar reformatted images are provided for review. Dose modulation, iterative reconstruction, and/or weight based adjustment of the mA/kV was utilized to reduce the radiation dose to as low as reasonably achievable.; CT of the abdomen and pelvis was performed without the administration of intravenous contrast. Multiplanar reformatted images are provided for review. Dose modulation, iterative reconstruction, and/or weight based adjustment of the mA/kV was utilized to reduce the radiation dose to as low as reasonably achievable. COMPARISON: None. HISTORY: ORDERING SYSTEM PROVIDED HISTORY: AMS TECHNOLOGIST PROVIDED HISTORY: Reason for exam:->AMS Decision Support Exception - unselect if not a suspected or confirmed emergency medical condition->Emergency Medical Condition (MA) Reason for Exam: AMS Acuity: Acute Type of Exam: Initial Relevant Medical/Surgical History:  Altered Mental Status (pt brought to ed by [facility] after being found by family sitting in chair unresponsive but breaching, BS for squad was 79 1 amp given in route patient moaning at triage ); ORDERING SYSTEM PROVIDED HISTORY: AMS, jaundice TECHNOLOGIST PROVIDED HISTORY: Reason for exam:->AMS, jaundice Additional Contrast?->None Decision Support Exception - unselect if not a suspected or confirmed emergency medical condition->Emergency Medical Condition (MA) Reason for Exam: AMS, jaundice Acuity: Acute Type of Exam: Initial Relevant Medical/Surgical History: Altered Mental Status (pt brought to ed by [place] [facility] after being found by family sitting in chair unresponsive but breaching, BS for squad was 79 1 amp given in route patient moaning at triage ) FINDINGS: Chest: Mediastinum: The heart is normal in size and configuration. No evidence of pericardial effusion is seen. No evidence of mediastinal or hilar lymphadenopathy or mass lesion is identified. Lungs/pleura: The lungs are well aerated without evidence of consolidation. The pleural surfaces are unremarkable without evidence of pleural thickening or pleural effusion identified. The heart is mildly enlarged. Soft Tissues/Bones: The bones, skeletal muscle bundles, fascial planes and subcutaneous soft tissues are unremarkable in appearance. Abdomen/Pelvis: Organs: Nodular contour of the liver is present. Associated enlargement of the main portal vein is seen which measures up to 15 mm in transluminal diameter. Diffuse gallbladder wall thickening is identified which measures up to 7 mm. Dependent sludge is seen within the gallbladder lumen. The liver, gallbladder, spleen, pancreas, adrenal glands, kidneys, are unremarkable in appearance. GI/Bowel: The stomach is unremarkable without wall thickening or distention. Bowel loops are unremarkable in appearance without evidence of obstruction, distension or mucosal thickening. Pelvis: Urinary bladder is minimally distending in setting of Peterson catheter placement and is grossly unremarkable in appearance. No evidence of pelvic free fluid is seen. Peritoneum/Retroperitoneum: No evidence of retroperitoneal or intraperitoneal lymphadenopathy is identified. Scattered moderate intraperitoneal free fluid is visualized. . Bones/Soft Tissues:  The bones, skeletal muscle bundles, fascial planes and subcutaneous soft tissues are CHEST 9/9/2021 10:00 pm COMPARISON: None with this ID. HISTORY: ORDERING SYSTEM PROVIDED HISTORY: Altered mental status TECHNOLOGIST PROVIDED HISTORY: Reason for exam:->Altered mental status FINDINGS: Reticulation at the left base more than the right. The upper lungs are clear. No pleural effusion or pneumothorax. Cardiac silhouette is not enlarged and no pulmonary vascular congestion. No acute fractures are identified. No lines or tubes within the chest.     Reticulation at the left base more than right is nonspecific. Could relate to subsegmental atelectasis, aspiration pneumonitis, or early pneumonia. Cultures:  Relevant cultures documented under results section     Assessment:  Patient Active Problem List   Diagnosis    Hepatic encephalopathy ([facility] [address]    Hyperammonemia (HCC)    Elevated lactic acid level    Metabolic acidosis, increased anion gap (IAG)    Elevated troponin    Hypokalemia    Hyponatremia    Hypotension    Anemia    GI bleed    Transaminitis     Hepatic encephalopathy, ammonia 136  GI bleeding, hematochezia  Alcoholic hepatic cirrhosis, portal venous hypertension  Anemia  Leukocytosis  Hypotension, shock  Hyponatremia  Acute kidney injury    Plan:  1. Gallbladder is inflamed on imaging secondary to hepatic inflammation. Do not recommend cholecystectomy or cholecystostomy. Risks far outweigh benefits and gallbladder not causing current clinical picture  2. Monitor acute blood loss anemia, trend hemoglobin, platelets and INR, transfuse as needed, GI following to assess for need for endoscopy, variceal bleeding   3. Antibiotics  4. IVF, wean pressors as able  5. PPI  6. Prognosis is grim given multitude and severity of medical comorbidities  7. Defer management of remainder of medical comorbidities to primary and consulting teams. No acute general surgery issues.  Will follow peripherally, please call as needed    Thank you for the consult and allowing me to participate in the care of this patient    Rolan Hernandez.  Agnes Garcia MD, FACS  9/10/2021  3:25 PM

## 2021-09-10 NOTE — PROGRESS NOTES
Pt admitted to [place] [address] ICU from ER for hepatic encephalopathy. Pt sedated on ventilator. Full assessment completed see flow records for details.

## 2021-09-10 NOTE — ED NOTES
Report called to [facility], Rn verbalized understanding and denied any need for further information, patient placed on lifepak and transported to unit, fentanyl  infusing at time of transport      Jaimee Zabala RN  09/10/21 1749 RUFINA Jordan RN  09/10/21 [id number]

## 2021-09-11 NOTE — PROGRESS NOTES
2 units of FFP's administered at this time. Vital signs and patient monitored closely. No suspected reaction.

## 2021-09-11 NOTE — PROGRESS NOTES
Physician Progress Note      Lisa Dozier  CSN #:                  [id number]  :                       1879  ADMIT DATE:       2021 9:42 PM  [address] [place] DATE:  RESPONDING  PROVIDER #:        Phuong Nicole MD          QUERY TEXT:    Pt admitted with GI Bleed. Pt noted to have Low H&H. If possible, please   document in the progress notes and/or discharge summary if you are evaluating   and/or treating any of the following: The medical record reflects the following:  Risk Factors: Per GI consult: GI Bleeding, hematochezia  Clinical Indicators: H&H on admit 7.6/22.1, H&H now 6.0/17.0  Treatment: Transfusions and monitoring of repeated labs  Options provided:  -- Acute blood loss anemia  -- Chronic blood loss anemia  -- Acute on chronic blood loss anemia  -- Other - I will add my own diagnosis  -- Disagree - Not applicable / Not valid  -- Disagree - Clinically unable to determine / Unknown  -- Refer to Clinical Documentation Reviewer    PROVIDER RESPONSE TEXT:    This patient has acute blood loss anemia.     Query created by: Juan Paniagua on 9/10/2021 4:27 PM      Electronically signed by:  Phuong Nicole MD 9/10/2021 10:49 PM

## 2021-09-11 NOTE — PLAN OF CARE
Problem: Non-Violent Restraints  Goal: Removal from restraints as soon as assessed to be safe  Outcome: Ongoing  Goal: No harm/injury to patient while restraints in use  Outcome: Ongoing  Goal: Patient's dignity will be maintained  Outcome: Ongoing     Problem: Nutrition  Goal: Optimal nutrition therapy  9/10/2021 2309 by Rigo Hoskins RN  Outcome: Ongoing  9/10/2021 0920 by Ghanshyam Madrid RD, LD  Outcome: Ongoing     Problem: Skin Integrity:  Goal: Will show no infection signs and symptoms  Description: Will show no infection signs and symptoms  Outcome: Ongoing  Goal: Absence of new skin breakdown  Description: Absence of new skin breakdown  Outcome: Ongoing     Problem: Falls - Risk of:  Goal: Will remain free from falls  Description: Will remain free from falls  Outcome: Ongoing  Goal: Absence of physical injury  Description: Absence of physical injury  Outcome: Ongoing     Problem: Infection:  Goal: Will remain free from infection  Description: Will remain free from infection  Outcome: Ongoing     Problem: Safety:  Goal: Free from accidental physical injury  Description: Free from accidental physical injury  Outcome: Ongoing  Goal: Free from intentional harm  Description: Free from intentional harm  Outcome: Ongoing     Problem: Daily Care:  Goal: Daily care needs are met  Description: Daily care needs are met  Outcome: Ongoing     Problem: Pain:  Goal: Patient's pain/discomfort is manageable  Description: Patient's pain/discomfort is manageable  Outcome: Ongoing     Problem: Skin Integrity:  Goal: Skin integrity will stabilize  Description: Skin integrity will stabilize  Outcome: Ongoing     Problem: Discharge Planning:  Goal: Patients continuum of care needs are met  Description: Patients continuum of care needs are met  Outcome: Ongoing

## 2021-09-11 NOTE — PROGRESS NOTES
[facility]  Gastroenterology Progress Note    Bt Natalya Braga is a 81 y.o. adult patient. 1. Hepatic encephalopathy ([address]    2. Gastrointestinal hemorrhage, unspecified gastrointestinal hemorrhage type    3. Thickening of wall of gallbladder    4. Liver failure with hepatic coma, unspecified chronicity (HCC)        SUBJECTIVE:  intubated    ROS:  Cardiovascular ROS: intubated  Gastrointestinal ROS: intubated  Respiratory ROS: intubated    Physical    VITALS:  /62   Pulse 54   Temp 96.9 °F (36.1 °C) (Temporal)   Resp 13   Ht 5' 5\" (1.651 m)   Wt 177 lb 14.6 oz (80.7 kg)   SpO2 100%   BMI 29.61 kg/m²   TEMPERATURE:  Current - Temp: 96.9 °F (36.1 °C); Max - Temp  Av.5 °F (35.8 °C)  Min: 95.3 °F (35.2 °C)  Max: 97.8 °F (36.6 °C)    NAD  Regular rate  Lungs CTA Bilaterally  Abdomen soft, mild distended, Bowel sounds present  Sedated      Data    CBC:   Lab Results   Component Value Date    WBC 11.4 2021    RBC 3.25 2021    HGB 12.1 2021    HGB 12.2 2021    HCT 35.0 2021    HCT 35.1 2021    .1 2021    MCH 37.4 2021    MCHC 34.6 2021    RDW 15.6 2021    PLT 62 2021    MPV 10.3 2021     Hepatic Function Panel:    Lab Results   Component Value Date    ALKPHOS 192 2021     2021     2021    PROT 4.8 2021    BILITOT 25.5 2021           ASSESSMENT / PLAN :    Alcoholic hepatitis, cirrhosis -. He was initially unnamed but later found out that he came to [facility] 2 weeks go as Megan Quale  64 (not his real name and ; he in undocumented). He was discharged 2021 with severe alcoholic hepatitis with TB 28. He was discharged on prednisolone. In the ER, he was found to be hypotensive and required intubation  Bilirubin 27, , , INR 4. Ammonia 136. All concerning for liver failure. Undetectable acetaminophen, salicylate, and alcohol levels. 21 lft stable. Hepatic encephalopathy -ammonia 136 at admit. On lactulose via ng. Ascites -moderate per CT. On Rocephin empirically for SBP. GI bleed -does not appear to have upper GI bleed at this time as OG lavage is yellow at admit and again 9/11/21 ng lavage yellow; rectal today smear red blood lgi source, hemorrhoid vs colitis, etc.   Anemia  - unclear what baseline hemoglobin is.  hbg 7-->12 with 2 u prbc, rechecking now. Leukocytosis -WBC 43. On antibiotics. Improved today but rechecking. Hypotension -may be more from sepsis rather than GI bleed. Hr/bp improved today, slight decrease pressors. CHAO: per renal  Hyponatremia  Hypokalemia: being repleted  Elevated inr: repeat pending.      Recommendations:   -Lactulose every 4 hours, Xifaxan 550 mg twice daily  -Getting vitamin K 10 mg daily x2  - daily inr (pending today)  - follow lft  -Normally would order paracentesis but INR is 4. He is covered with Rocephin. antbx per primary team  -Monitor hemoglobin  -PPI IV twice daily  - check hepatitis panel  - check C.diff  - no egd with neg ng lavage and above issues.    - hold off flex sig/colonoscopy with increased hbg and improving bp/sepsis; in future may need flex sig assess scant red blood per rectum  - etoh cessation key going forward  - hold off prednisolone with infectious issues  - wean pressors and sedation as able per primary team.    Sunny Ye MD , MD  [facility] Gastroenterology and [facility]

## 2021-09-11 NOTE — PROGRESS NOTES
09/10/21 2000   Vent Patient Data   Plateau Pressure 13 [id number]   Static Compliance 65 mL/cmH2O   Dynamic Compliance 46 mL/cmH2O

## 2021-09-11 NOTE — PROGRESS NOTES
Hospitalist Progress Note      PCP: No primary care provider on file. Date of Admission: 9/9/2021    Chief Complaint:     Altered Mental Status       pt brought to ed by [place] [facility] after being found by family sitting in chair unresponsive but jill bs for squad was 79 1 amp given in route patient moaning at triage           Hospital Course:      No family at bedside to provide collateral history. History obtained from discussion with ER provider  Apparently file still being managed and has presented by different names in the past     The patient is a unknown y.o. adult with history of cirrhosis who apparently presented with family having been found by family poorly responsive who initiated CPR. EMS was called and on arrival to the ER patient was intubated. Post intubation he developed hypotension requiring fluid resuscitation with some improvement. Laboratory work-up was noted for ammonia of 136, lipase of 166, troponin of 0.10, sodium 122, potassium 2.9, CO2 of 18, creatinine of 2.0 and transaminitis with ,   CT chest abdomen pelvis revealed cirrhosis with portal hypertension and diffuse gallbladder thickening concerning for acute versus chronic cholecystitis with gallbladder sludge  CT brain with no acute intracranial pathology  EKG sinus rhythm  Urinalysis negative  He is notably jaundiced    Subjective: he is vented sedated.       Medications:  Reviewed    Infusion Medications    sodium chloride      midazolam 2 mg/hr (09/11/21 0543)    norepinephrine 15 mcg/min (09/11/21 0543)    IV infusion builder 75 mL/hr at 09/11/21 0543    sodium chloride      sodium chloride      pantoprozole (PROTONIX) infusion 8 mg/hr (09/11/21 0543)    fentaNYL 100 mcg/hr (09/11/21 0440)     Scheduled Medications    sodium chloride flush  5-40 mL IntraVENous 2 times per day    ipratropium-albuterol  1 ampule Inhalation 4x daily    folic acid, thiamine, multi-vitamin with vitamin K infusion IntraVENous Daily    phytonadione (VITAMIN K)  IVPB  10 mg IntraVENous Daily    rifaximin  550 mg Per NG tube BID    lactulose  20 g Per NG tube 6 times per day    piperacillin-tazobactam  3,375 mg IntraVENous Q8H    Empty Capsule #00 Purple/White  1 each Does not apply Prior to discharge     PRN Meds: sodium chloride flush, sodium chloride, ondansetron **OR** ondansetron, polyethylene glycol, acetaminophen **OR** acetaminophen, potassium chloride, potassium chloride, sodium chloride, sodium chloride      Intake/Output Summary (Last 24 hours) at 9/11/2021 0717  Last data filed at 9/11/2021 0543  Gross per 24 hour   Intake 8157.48 ml   Output 1900 ml   Net 6257.48 ml       Physical Exam Performed:    BP (!) 102/59   Pulse 54   Temp 96 °F (35.6 °C) (Temporal)   Resp 13   Ht 5' 5\" (1.651 m)   Wt 177 lb 14.6 oz (80.7 kg)   SpO2 100%   BMI 29.61 kg/m²     General appearance: visibly jaundiced. Appears to be in his 35s or 40s  HEENT: Pupils equal, round, and reactive to light. Has scleral icterus. Neck: Supple, with full range of motion. No jugular venous distention. Trachea midline. Respiratory:  Vented. . Clear to auscultation, bilaterally without Rales/Wheezes/Rhonchi. Cardiovascular: Regular rate and rhythm with normal S1/S2 without murmurs, rubs or gallops. Abdomen: Soft,  mildlyy distended. BS +   Musculoskeletal: No clubbing, cyanosis or edema bilaterally. Full range of motion without deformity. Skin: Skin color, texture, turgor normal.  No rashes or lesions. Neurologic:  Moving all extremities.   Cranial nerves: II-XII intact, grossly non-focal.  Psychiatric: sedatedCapillary Refill: Brisk,3 seconds, normal   Peripheral Pulses: +2 palpable, equal bilaterally       Labs:   Recent Labs     09/09/21  2201 09/09/21  2201 09/10/21  0927 09/11/21  0035 09/11/21  0600   WBC 42.9*  --  32.8*  --   --    HGB 7.6*   < > 6.0* 7.9* 12.1*   HCT 22.1*   < > 17.0* 22.4* 35.0*     --  100*  --   --     < > = values in this interval not displayed. Recent Labs     09/09/21  2201 09/09/21  2201 09/10/21  0425 09/10/21  0425 09/10/21  1400 09/10/21  2150 09/11/21  0440   *   < > 119*   < > 128* 128* 130*   K 2.9*  --  3.0*  --   --   --  2.0*   CL 82*  --  82*  --   --   --  93*   CO2 13*  --  14*  --   --   --  21   BUN 57*  --  59*  --   --   --  55*   CREATININE 2.0*  --  2.3*  --   --   --  2.2*   CALCIUM 7.6*  --  7.4*  --   --   --  6.9*   PHOS  --   --  7.0*  --   --   --  5.1*    < > = values in this interval not displayed. Recent Labs     09/09/21  2201 09/10/21  0425 09/11/21  0440   * 464* 286*   * 256* 196*   BILITOT 27.9* 27.7* 25.5*   ALKPHOS 312* 247* 192*     Recent Labs     09/09/21  2201 09/10/21  0927 09/10/21  2100   INR 4.16* 4.12* 2.28*     Recent Labs     09/09/21  2201 09/10/21  0425 09/10/21  0819 09/11/21  0440   CKTOTAL  --  292  --  181   TROPONINI 0.10* 0.08* 0.09*  --        Urinalysis:      Lab Results   Component Value Date    NITRU Negative 09/09/2021    WBCUA 0-2 09/09/2021    BACTERIA 1+ 09/09/2021    RBCUA 3-4 09/09/2021    BLOODU TRACE-LYSED 09/09/2021    SPECGRAV 1.020 09/09/2021    GLUCOSEU 100 09/09/2021       Radiology:  XR CHEST PORTABLE   Final Result   New central venous catheter as above, approach is unclear. CT ABDOMEN PELVIS WO CONTRAST Additional Contrast? None   Final Result   1. Hepatic cirrhosis with associated portal venous hypertension and scattered   moderate intraperitoneal free fluid. 2. Diffuse gallbladder wall thickening suggesting association with chronic   versus acute cholecystitis. 3. Suspected gallbladder lumen dependent radiodense sludge. 4. Mild cardiomegaly. CT CHEST WO CONTRAST   Final Result   1. Hepatic cirrhosis with associated portal venous hypertension and scattered   moderate intraperitoneal free fluid.    2. Diffuse gallbladder wall thickening suggesting association with chronic   versus acute cholecystitis. 3. Suspected gallbladder lumen dependent radiodense sludge. 4. Mild cardiomegaly. CT Head WO Contrast   Final Result   No acute intracranial abnormality. XR CHEST PORTABLE   Final Result   Endotracheal tube and orogastric tube are acceptable. Bibasilar opacities are again noted. XR CHEST PORTABLE   Final Result   Reticulation at the left base more than right is nonspecific. Could relate   to subsegmental atelectasis, aspiration pneumonitis, or early pneumonia. US GALLBLADDER RUQ    (Results Pending)           Assessment/Plan:    Active Hospital Problems    Diagnosis     Hepatic encephalopathy ([address] [K72.90]     Hyperammonemia ([address] [E72.20]     Elevated lactic acid level [C80.93]     Metabolic acidosis, increased anion gap (IAG) [E87.2]     Elevated troponin [R77.8]     Hypokalemia [E87.6]     Hyponatremia [E87.1]     Hypotension [I95.9]     Anemia [D64.9]     GI bleed [K92.2]     Transaminitis [R74.01]        Acute respiratory failure  -Secondary to hepatic encephalopathy and ascites. -Patient was poorly responsive and not able to protect his airway and was intubated for this reason. Oxygenation was okay  -He remains intubated and sedated    Hepatic encephalopathy  -Appears to be end-stage cirrhosis at this time or at minimum decompensated cirrhosis. -Ammonia level is elevated and the patient has been started on lactulose. Additionally the patient has been started on rifaximin  -We will follow ammonia levels    Elevated liver functions and jaundice  -Appears to be related to alcoholic cirrhosis  -Unclear if the patient has any hepatitis history.   -GI is following    Lactic acidosis  -Appears to be secondary to sepsis however no clear source of infection at this time patient is covered on broad-spectrum antibiotics  -He may have a possible spontaneous bacterial peritonitis  -GI is consulted  -He is receiving IV fluids    Acute metabolic acidosis  -Appears to be a combination of acute kidney injury possible sepsis lactic acidosis and decompensated cirrhosis.  -We are working to correct the patient's abnormalities  -Nephrology is following as well    Acute kidney injury  -Secondary to all of the above  -Nephrology is following as noted above  -Continue to monitor renal function    Anemia with possible GI bleed  -Patient has been started on octreotide  -He is on PPI as well  -Gastroenterology has been consulted for his cirrhosis as well as for the possible bleed.     Thrombocytopenia  -Secondary to liver disease    DVT Prophylaxis: SCDs  Diet: Diet NPO  Code Status: Full Code    PT/OT Eval Status: When appropriate    Dispo -patient is critically ill medical decision making is high  35 minutes of critical care time spent    Monique Anguiano MD

## 2021-09-11 NOTE — PROGRESS NOTES
This pt has a history of alcohol abuse, cirrhosis, portal HTN, GI bleed, and was intubated upon arrival to the ED on 9/9. His morning labs resulted with a K of 2.0. I will begin administering the 60mEq ordered through his central line but per protocol needed to make you aware of this critical value. Please advise if there is anything else you would like me to do at this time. Thank you. Results for Aristeo Bahena (MRN [id number]) as of 9/11/2021 05:30 9/11/2021 04:40 Sodium: 130 (L) Potassium: 2.0 (LL) Chloride: 93 (L) CO2: 21 BUN: 55 (H) Creatinine: 2.2 (H) Anion Gap: 16 GFR Non-: 26 (A) GFR : 31 (A) Magnesium: 2.10 Lactic Acid: 1.3 Glucose: 110 (H) Calcium: 6.9 (L) Phosphorus: 5.1 (H) Total Protein: 4.8 (L)     See MAR for any new orders placed.

## 2021-09-11 NOTE — PROGRESS NOTES
Assessment complete. Patient remains intubated and sedated. ETT size 8 in place, 25 cm to the lip. Current vent settings: AC/VC. R 14. . PEEP 5. FiO2 35%. OGT in place at 57 cm to the lower lip, clamped. See MAR for gtt details. SB/SR per monitor. Rhonchi noted to bilateral lung fields. BS auscultated. Abdomen distended and soft. Pedal and radial pulses palpated. Peterson patent and draining. Edematous to BUE and BLE. Skin is jaundiced, cool and dry. Repositioned in bed for comfort. Bilateral wrist restraints in place for patient safety.

## 2021-09-11 NOTE — PROGRESS NOTES
Shift assessment complete. Pt is intubated and sedated. ETT in place size 8.0, 25 at the lip. Current Vent Settings: AC/VC R14, , PEEP 5, 45% FiO2. OG in place 57 at the lip to low intermittent suction. Propofol, fentanyl, and versed infusing for sedation, levophed infusing for blood pressure support, see MAR as well as for additional medications given and drips infusing. PERRLA. RASS -2. Rhonchi noted to bilateral lung fields. Pt is SB per monitor. Bowel sounds auscultated. Abdomen soft and distended. Radial and pedal pulses palpated. Peterson patent. IV R and L AC and R IJ CVC patent and infusing. Skin jaundiced, swelling noted to bilateral upper and lower extremities. See doc flowsheets for additional skin assessment. Pt being turned Q2H to prevent skin breakdown, pillow support provided. Plan of care discussed, education provided. Will continue to monitor and assess.      Jasmin Merino, SONALIN, RN

## 2021-09-11 NOTE — PROGRESS NOTES
Office : [phone]     Fax :[phone]       Nephrology  Progress Note      Patient's Name: Bt Edwinna Lesches  12:47 PM  9/11/2021    Reason for Consult:  CHAO , Hyponatremia     Requesting Physician: Dr. Darien Lopez. Chief Complaint:    Chief Complaint   Patient presents with    Altered Mental Status     pt brought to ed by [place] [facility] after being found by family sitting in chair unresponsive but breahing, bs for squad was 79 1 amp given in route patient moaning at triage          History of Present iIlness:    Bt Edwinna Lesches is a gentleman with unknown age possible history of cirrhosis who apparently presented with family having been found by family poorly responsive who initiated CPR. EMS was called and on arrival to the ER patient was intubated. Post intubation he developed hypotension requiring fluid resuscitation with some improvement. Laboratory work-up was noted for ammonia of 136, lipase of 166, troponin of 0.10, sodium 122, potassium 2.9, CO2 of 18, creatinine of 2.0 and transaminitis with ,   CT chest abdomen pelvis revealed cirrhosis with portal hypertension and diffuse gallbladder thickening concerning for acute versus chronic cholecystitis with gallbladder sludge  CT brain with no acute intracranial pathology  EKG sinus rhythm  Urinalysis negative  History has been gathered from medical records      Latest sodium level is 119. Interval hx     Sodium level improved   Intubated on vent support   Low K of 2.0   BUN and creat remains elevated   Hb dropped from 12.2 ---> 7.8     I/O last 3 completed shifts: In: 9935.7 [I.V.:3069.8;  Blood:5373.8; IV Piggyback:1492.1]  Out: 200 [Urine:1725; Emesis/NG output:175]    No past medical history on BMI 29.61 kg/m²   Constitutional:  Intubated   Skin: no rash, turgor wnl  Heent: Icterus +  Neck: no bruits or jvd noted  Cardiovascular:  S1, S2 without m/r/g  Respiratory: CTA B without w/r/r  Abdomen:  +bs, soft, nt, nd  Ext: no  lower extremity edema  Psychiatric: mood and affect appropriate  Musculoskeletal:  Rom, muscular strength intact    Labs:  CBC:   Recent Labs     09/10/21  0927 09/10/21  0927 09/11/21  0035 09/11/21  0600 09/11/21 0900   WBC 32.8*  --   --  11.4* 27.8*   HGB 6.0*   < > 7.9* 12.2*  12.1* 7.8*   *  --   --  62* 86*    < > = values in this interval not displayed. BMP:    Recent Labs     09/09/21  2201 09/09/21  2201 09/10/21  0425 09/10/21  0425 09/10/21  1400 09/10/21  2150 09/11/21  0440   *   < > 119*   < > 128* 128* 130*   K 2.9*  --  3.0*  --   --   --  2.0*   CL 82*  --  82*  --   --   --  93*   CO2 13*  --  14*  --   --   --  21   BUN 57*  --  59*  --   --   --  55*   CREATININE 2.0*  --  2.3*  --   --   --  2.2*   GLUCOSE 92  --  153*  --   --   --  110*    < > = values in this interval not displayed. Ca/Mg/Phos:   Recent Labs     09/09/21  2201 09/10/21  0425 09/11/21  0440   CALCIUM 7.6* 7.4* 6.9*   MG 2.30 2.10 2.10   PHOS  --  7.0* 5.1*     Hepatic:   Recent Labs     09/09/21  2201 09/10/21  0425 09/11/21  0440   * 464* 286*   * 256* 196*   BILITOT 27.9* 27.7* 25.5*   ALKPHOS 312* 247* 192*     Troponin:   Recent Labs     09/09/21 2201 09/10/21  0425 09/10/21  0819   TROPONINI 0.10* 0.08* 0.09*     BNP: No results for input(s): BNP in the last 72 hours. Lipids: No results for input(s): CHOL, TRIG, HDL, LDLCALC, LABVLDL in the last 72 hours.   ABGs:   Recent Labs     09/11/21  0445   PHART 7.378   PO2ART 137.0*   JYW1HBH 36.4     INR:   Recent Labs     09/10/21  0927 09/10/21  2100 09/11/21  0900   INR 4.12* 2.28* 3.01*     UA:  Recent Labs     09/09/21  2244   COLORU Dark yellow   CLARITYU SLCLOUDY   GLUCOSEU 100*   BILIRUBINUR LARGE* KETUA TRACE*   SPECGRAV 1.020   BLOODU TRACE-LYSED*   PHUR 5.5  5.5   PROTEINU 100*   UROBILINOGEN 0.2   NITRU Negative   LEUKOCYTESUR Negative   LABMICR YES   Valiant Hotter NotGiven      Urine Microscopic:   Recent Labs     09/09/21  2244   BACTERIA 1+*   COMU see below   HYALCAST 3-5*   WBCUA 0-2   RBCUA 3-4   EPIU 0-1     Urine Culture: No results for input(s): LABURIN in the last 72 hours. Urine Chemistry:   Recent Labs     09/10/21  1115   NAUR <20                IMAGING:  US GALLBLADDER RUQ   Final Result   Gallbladder wall is thickened. This is a nonspecific finding in the presence   of ascites as it may simply be reactive, rather than secondary to underlying   cholecystitis      Low level echoes are seen within the gallbladder, either stones or sludge. Cirrhosis with moderate ascites         XR CHEST PORTABLE   Final Result   New central venous catheter as above, approach is unclear. CT ABDOMEN PELVIS WO CONTRAST Additional Contrast? None   Final Result   1. Hepatic cirrhosis with associated portal venous hypertension and scattered   moderate intraperitoneal free fluid. 2. Diffuse gallbladder wall thickening suggesting association with chronic   versus acute cholecystitis. 3. Suspected gallbladder lumen dependent radiodense sludge. 4. Mild cardiomegaly. CT CHEST WO CONTRAST   Final Result   1. Hepatic cirrhosis with associated portal venous hypertension and scattered   moderate intraperitoneal free fluid. 2. Diffuse gallbladder wall thickening suggesting association with chronic   versus acute cholecystitis. 3. Suspected gallbladder lumen dependent radiodense sludge. 4. Mild cardiomegaly. CT Head WO Contrast   Final Result   No acute intracranial abnormality. XR CHEST PORTABLE   Final Result   Endotracheal tube and orogastric tube are acceptable. Bibasilar opacities are again noted.          XR CHEST PORTABLE   Final Result   Reticulation at the left base more than right is nonspecific. Could relate   to subsegmental atelectasis, aspiration pneumonitis, or early pneumonia. Assessment/Plan :      1. Hyponatremia   Improved   Continue to monitor     2. Hypotension/ shock state   On iv fluids   Give albumin as needed       3. CHAO 2/2 ATN from hypotension / shock   Recommend to dose adjust all medications  based on renal functions  Maintain SBP> 90 mmHg   Daily weights   AVOID NSAIDs  Avoid Nephrotoxins  Monitor Intake/Output  Call if significant decrease in urine output     4. HIgh anion gap acidosis   Add sodium bicarbonate to iv fluids     5. Hypokalemia - replace iv     6. Hepatic failure.  Encephalopathy   GI following   Getting FFP  On PPI               D/w primary team      Thank you for allowing us to participate in care of Bt Grantnay Paulina         Electronically signed by: Charisse Andrews MD, 9/11/2021, 12:47 PM      [facility] [address]  Office : [phone]  Fax :[phone]

## 2021-09-11 NOTE — PROGRESS NOTES
[facility] Pulmonary/CCM Progress note      Admit Date: 9/9/2021    Chief Complaint: Altered mental status    Subjective: Interval History: Some improvement in hypotension noted. Still requiring vasopressors but dose has decreased. Hemoglobin 7.2. Platelets 86. Urine output adequate, creatinine 2.2. Profound hypokalemia with potassium of 2.5. Good bowel movement-maroon-colored.     Scheduled Meds:   sodium chloride flush  5-40 mL IntraVENous 2 times per day    ipratropium-albuterol  1 ampule Inhalation 4x daily    folic acid, thiamine, multi-vitamin with vitamin K infusion   IntraVENous Daily    phytonadione (VITAMIN K)  IVPB  10 mg IntraVENous Daily    rifaximin  550 mg Per NG tube BID    lactulose  20 g Per NG tube 6 times per day    piperacillin-tazobactam  3,375 mg IntraVENous Q8H    Empty Capsule #00 Purple/White  1 each Does not apply Prior to discharge     Continuous Infusions:   sodium chloride      octreotide (SANDOSTATIN) infusion 25 mcg/hr (09/11/21 1232)    sodium chloride      midazolam Stopped (09/11/21 1200)    norepinephrine 8 mcg/min (09/11/21 1717)    IV infusion builder 75 mL/hr at 09/11/21 0543    sodium chloride      sodium chloride      pantoprozole (PROTONIX) infusion 8 mg/hr (09/11/21 0956)    fentaNYL 125 mcg/hr (09/11/21 1715)     PRN Meds:sodium chloride, sodium chloride flush, sodium chloride, ondansetron **OR** ondansetron, polyethylene glycol, acetaminophen **OR** acetaminophen, potassium chloride, potassium chloride, sodium chloride, sodium chloride    Review of Systems  Unable to obtain since patient is currently intubated and sedated    Objective:     Patient Vitals for the past 8 hrs:   BP Temp Temp src Pulse Resp SpO2   09/11/21 1643    63 17 100 %   09/11/21 1639     17 100 %   09/11/21 1600 (!) 91/57 98 °F (36.7 °C) Temporal 60 16 100 %   09/11/21 1400 (!) 94/59 98.5 °F (36.9 °C) Temporal 63 11 100 %   09/11/21 1215 (!) 99/58 98.6 °F (37 °C)  60 16  09/11/21 1205    61 12 100 %   09/11/21 1204     15 100 %   09/11/21 1200 (!) 99/58 98.3 °F (36.8 °C) Temporal 56 14 100 %   09/11/21 1141 109/61 98.1 °F (36.7 °C) Temporal 58 13 100 %   09/11/21 1126 101/64 96.8 °F (36 °C) Temporal 58 16 100 %   09/11/21 1100 108/61   57 13    09/11/21 1000 (!) 97/57 96.8 °F (36 °C) Temporal 59 15 100 %     I/O last 3 completed shifts: In: 88039.7 [I.V.:3003.8; Blood:5999.8; IV Piggyback:1492.1]  Out: 1700 [Urine:1525; Emesis/NG output:175]  No intake/output data recorded.     General Appearance: Intubated and sedated, in no acute distress  Skin: warm and dry, no rash or erythema  Head: normocephalic and atraumatic  Eyes: pupils equal, round, and reactive to light, extraocular eye movements intact, conjunctivae normal  ENT: external ear and ear canal normal bilaterally, nose without deformity, nasal mucosa and turbinates normal  Neck: supple and non-tender without mass, no cervical lymphadenopathy  Pulmonary/Chest: clear to auscultation bilaterally- no wheezes, rales or rhonchi, normal air movement, no respiratory distress  Cardiovascular: normal rate, regular rhythm,  no murmurs, rubs, distal pulses intact, no carotid bruits  Abdomen: soft, non-tender, non-distended, normal bowel sounds, no masses or organomegaly  Lymph Nodes: Cervical, supraclavicular normal  Extremities: no cyanosis, clubbing or edema  Musculoskeletal: normal range of motion, no joint swelling, deformity or tenderness  Neurologic: Sedated, no focal neurologic deficits    Data Review:  CBC:   Lab Results   Component Value Date    WBC 27.8 09/11/2021    RBC 2.43 09/11/2021     BMP:   Lab Results   Component Value Date    GLUCOSE 110 09/11/2021    CO2 21 09/11/2021    BUN 55 09/11/2021    CREATININE 2.2 09/11/2021    CALCIUM 6.9 09/11/2021     ABG:   Lab Results   Component Value Date    MWP7RQN 21.4 09/11/2021    BEART -3.4 09/11/2021    A4YZTXQD 99.7 09/11/2021    PHART 7.378 09/11/2021    ZWI3JSP 36.4 09/11/2021    PO2ART 137.0 09/11/2021    UIP8JVD 50.4 09/11/2021       Radiology: All pertinent images / reports were reviewed as a part of this visit. Narrative   EXAMINATION:   CT OF THE CHEST WITHOUT CONTRAST; CT OF THE ABDOMEN AND PELVIS WITHOUT   CONTRAST 9/10/2021 12:06 am; 9/10/2021 12:07 am       TECHNIQUE:   CT of the chest was performed without the administration of intravenous   contrast. Multiplanar reformatted images are provided for review. Dose   modulation, iterative reconstruction, and/or weight based adjustment of the   mA/kV was utilized to reduce the radiation dose to as low as reasonably   achievable.; CT of the abdomen and pelvis was performed without the   administration of intravenous contrast. Multiplanar reformatted images are   provided for review. Dose modulation, iterative reconstruction, and/or weight   based adjustment of the mA/kV was utilized to reduce the radiation dose to as   low as reasonably achievable.       COMPARISON:   None.       HISTORY:   ORDERING SYSTEM PROVIDED HISTORY: AMS   TECHNOLOGIST PROVIDED HISTORY:   Reason for exam:->AMS   Decision Support Exception - unselect if not a suspected or confirmed   emergency medical condition->Emergency Medical Condition (MA)   Reason for Exam: AMS   Acuity: Acute   Type of Exam: Initial   Relevant Medical/Surgical History: Altered Mental Status (pt brought to ed by   [facility] after being found by family sitting in chair unresponsive but   breaching, BS for squad was 79 1 amp given in route patient moaning at triage   ); ORDERING SYSTEM PROVIDED HISTORY: AMS, jaundice   TECHNOLOGIST PROVIDED HISTORY:   Reason for exam:->AMS, jaundice   Additional Contrast?->None   Decision Support Exception - unselect if not a suspected or confirmed   emergency medical condition->Emergency Medical Condition (MA)   Reason for Exam: AMS, jaundice   Acuity: Acute   Type of Exam: Initial   Relevant Medical/Surgical History:  Altered Mental Status (pt brought to ed by   [facility] after being found by family sitting in chair unresponsive but   breaching, BS for squad was 79 1 amp given in route patient moaning at triage   )       FINDINGS:       Chest:       Mediastinum: The heart is normal in size and configuration.  No evidence of   pericardial effusion is seen.  No evidence of mediastinal or hilar   lymphadenopathy or mass lesion is identified.       Lungs/pleura: The lungs are well aerated without evidence of consolidation. The pleural surfaces are unremarkable without evidence of pleural thickening   or pleural effusion identified.  The heart is mildly enlarged.       Soft Tissues/Bones: The bones, skeletal muscle bundles, fascial planes and   subcutaneous soft tissues are unremarkable in appearance.           Abdomen/Pelvis:       Organs: Nodular contour of the liver is present.  Associated enlargement of   the main portal vein is seen which measures up to 15 mm in transluminal   diameter.  Diffuse gallbladder wall thickening is identified which measures   up to 7 mm.  Dependent sludge is seen within the gallbladder lumen.  The   liver, gallbladder, spleen, pancreas, adrenal glands, kidneys, are   unremarkable in appearance.       GI/Bowel: The stomach is unremarkable without wall thickening or distention. Bowel loops are unremarkable in appearance without evidence of obstruction,   distension or mucosal thickening.       Pelvis: Urinary bladder is minimally distending in setting of Peterson catheter   placement and is grossly unremarkable in appearance.  No evidence of pelvic   free fluid is seen.       Peritoneum/Retroperitoneum: No evidence of retroperitoneal or intraperitoneal   lymphadenopathy is identified.  Scattered moderate intraperitoneal free fluid   is visualized. .       Bones/Soft Tissues: The bones, skeletal muscle bundles, fascial planes and   subcutaneous soft tissues are unremarkable in appearance.           Impression   1. Hepatic cirrhosis with associated portal venous hypertension and scattered   moderate intraperitoneal free fluid. 2. Diffuse gallbladder wall thickening suggesting association with chronic   versus acute cholecystitis. 3. Suspected gallbladder lumen dependent radiodense sludge. 4. Mild cardiomegaly. Problem List:     Hepatic encephalopathy  Acute versus chronic cholecystitis  Probable alcoholic cirrhosis with hepatitis  Hyperammonemia  Anemia ? GI bleed  Hyponatremia/CHAO    Assessment/Plan:     Patient is currently intubated for airway protection, no significant ventilator needs. ABG acceptable. Hold off on extubation today, consider tomorrow if hemodynamically stable and good mental function.     Hepatic encephalopathy/hyperammonemia. Ammonia 136>64, bilirubin 25, >286, >196, alk phos 247>192. Diffuse gallbladder wall thickening ? Acute versus chronic cholecystitis with gall bladder sludge. Right upper quadrant ultrasound pretty much confirms the same. On IV Zosyn empirically. Lactulose and rifaximin for hyperammonemia. Salicylate, acetaminophen and alcohol levels negative. Urine tox screen was negative.     Anemia-received 2 units of PRBC hemoglobin 6.0>7.2. INR 3.01, will administer 2 units of FFP and vitamin K 10 mg IV. Closely monitor need for blood products. Continue on IV Protonix drip, add octreotide drip. GI holding off on endoscopy due to coagulopathy.     Hypotension concerning for septic +/-hemorrhagic shock, lactate 4.7>1.3. WBC 42>27. Continue with IV fluid resuscitation. Source of sepsis could be cholecystitis or SBP. Continue Zosyn. COVID-19 PCR negative.     Mild troponin elevation possibly related to demand ischemia, no acute EKG changes.     Hyponatremia/CHAO, possibly from volume depletion. Urine sodium <20, could point towards volume depletion versus HRS. Creatinine 2.3. Acidosis is resolved.   Patient received albumin infusions for potential HRS.  DC sodium bicarbonate drip. Monitor urine output closely. 1.7 L / 24 hours. Needs aggressive potassium replacement-potassium of 2.0.     Hold tube feeds.     Adan Alvarado MD     Critical care time of 45 minutes excluding procedures

## 2021-09-11 NOTE — PLAN OF CARE
Problem: Non-Violent Restraints  Goal: Removal from restraints as soon as assessed to be safe  9/11/2021 1200 by Jessika Aburto RN  Outcome: Ongoing  9/10/2021 2309 by Jayla Gan RN  Outcome: Ongoing  Goal: No harm/injury to patient while restraints in use  9/11/2021 1200 by Jessika Aburto RN  Outcome: Ongoing  9/10/2021 2309 by Jayla Gan RN  Outcome: Ongoing  Goal: Patient's dignity will be maintained  9/11/2021 1200 by Jessika Aburto RN  Outcome: Ongoing  9/10/2021 2309 by Jayla Gan RN  Outcome: Ongoing     Problem: Nutrition  Goal: Optimal nutrition therapy  9/11/2021 1200 by Jessika Aburto RN  Outcome: Ongoing  9/10/2021 2309 by Jayla Gan RN  Outcome: Ongoing     Problem: Skin Integrity:  Goal: Will show no infection signs and symptoms  Description: Will show no infection signs and symptoms  9/11/2021 1200 by Jessika Aburto RN  Outcome: Ongoing  9/10/2021 2309 by Jayla Gan RN  Outcome: Ongoing  Goal: Absence of new skin breakdown  Description: Absence of new skin breakdown  9/11/2021 1200 by Jessika Aburto RN  Outcome: Ongoing  9/10/2021 2309 by Jayla Gan RN  Outcome: Ongoing     Problem: Falls - Risk of:  Goal: Will remain free from falls  Description: Will remain free from falls  9/11/2021 1200 by Jessika Aburto RN  Outcome: Ongoing  9/10/2021 2309 by Jayla Gan RN  Outcome: Ongoing  Goal: Absence of physical injury  Description: Absence of physical injury  9/11/2021 1200 by Jessika Aburto RN  Outcome: Ongoing  9/10/2021 2309 by Jayla Gan RN  Outcome: Ongoing     Problem: Infection:  Goal: Will remain free from infection  Description: Will remain free from infection  9/11/2021 1200 by Jessika Aburto RN  Outcome: Ongoing  9/10/2021 2309 by Jayla Gan RN  Outcome: Ongoing     Problem: Safety:  Goal: Free from accidental physical injury  Description: Free from accidental physical injury  9/11/2021 1200 by Jessika Aburto RN  Outcome: Ongoing  9/10/2021 2309 by Jayla Gan RN  Outcome: Ongoing  Goal: Free from intentional harm  Description: Free from intentional harm  9/11/2021 1200 by Kellie Pinedo RN  Outcome: Ongoing  9/10/2021 2309 by Justina Beasley RN  Outcome: Ongoing     Problem: Daily Care:  Goal: Daily care needs are met  Description: Daily care needs are met  9/11/2021 1200 by Kellie Pinedo RN  Outcome: Ongoing  9/10/2021 2309 by Justina Beasley RN  Outcome: Ongoing     Problem: Pain:  Goal: Patient's pain/discomfort is manageable  Description: Patient's pain/discomfort is manageable  9/11/2021 1200 by Kellie Pinedo RN  Outcome: Ongoing  9/10/2021 2309 by Justina Beasley RN  Outcome: Ongoing     Problem: Skin Integrity:  Goal: Skin integrity will stabilize  Description: Skin integrity will stabilize  9/11/2021 1200 by Kellie Pinedo RN  Outcome: Ongoing  9/10/2021 2309 by Justina Beasley RN  Outcome: Ongoing     Problem: Discharge Planning:  Goal: Patients continuum of care needs are met  Description: Patients continuum of care needs are met  9/11/2021 1200 by Kellie Pinedo RN  Outcome: Ongoing  9/10/2021 2309 by Justina Beasley RN  Outcome: Ongoing

## 2021-09-12 NOTE — PROGRESS NOTES
09/12/21 0802   Vent Patient Data   Plateau Pressure 26 ZON06   Static Compliance 21 mL/cmH2O   Dynamic Compliance 17.6 mL/cmH2O

## 2021-09-12 NOTE — PROGRESS NOTES
Assessment complete. VSS. Remains intubated and sedated. ETT in place size 8, 25 cm at lip. Current vent settings: AC/VC. Rate 14. . PEEP 5. FiO2 35%. OGT in place at 57 cm and clamped. Fentanyl infusing for sedation. Levophed infusing. Lung sounds auscultated. SR per monitor. Abdomen soft and distended. Radial and pedal pulses palpated. Skin warm dry and intact. Jaundiced and swollen. Peterson patent and draining. Right IJ infusing. Restraints for patient safety in place. Repositioned for comfort/skin breakdown prevention.

## 2021-09-12 NOTE — PROGRESS NOTES
--   --   --  80*    < > = values in this interval not displayed. Recent Labs     09/10/21  0425 09/10/21  1400 09/11/21  0440 09/11/21  0440 09/11/21  1255 09/11/21  1255 09/11/21  1800 09/11/21  2205 09/12/21  0050 09/12/21  0530   *   < > 130*   < > 133*   < > 132*  --  131* 133*   K 3.0*  --  2.0*  --  2.5*  --   --  2.8*  --  3.7   CL 82*  --  93*  --   --   --   --   --   --  98*   CO2 14*  --  21  --   --   --   --   --   --  21   BUN 59*  --  55*  --   --   --   --   --   --  54*   CREATININE 2.3*  --  2.2*  --   --   --   --   --   --  2.0*   CALCIUM 7.4*  --  6.9*  --   --   --   --   --   --  7.5*   PHOS 7.0*  --  5.1*  --   --   --   --   --   --  4.4    < > = values in this interval not displayed. Recent Labs     09/10/21  0425 09/11/21  0440 09/12/21  0530   * 286* 258*   * 196* 183*   BILITOT 27.7* 25.5* 27.2*   ALKPHOS 247* 192* 217*     Recent Labs     09/10/21  2100 09/11/21  0900 09/12/21  0530   INR 2.28* 3.01* 2.85*     Recent Labs     09/09/21  2201 09/10/21  0425 09/10/21  0819 09/11/21  0440 09/12/21  0530   CKTOTAL  --  292  --  181 139   TROPONINI 0.10* 0.08* 0.09*  --   --        Urinalysis:      Lab Results   Component Value Date    NITRU Negative 09/09/2021    WBCUA 0-2 09/09/2021    BACTERIA 1+ 09/09/2021    RBCUA 3-4 09/09/2021    BLOODU TRACE-LYSED 09/09/2021    SPECGRAV 1.020 09/09/2021    GLUCOSEU 100 09/09/2021       Radiology:  US GALLBLADDER RUQ   Final Result   Gallbladder wall is thickened. This is a nonspecific finding in the presence   of ascites as it may simply be reactive, rather than secondary to underlying   cholecystitis      Low level echoes are seen within the gallbladder, either stones or sludge. Cirrhosis with moderate ascites         XR CHEST PORTABLE   Final Result   New central venous catheter as above, approach is unclear. CT ABDOMEN PELVIS WO CONTRAST Additional Contrast? None   Final Result   1.  Hepatic cirrhosis with associated portal venous hypertension and scattered   moderate intraperitoneal free fluid. 2. Diffuse gallbladder wall thickening suggesting association with chronic   versus acute cholecystitis. 3. Suspected gallbladder lumen dependent radiodense sludge. 4. Mild cardiomegaly. CT CHEST WO CONTRAST   Final Result   1. Hepatic cirrhosis with associated portal venous hypertension and scattered   moderate intraperitoneal free fluid. 2. Diffuse gallbladder wall thickening suggesting association with chronic   versus acute cholecystitis. 3. Suspected gallbladder lumen dependent radiodense sludge. 4. Mild cardiomegaly. CT Head WO Contrast   Final Result   No acute intracranial abnormality. XR CHEST PORTABLE   Final Result   Endotracheal tube and orogastric tube are acceptable. Bibasilar opacities are again noted. XR CHEST PORTABLE   Final Result   Reticulation at the left base more than right is nonspecific. Could relate   to subsegmental atelectasis, aspiration pneumonitis, or early pneumonia. US GUIDED PARACENTESIS    (Results Pending)   IR US GUIDED PARACENTESIS    (Results Pending)           Assessment/Plan:    Active Hospital Problems    Diagnosis     Hepatic encephalopathy ([address] [K72.90]     Hyperammonemia ([address] [E72.20]     Elevated lactic acid level [A36.04]     Metabolic acidosis, increased anion gap (IAG) [E87.2]     Elevated troponin [R77.8]     Hypokalemia [E87.6]     Hyponatremia [E87.1]     Hypotension [I95.9]     Anemia [D64.9]     GI bleed [K92.2]     Transaminitis [R74.01]        Acute respiratory failure  -Secondary to hepatic encephalopathy and ascites. -Patient was poorly responsive and not able to protect his airway and was intubated for this reason. Oxygenation was okay  -He remains intubated and sedated    Hepatic encephalopathy  -Appears to be end-stage cirrhosis at this time or at minimum decompensated cirrhosis.   -Ammonia level is elevated and the patient has been started on lactulose. Additionally the patient has been started on rifaximin  -We will follow ammonia levels    Elevated liver functions and jaundice  -Appears to be related to alcoholic cirrhosis  -Unclear if the patient has any hepatitis history. -GI is following    Lactic acidosis  -Appears to be secondary to sepsis however no clear source of infection at this time patient is covered on broad-spectrum antibiotics  -He may have a possible spontaneous bacterial peritonitis  -GI is consulted  -He is receiving IV fluids    Acute metabolic acidosis  -Appears to be a combination of acute kidney injury possible sepsis lactic acidosis and decompensated cirrhosis.  -We are working to correct the patient's abnormalities  -Nephrology is following as well    Acute kidney injury  -Secondary to all of the above  -Nephrology is following as noted above  -Continue to monitor renal function    Anemia with possible GI bleed  -Patient has been started on octreotide  -He is on PPI as well  -Gastroenterology has been consulted for his cirrhosis as well as for the possible bleed.     Thrombocytopenia  -Secondary to liver disease    DVT Prophylaxis: SCDs  Diet: Diet NPO  Diet NPO  Code Status: Full Code    PT/OT Eval Status: When appropriate    Dispo -patient is critically ill medical decision making is high  35 minutes of critical care time spent    Alvaro Sim MD

## 2021-09-12 NOTE — FLOWSHEET NOTE
09/12/21 1600   Provider Notification   Reason for Communication Critical Value (comment)  (Decreased UOP)   Provider Name Dr. Juana Burton   Provider Notification Physician   Method of Communication Secure Message   Response See orders   Notification Time 1600     \"FYI only 50 cc of urine out on my shift. bladder scan says >600. johnson changed. minimal output from new johnson. patient has ascites as well. bladder scan results could be from that. Please advise\"    See new orders.

## 2021-09-12 NOTE — PROGRESS NOTES
[facility] GI  Gastroenterology Progress Note    Amparo Orellana is a 55 y.o. male patient. 1. Hepatic encephalopathy ([address]    2. Gastrointestinal hemorrhage, unspecified gastrointestinal hemorrhage type    3. Thickening of wall of gallbladder    4. Liver failure with hepatic coma, unspecified chronicity (HCC)        SUBJECTIVE:  intubated    ROS:  Cardiovascular ROS: intubated  Gastrointestinal ROS: intubated  Respiratory ROS: intubated    Physical    VITALS:  /64   Pulse 70   Temp 97.1 °F (36.2 °C) (Temporal)   Resp 15   Ht 5' 5\" (1.651 m)   Wt 179 lb 0.2 oz (81.2 kg)   SpO2 100%   BMI 29.79 kg/m²   TEMPERATURE:  Current - Temp: 97.1 °F (36.2 °C); Max - Temp  Av.4 °F (36.3 °C)  Min: 96 °F (35.6 °C)  Max: 98.6 °F (37 °C)    NAD  Regular rate  Lungs CTA Bilaterally  Abdomen soft, mild distended, Bowel sounds present  Sedated      Data    CBC:   Lab Results   Component Value Date    WBC 23.0 2021    RBC 2.44 2021    HGB 7.9 2021    HCT 22.5 2021    MCV 92.0 2021    MCH 32.4 2021    MCHC 35.2 2021    RDW 17.4 2021    PLT 80 2021    MPV 10.1 2021     Hepatic Function Panel:    Lab Results   Component Value Date    ALKPHOS 217 2021     2021     2021    PROT 4.8 2021    BILITOT 27.2 2021           ASSESSMENT / PLAN :    Alcoholic hepatitis, cirrhosis -. He was initially unnamed but later found out that he came to [facility] weeks go as Cristiano Todd  64 (not his real name and ; he in undocumented). He was discharged 2021 with severe alcoholic hepatitis with TB 28. He was discharged on prednisolone. In the ER, he was found to be hypotensive and required intubation  Bilirubin 27, , , INR 4. Ammonia 136. All concerning for liver failure. Undetectable acetaminophen, salicylate, and alcohol levels. viral hepatitis panel normal. 21 lft stable.    Hepatic encephalopathy -ammonia 136 at admit. On lactulose via ng. Ascites -moderate per CT. On Rocephin empirically for SBP. GI bleed -does not appear to have upper GI bleed at this time as OG lavage is yellow at admit and again 9/11/21 ng lavage yellow; rectal 9/11/21 smear red blood lgi source, hemorrhoid vs colitis, etc. 9/12/21 hbg remains stable, scant rectal heme per nurse. Anemia  - unclear what baseline hemoglobin is.  hbg remains stable 7's. Leukocytosis -WBC 43 at admit. c diff negative. On antibiotics. Improved. Hypotension -may be more from sepsis rather than GI bleed. Remains on pressor but lower dose.    CHAO: per renal  Hyponatremia  Hypokalemia:  improved  Elevated inr:  stabalized around 2.8.     Recommendations:   - prep via ng with brian today for colonoscopy (or flex sig) tomrrow for brbpr; can also pursue egd/assess for varices at that time to decide future octreotide (no ugi bleeding curently),-- I discussed with brother Guadalupe Esposito who agreed proceed.    -Lactulose every 4 hours, Xifaxan 550 mg twice daily  - received vitamin K  - daily inr   - follow lft  -paracentesis this week;  antbx per primary team  -Monitor hemoglobin  -PPI IV twice daily  - etoh cessation key going forward  - hold off prednisolone with infectious issues  - wean pressors and sedation as able per primary team.    Angel Tyson MD , MD  [facility] Gastroenterology and [facility]

## 2021-09-12 NOTE — PROGRESS NOTES
[facility] Pulmonary/CCM Progress note      Admit Date: 9/9/2021    Chief Complaint: Altered mental status    Subjective: Interval History: Wakes up but does not follow commands. Still requiring norepinephrine drip. Significant abdominal distention. Ammonia 75. Creatinine 2.0. Urine output 900 cc. Scheduled Meds:   sodium chloride flush  5-40 mL IntraVENous 2 times per day    ipratropium-albuterol  1 ampule Inhalation 4x daily    rifaximin  550 mg Per NG tube BID    lactulose  20 g Per NG tube 6 times per day    piperacillin-tazobactam  3,375 mg IntraVENous Q8H    Empty Capsule #00 Purple/White  1 each Does not apply Prior to discharge     Continuous Infusions:   sodium chloride      octreotide (SANDOSTATIN) infusion 25 mcg/hr (09/12/21 0340)    sodium chloride      midazolam Stopped (09/11/21 1004)    norepinephrine 10 mcg/min (09/12/21 0856)    sodium chloride      sodium chloride      pantoprozole (PROTONIX) infusion 8 mg/hr (09/12/21 0821)    fentaNYL 125 mcg/hr (09/12/21 0319)     PRN Meds:sodium chloride, sodium chloride flush, sodium chloride, ondansetron **OR** ondansetron, acetaminophen **OR** acetaminophen, potassium chloride, potassium chloride, sodium chloride, sodium chloride    Review of Systems  Unable to obtain since patient is currently intubated and sedated    Objective:     Patient Vitals for the past 8 hrs:   BP Temp Temp src Pulse Resp SpO2   09/12/21 1600 (!) 91/58 98.6 °F (37 °C) Temporal 79 14 100 %   09/12/21 1546    81 15 100 %   09/12/21 1200 (!) 98/55 98 °F (36.7 °C) Temporal 74 13 100 %   09/12/21 1159    76 15 100 %   09/12/21 1000 103/62 98.1 °F (36.7 °C) Temporal 79 12 100 %     I/O last 3 completed shifts: In: 2397.5 [I.V.:1747.3; IV Piggyback:650.2]  Out: 450 [Urine:450]  No intake/output data recorded.     General Appearance: Intubated and sedated, in no acute distress  Skin: warm and dry, no rash or erythema  Head: normocephalic and atraumatic  Eyes: without the   administration of intravenous contrast. Multiplanar reformatted images are   provided for review. Dose modulation, iterative reconstruction, and/or weight   based adjustment of the mA/kV was utilized to reduce the radiation dose to as   low as reasonably achievable.       COMPARISON:   None.       HISTORY:   ORDERING SYSTEM PROVIDED HISTORY: AMS   TECHNOLOGIST PROVIDED HISTORY:   Reason for exam:->AMS   Decision Support Exception - unselect if not a suspected or confirmed   emergency medical condition->Emergency Medical Condition (MA)   Reason for Exam: AMS   Acuity: Acute   Type of Exam: Initial   Relevant Medical/Surgical History: Altered Mental Status (pt brought to ed by   [facility] after being found by family sitting in chair unresponsive but   breaching, BS for squad was 79 1 amp given in route patient moaning at triage   ); ORDERING SYSTEM PROVIDED HISTORY: AMS, jaundice   TECHNOLOGIST PROVIDED HISTORY:   Reason for exam:->AMS, jaundice   Additional Contrast?->None   Decision Support Exception - unselect if not a suspected or confirmed   emergency medical condition->Emergency Medical Condition (MA)   Reason for Exam: AMS, jaundice   Acuity: Acute   Type of Exam: Initial   Relevant Medical/Surgical History: Altered Mental Status (pt brought to ed by   [facility] after being found by family sitting in chair unresponsive but   breaching, BS for squad was 79 1 amp given in route patient moaning at triage   )       FINDINGS:       Chest:       Mediastinum: The heart is normal in size and configuration.  No evidence of   pericardial effusion is seen.  No evidence of mediastinal or hilar   lymphadenopathy or mass lesion is identified.       Lungs/pleura: The lungs are well aerated without evidence of consolidation. The pleural surfaces are unremarkable without evidence of pleural thickening   or pleural effusion identified.  The heart is mildly enlarged.       Soft Tissues/Bones:  The bones, skeletal muscle bundles, fascial planes and   subcutaneous soft tissues are unremarkable in appearance.           Abdomen/Pelvis:       Organs: Nodular contour of the liver is present.  Associated enlargement of   the main portal vein is seen which measures up to 15 mm in transluminal   diameter.  Diffuse gallbladder wall thickening is identified which measures   up to 7 mm.  Dependent sludge is seen within the gallbladder lumen.  The   liver, gallbladder, spleen, pancreas, adrenal glands, kidneys, are   unremarkable in appearance.       GI/Bowel: The stomach is unremarkable without wall thickening or distention. Bowel loops are unremarkable in appearance without evidence of obstruction,   distension or mucosal thickening.       Pelvis: Urinary bladder is minimally distending in setting of Peterson catheter   placement and is grossly unremarkable in appearance.  No evidence of pelvic   free fluid is seen.       Peritoneum/Retroperitoneum: No evidence of retroperitoneal or intraperitoneal   lymphadenopathy is identified.  Scattered moderate intraperitoneal free fluid   is visualized. .       Bones/Soft Tissues: The bones, skeletal muscle bundles, fascial planes and   subcutaneous soft tissues are unremarkable in appearance.           Impression   1. Hepatic cirrhosis with associated portal venous hypertension and scattered   moderate intraperitoneal free fluid. 2. Diffuse gallbladder wall thickening suggesting association with chronic   versus acute cholecystitis. 3. Suspected gallbladder lumen dependent radiodense sludge. 4. Mild cardiomegaly. Problem List:     Hepatic encephalopathy  Acute versus chronic cholecystitis  Probable alcoholic cirrhosis with hepatitis  Hyperammonemia  Anemia ? GI bleed  Hyponatremia/CHAO    Assessment/Plan:     Patient is currently intubated for airway protection, no significant ventilator needs. ABG acceptable.   Not ready for intubation yet-apparently EGD is planned for tomorrow.     Hepatic encephalopathy/hyperammonemia. Ammonia 136>64>75, bilirubin 25, >258, >183, alk phos 247>217. Diffuse gallbladder wall thickening ? Acute versus chronic cholecystitis with gall bladder sludge. Right upper quadrant ultrasound pretty much confirms the same. On IV Zosyn empirically. Lactulose and rifaximin for hyperammonemia with improvement. Salicylate, acetaminophen and alcohol levels negative. Urine tox screen was negative. Acute blood loss anemia-thought to be related to GI bleed. Peptic ulcer disease versus varices. On IV Protonix drip and octreotide. Plans for EGD tomorrow.     Anemia-multiple blood product transfusion. Received 2 units of PRBC and 6 units of FFP so far. INR 2.85. Receiving daily vitamin K 10 mg IV.     Hypotension concerning for septic +/-hemorrhagic shock, lactate 4.7>1.3. WBC 42>23. Continue with IV fluid resuscitation. Source of sepsis could be cholecystitis or SBP. Will organize for ultrasound-guided paracentesis tomorrow. Continue Zosyn. Blood culture growing Staphylococcus from 1 of 2 bottles, no identification on PCR. COVID-19 PCR negative.     Mild troponin elevation possibly related to demand ischemia, no acute EKG changes.     Hyponatremia/CHAO, possibly from volume depletion. Urine sodium <20, could point towards volume depletion versus HRS. Creatinine 2.3. Acidosis is resolved. Patient received albumin infusions for potential HRS. Potassium adequately corrected.     Hold tube feeds, plans for EGD tomorrow.     Pieter Cabrera MD     Critical care time of 45 minutes excluding procedures

## 2021-09-12 NOTE — PROGRESS NOTES
09/11/21 1939   Vent Patient Data   Plateau Pressure 17 BGH98   Static Compliance 41 mL/cmH2O   Dynamic Compliance 34 mL/cmH2O

## 2021-09-12 NOTE — PLAN OF CARE
Problem: Non-Violent Restraints  Goal: Removal from restraints as soon as assessed to be safe  9/11/2021 2210 by Collette Ricker, RN  Outcome: Ongoing  9/11/2021 1200 by Matt Sommers RN  Outcome: Ongoing  Goal: No harm/injury to patient while restraints in use  9/11/2021 2210 by Collette Ricker, RN  Outcome: Ongoing  9/11/2021 1200 by Matt Sommers RN  Outcome: Ongoing  Goal: Patient's dignity will be maintained  9/11/2021 2210 by Collette Ricker, RN  Outcome: Ongoing  9/11/2021 1200 by Matt Sommers RN  Outcome: Ongoing     Problem: Nutrition  Goal: Optimal nutrition therapy  9/11/2021 2210 by Collette Ricker, RN  Outcome: Ongoing  9/11/2021 1200 by Matt Sommers RN  Outcome: Ongoing     Problem: Skin Integrity:  Goal: Will show no infection signs and symptoms  Description: Will show no infection signs and symptoms  9/11/2021 2210 by Collette Ricker, RN  Outcome: Ongoing  9/11/2021 1200 by Matt Sommers RN  Outcome: Ongoing  Goal: Absence of new skin breakdown  Description: Absence of new skin breakdown  9/11/2021 2210 by Collette Ricker, RN  Outcome: Ongoing  9/11/2021 1200 by Matt Sommers RN  Outcome: Ongoing     Problem: Falls - Risk of:  Goal: Will remain free from falls  Description: Will remain free from falls  9/11/2021 2210 by Collette Ricker, RN  Outcome: Ongoing  9/11/2021 1200 by Matt Sommers RN  Outcome: Ongoing  Goal: Absence of physical injury  Description: Absence of physical injury  9/11/2021 2210 by Collette Ricker, RN  Outcome: Ongoing  9/11/2021 1200 by Matt Sommers RN  Outcome: Ongoing     Problem: Infection:  Goal: Will remain free from infection  Description: Will remain free from infection  9/11/2021 2210 by Collette Ricker, RN  Outcome: Ongoing  9/11/2021 1200 by Matt Sommers RN  Outcome: Ongoing     Problem: Safety:  Goal: Free from accidental physical injury  Description: Free from accidental physical injury  9/11/2021 2210 by Collette Ricker, RN  Outcome: Ongoing  9/11/2021 1200 by Matt Sommers RN  Outcome: Ongoing  Goal: Free from intentional harm  Description: Free from intentional harm  9/11/2021 2210 by Priscila Powers RN  Outcome: Ongoing  9/11/2021 1200 by Sohail Hennessy RN  Outcome: Ongoing     Problem: Daily Care:  Goal: Daily care needs are met  Description: Daily care needs are met  9/11/2021 2210 by Priscila Powers RN  Outcome: Ongoing  9/11/2021 1200 by Sohail Hennessy RN  Outcome: Ongoing     Problem: Pain:  Goal: Patient's pain/discomfort is manageable  Description: Patient's pain/discomfort is manageable  9/11/2021 2210 by Priscila Powers RN  Outcome: Ongoing  9/11/2021 1200 by Sohail Hennessy RN  Outcome: Ongoing     Problem: Skin Integrity:  Goal: Skin integrity will stabilize  Description: Skin integrity will stabilize  9/11/2021 2210 by Priscila Powers RN  Outcome: Ongoing  9/11/2021 1200 by Sohail Hennessy RN  Outcome: Ongoing     Problem: Discharge Planning:  Goal: Patients continuum of care needs are met  Description: Patients continuum of care needs are met  9/11/2021 2210 by Priscila Powers RN  Outcome: Ongoing  9/11/2021 1200 by Sohail Hennessy RN  Outcome: Ongoing

## 2021-09-12 NOTE — PROGRESS NOTES
Shift assessment complete. Pt is intubated and sedated. ETT in place size 8.0, 25 at the lip. Current Vent Settings: AC/VC R14, , PEEP 5, 35% FiO2. OG in place 57 at the lip, clamped.      Fentanyl infusing for sedation and levophed infusing for blood pressure support, see MAR as well as for additional medications given and drips infusing. PERRLA. RASS -1.      Lung sounds auscultated, diminished in bilateral upper and lower lobes. Pt is SR per monitor. Bowel sounds auscultated. Abdomen soft and distended. Radial and pedal pulses palpated.      Peterson patent. IV R and L AC and R IJ CVC patent and infusing.     Skin jaundiced, swelling noted to bilateral upper and lower extremities. See doc flowsheets for additional skin assessment. Bilateral wrist restraints in place for patient safety.     Pt being turned Q2H to prevent skin breakdown, pillow support provided. Plan of care discussed, education provided.  Will continue to monitor and assess.      Marylene Dimes, SONALIN, RN

## 2021-09-12 NOTE — PROGRESS NOTES
Reassessment complete. VSS. KCl infusing for potassium replacement per protocol, see MAR. No other acute changes noted to previous assessment (see doc flowsheets). Will continue to monitor and assess.      SONALI DamonN, RN

## 2021-09-12 NOTE — PROGRESS NOTES
Office : [phone]     Fax :[phone]       Nephrology  Progress Note      Patient's Name: Wendi Asher  2:34 PM  9/12/2021    Reason for Consult:  CHAO , Hyponatremia     Requesting Physician: Dr. Shante Cheung. Chief Complaint:    Chief Complaint   Patient presents with    Altered Mental Status     pt brought to ed by [facility] after being found by family sitting in chair unresponsive but breahing, bs for squad was 79 1 amp given in route patient moaning at triage          History of Present iIlness:    Wendi Asher is a gentleman with unknown age possible history of cirrhosis who apparently presented with family having been found by family poorly responsive who initiated CPR. EMS was called and on arrival to the ER patient was intubated. Post intubation he developed hypotension requiring fluid resuscitation with some improvement. Laboratory work-up was noted for ammonia of 136, lipase of 166, troponin of 0.10, sodium 122, potassium 2.9, CO2 of 18, creatinine of 2.0 and transaminitis with ,   CT chest abdomen pelvis revealed cirrhosis with portal hypertension and diffuse gallbladder thickening concerning for acute versus chronic cholecystitis with gallbladder sludge  CT brain with no acute intracranial pathology  EKG sinus rhythm  Urinalysis negative  History has been gathered from medical records      Latest sodium level is 119. Interval hx       Intubated on vent support     On pressor support    BUN and creat remains elevated         I/O last 3 completed shifts: In: 3023.5 [I.V.:1747.3; Blood:626; IV Piggyback:650.2]  Out: 900 [Urine:900]    No past medical history on file. No past surgical history on file. No family history on file. Allergies:  Patient has no known allergies. Current Medications:    polyethylene glycol (GoLYTELY) solution 4,000 mL, Once  0.9 % sodium chloride infusion, PRN  octreotide (SANDOSTATIN) 500 mcg in sodium chloride 0.9 % 100 mL infusion, Continuous  sodium chloride flush 0.9 % injection 5-40 mL, 2 times per day  sodium chloride flush 0.9 % injection 5-40 mL, PRN  0.9 % sodium chloride infusion, PRN  ondansetron (ZOFRAN-ODT) disintegrating tablet 4 mg, Q8H PRN   Or  ondansetron (ZOFRAN) injection 4 mg, Q6H PRN  acetaminophen (TYLENOL) tablet 650 mg, Q6H PRN   Or  acetaminophen (TYLENOL) suppository 650 mg, Q6H PRN  ipratropium-albuterol (DUONEB) nebulizer solution 1 ampule, 4x daily  potassium chloride 10 mEq/100 mL IVPB (Peripheral Line), PRN  midazolam (VERSED) 100 mg in dextrose 5 % 100 mL infusion, Continuous  potassium chloride 20 mEq/50 mL IVPB (Central Line), PRN  rifaximin (XIFAXAN) tablet 550 mg, BID  lactulose (CHRONULAC) 10 GM/15ML solution 20 g, 6 times per day  norepinephrine (LEVOPHED) 16 mg in dextrose 5% 250 mL infusion, Continuous  0.9 % sodium chloride infusion, PRN  0.9 % sodium chloride infusion, PRN  pantoprazole (PROTONIX) 80 mg in sodium chloride 0.9 % 100 mL infusion, Continuous  piperacillin-tazobactam (ZOSYN) 3,375 mg in dextrose 5 % 50 mL IVPB extended infusion (mini-bag), Q8H  PATIENT HOME MEDS STORED IN PHARMACY!!!!, Prior to discharge  fentaNYL 10 mcg/mL infusion, Continuous        Physical exam:     Vitals:  BP (!) 98/55   Pulse 74   Temp 98 °F (36.7 °C) (Temporal)   Resp 13   Ht 5' 5\" (1.651 m)   Wt 179 lb 0.2 oz (81.2 kg)   SpO2 100%   BMI 29.79 kg/m²   Constitutional:  Intubated   Skin: no rash, turgor wnl  Heent:   Icterus +  Neck: no bruits or jvd noted  Cardiovascular:  S1, S2 without m/r/g  Respiratory: CTA B without w/r/r  Abdomen:  +bs, soft, nt, nd  Ext: no  lower extremity edema  Psychiatric: mood and affect appropriate  Musculoskeletal:  Rom, muscular strength LEUKOCYTESUR Negative   LABMICR YES   Nathanel Dakins NotGiven      Urine Microscopic:   Recent Labs     09/09/21  2244   BACTERIA 1+*   COMU see below   HYALCAST 3-5*   WBCUA 0-2   RBCUA 3-4   EPIU 0-1     Urine Culture: No results for input(s): LABURIN in the last 72 hours. Urine Chemistry:   Recent Labs     09/10/21  1115   NAUR <20                IMAGING:  US GALLBLADDER RUQ   Final Result   Gallbladder wall is thickened. This is a nonspecific finding in the presence   of ascites as it may simply be reactive, rather than secondary to underlying   cholecystitis      Low level echoes are seen within the gallbladder, either stones or sludge. Cirrhosis with moderate ascites         XR CHEST PORTABLE   Final Result   New central venous catheter as above, approach is unclear. CT ABDOMEN PELVIS WO CONTRAST Additional Contrast? None   Final Result   1. Hepatic cirrhosis with associated portal venous hypertension and scattered   moderate intraperitoneal free fluid. 2. Diffuse gallbladder wall thickening suggesting association with chronic   versus acute cholecystitis. 3. Suspected gallbladder lumen dependent radiodense sludge. 4. Mild cardiomegaly. CT CHEST WO CONTRAST   Final Result   1. Hepatic cirrhosis with associated portal venous hypertension and scattered   moderate intraperitoneal free fluid. 2. Diffuse gallbladder wall thickening suggesting association with chronic   versus acute cholecystitis. 3. Suspected gallbladder lumen dependent radiodense sludge. 4. Mild cardiomegaly. CT Head WO Contrast   Final Result   No acute intracranial abnormality. XR CHEST PORTABLE   Final Result   Endotracheal tube and orogastric tube are acceptable. Bibasilar opacities are again noted. XR CHEST PORTABLE   Final Result   Reticulation at the left base more than right is nonspecific. Could relate   to subsegmental atelectasis, aspiration pneumonitis, or early pneumonia. US GUIDED PARACENTESIS    (Results Pending)   IR US GUIDED PARACENTESIS    (Results Pending)           Assessment/Plan :      1. Hyponatremia   Improved   Continue to monitor     2. Hypotension/ shock state   On iv fluids   Give albumin as needed       3. CHAO 2/2 ATN from hypotension / shock     Serum cr high but stable    Recommend to dose adjust all medications  based on renal functions  Maintain SBP> 90 mmHg   Daily weights   AVOID NSAIDs  Avoid Nephrotoxins  Monitor Intake/Output  Call if significant decrease in urine output     4. HIgh anion gap acidosis     Resolved     5. Hypokalemia - replace iv prn    6. Hepatic failure.  Encephalopathy   GI following   S/p  FFP  On PPI                     Thank you for allowing us to participate in care of Teresa Barrow         Electronically signed by: Leila Pagan MD, 9/12/2021, 2:34 PM      [facility] [address]  Office : [phone]  Fax :[phone]

## 2021-09-13 NOTE — PLAN OF CARE
Problem: Non-Violent Restraints  Goal: Removal from restraints as soon as assessed to be safe  9/13/2021 0933 by Jose F Burns RN  Outcome: Ongoing  9/13/2021 0110 by Eugene Zheng RN  Outcome: Ongoing  Goal: No harm/injury to patient while restraints in use  9/13/2021 0933 by Jose F Burns RN  Outcome: Ongoing  9/13/2021 0110 by Eugene Zheng RN  Outcome: Ongoing  Goal: Patient's dignity will be maintained  9/13/2021 0933 by Jose F Burns RN  Outcome: Ongoing  9/13/2021 0110 by Eugene Zheng RN  Outcome: Ongoing     Problem: Nutrition  Goal: Optimal nutrition therapy  9/13/2021 0933 by Jose F Burns RN  Outcome: Ongoing  9/13/2021 0110 by Eugene Zheng RN  Outcome: Not Met This Shift     Problem: Skin Integrity:  Goal: Will show no infection signs and symptoms  Description: Will show no infection signs and symptoms  9/13/2021 0933 by Jose F Burns RN  Outcome: Ongoing  9/13/2021 0110 by Eugene Zheng RN  Outcome: Ongoing  Goal: Absence of new skin breakdown  Description: Absence of new skin breakdown  9/13/2021 0933 by Jose F Burns RN  Outcome: Ongoing  9/13/2021 0110 by Eugene Zheng RN  Outcome: Ongoing     Problem: Falls - Risk of:  Goal: Will remain free from falls  Description: Will remain free from falls  9/13/2021 0933 by Jose F Burns RN  Outcome: Ongoing  9/13/2021 0110 by Eugene Zheng RN  Outcome: Ongoing  Goal: Absence of physical injury  Description: Absence of physical injury  9/13/2021 0933 by Jose F Burns RN  Outcome: Ongoing  9/13/2021 0110 by Eugene Zheng RN  Outcome: Ongoing     Problem: Infection:  Goal: Will remain free from infection  Description: Will remain free from infection  9/13/2021 0933 by Jose F Burns RN  Outcome: Ongoing  9/13/2021 0110 by Eugene Zheng RN  Outcome: Ongoing     Problem: Safety:  Goal: Free from accidental physical injury  Description: Free from accidental physical injury  9/13/2021 0933 by Jose F Burns RN  Outcome: Ongoing  9/13/2021 0110 by Kerry Antony Schoenig, RN  Outcome: Ongoing  Goal: Free from intentional harm  Description: Free from intentional harm  9/13/2021 0933 by Justus Evans RN  Outcome: Ongoing  9/13/2021 0110 by Deshawn Bhakta RN  Outcome: Ongoing     Problem: Daily Care:  Goal: Daily care needs are met  Description: Daily care needs are met  Outcome: Ongoing     Problem: Pain:  Goal: Patient's pain/discomfort is manageable  Description: Patient's pain/discomfort is manageable  9/13/2021 0933 by Justus Evans RN  Outcome: Ongoing  9/13/2021 0110 by Deshawn Bhakta RN  Outcome: Ongoing  Goal: Pain level will decrease  Description: Pain level will decrease  9/13/2021 0933 by Justus Evans RN  Outcome: Ongoing  9/13/2021 0110 by Deshawn Bhakta RN  Outcome: Ongoing  Goal: Control of acute pain  Description: Control of acute pain  9/13/2021 0933 by Justus Evans RN  Outcome: Ongoing  9/13/2021 0110 by Deshawn Bhakta RN  Outcome: Ongoing  Goal: Control of chronic pain  Description: Control of chronic pain  9/13/2021 0933 by Justus Evans RN  Outcome: Ongoing  9/13/2021 0110 by Deshawn Bhakat RN  Outcome: Ongoing     Problem: Skin Integrity:  Goal: Skin integrity will stabilize  Description: Skin integrity will stabilize  9/13/2021 0933 by Justus Evans RN  Outcome: Ongoing  9/13/2021 0110 by Deshawn Bhakta RN  Outcome: Ongoing     Problem: Discharge Planning:  Goal: Patients continuum of care needs are met  Description: Patients continuum of care needs are met  Outcome: Ongoing

## 2021-09-13 NOTE — PROGRESS NOTES
09/13/21 0849   Vent Patient Data   Peak Inspiratory Pressure 19 cmH2O   Mean Airway Pressure 8.5 cmH20   Rate Measured 6 br/min   Vt Exhaled 957 mL   Minute Volume 7.93 Liters   I:E Ratio 1:3.1   Plateau Pressure 18 MBS47   Static Compliance 73.62 mL/cmH2O   Dynamic Compliance 68.35 mL/cmH2O   Total PEEP 5 cmH20   Auto PEEP 0 cmH20

## 2021-09-13 NOTE — PROGRESS NOTES
Hospitalist Progress Note      PCP: No primary care provider on file. Date of Admission: 9/9/2021    Chief Complaint:     Altered Mental Status       pt brought to ed by [facility] after being found by family sitting in chair unresponsive but jill bs for squad was 79 1 amp given in route patient moaning at triage           Hospital Course:      No family at bedside to provide collateral history. History obtained from discussion with ER provider  Apparently file still being managed and has presented by different names in the past     The patient is a unknown y.o. adult with history of cirrhosis who apparently presented with family having been found by family poorly responsive who initiated CPR. EMS was called and on arrival to the ER patient was intubated. Post intubation he developed hypotension requiring fluid resuscitation with some improvement. Laboratory work-up was noted for ammonia of 136, lipase of 166, troponin of 0.10, sodium 122, potassium 2.9, CO2 of 18, creatinine of 2.0 and transaminitis with ,   CT chest abdomen pelvis revealed cirrhosis with portal hypertension and diffuse gallbladder thickening concerning for acute versus chronic cholecystitis with gallbladder sludge  CT brain with no acute intracranial pathology  EKG sinus rhythm  Urinalysis negative  He is notably jaundiced    Subjective: he is vented sedated.       Medications:  Reviewed    Infusion Medications    sodium chloride      octreotide (SANDOSTATIN) infusion 25 mcg/hr (09/13/21 0232)    sodium chloride Stopped (09/13/21 1531)    midazolam 4 mg/hr (09/13/21 1617)    norepinephrine 13 mcg/min (09/13/21 1535)    pantoprozole (PROTONIX) infusion 8 mg/hr (09/13/21 1535)    fentaNYL 125 mcg/hr (09/13/21 1940)     Scheduled Medications    albumin human  25 g IntraVENous Q6H    sodium chloride flush  5-40 mL IntraVENous 2 times per day    ipratropium-albuterol  1 ampule Inhalation 4x daily    rifaximin  550 mg Per NG tube BID    lactulose  20 g Per NG tube 6 times per day    piperacillin-tazobactam  3,375 mg IntraVENous Q8H    Empty Capsule #00 Purple/White  1 each Does not apply Prior to discharge     PRN Meds: sodium chloride, HYDROmorphone, HYDROmorphone, fentanNYL, fentanNYL, HYDROcodone 5 mg - acetaminophen, ondansetron, promethazine, sodium chloride flush, sodium chloride, ondansetron **OR** ondansetron, acetaminophen **OR** acetaminophen, potassium chloride, potassium chloride      Intake/Output Summary (Last 24 hours) at 9/13/2021 1636  Last data filed at 9/13/2021 1535  Gross per 24 hour   Intake 2063.04 ml   Output 277 ml   Net 1786.04 ml       Physical Exam Performed:    BP (!) 105/55   Pulse 76   Temp 97.6 °F (36.4 °C) (Temporal)   Resp 13   Ht 5' 5\" (1.651 m)   Wt 190 lb 14.7 oz (86.6 kg)   SpO2 100%   BMI 31.77 kg/m²     General appearance: visibly jaundiced. Appears to be in his 35s or 40s  HEENT: Pupils equal, round, and reactive to light. Has scleral icterus. Neck: Supple, with full range of motion. No jugular venous distention. Trachea midline. Respiratory:  Vented. . Clear to auscultation, bilaterally without Rales/Wheezes/Rhonchi. Cardiovascular: Regular rate and rhythm with normal S1/S2 without murmurs, rubs or gallops. Abdomen: Soft,  mildlyy distended. BS +   Musculoskeletal: No clubbing, cyanosis or edema bilaterally. Full range of motion without deformity. Skin: Skin color, texture, turgor normal.  No rashes or lesions. Neurologic:  Moving all extremities.   Cranial nerves: II-XII intact, grossly non-focal.  Psychiatric: sedatedCapillary Refill: Brisk,3 seconds, normal   Peripheral Pulses: +2 palpable, equal bilaterally       Labs:   Recent Labs     09/11/21  0900 09/11/21  1255 09/12/21  0530 09/12/21  0530 09/12/21  2139 09/13/21  0501 09/13/21  1430   WBC 27.8*  --  23.0*  --   --  18.8*  --    HGB 7.8*   < > 7.9*   < > 7.3* 6.9* 7.6*   HCT 22.5*   < > 22.5* < > 20.9* 19.3* 21.9*   PLT 86*  --  80*  --   --  69*  --     < > = values in this interval not displayed. Recent Labs     09/11/21  0440 09/11/21  1255 09/11/21  2205 09/12/21  0050 09/12/21  0530 09/12/21  1500 09/13/21  0028 09/13/21  0501 09/13/21  1430   *   < >  --    < > 133*   < > 133* 136 135*   K 2.0*   < > 2.8*  --  3.7  --   --  3.5  --    CL 93*  --   --   --  98*  --   --  99  --    CO2 21  --   --   --  21  --   --  21  --    BUN 55*  --   --   --  54*  --   --  58*  --    CREATININE 2.2*  --   --   --  2.0*  --   --  3.2*  --    CALCIUM 6.9*  --   --   --  7.5*  --   --  7.8*  --    PHOS 5.1*  --   --   --  4.4  --   --  4.7  --     < > = values in this interval not displayed. Recent Labs     09/11/21 0440 09/12/21  0530 09/13/21  0501   * 258* 215*   * 183* 153*   BILITOT 25.5* 27.2* 28.2*   ALKPHOS 192* 217* 202*     Recent Labs     09/11/21  0900 09/12/21  0530 09/13/21  1445   INR 3.01* 2.85* 2.76*     Recent Labs     09/11/21  0440 09/12/21  0530 09/13/21  0501   CKTOTAL 181 139 115       Urinalysis:      Lab Results   Component Value Date    NITRU Negative 09/09/2021    WBCUA 0-2 09/09/2021    BACTERIA 1+ 09/09/2021    RBCUA 3-4 09/09/2021    BLOODU TRACE-LYSED 09/09/2021    SPECGRAV 1.020 09/09/2021    GLUCOSEU 100 09/09/2021       Radiology:  IR US GUIDED PARACENTESIS   Final Result   Successful ultrasound guided paracentesis. US GALLBLADDER RUQ   Final Result   Gallbladder wall is thickened. This is a nonspecific finding in the presence   of ascites as it may simply be reactive, rather than secondary to underlying   cholecystitis      Low level echoes are seen within the gallbladder, either stones or sludge. Cirrhosis with moderate ascites         XR CHEST PORTABLE   Final Result   New central venous catheter as above, approach is unclear. CT ABDOMEN PELVIS WO CONTRAST Additional Contrast? None   Final Result   1.  Hepatic cirrhosis with associated portal venous hypertension and scattered   moderate intraperitoneal free fluid. 2. Diffuse gallbladder wall thickening suggesting association with chronic   versus acute cholecystitis. 3. Suspected gallbladder lumen dependent radiodense sludge. 4. Mild cardiomegaly. CT CHEST WO CONTRAST   Final Result   1. Hepatic cirrhosis with associated portal venous hypertension and scattered   moderate intraperitoneal free fluid. 2. Diffuse gallbladder wall thickening suggesting association with chronic   versus acute cholecystitis. 3. Suspected gallbladder lumen dependent radiodense sludge. 4. Mild cardiomegaly. CT Head WO Contrast   Final Result   No acute intracranial abnormality. XR CHEST PORTABLE   Final Result   Endotracheal tube and orogastric tube are acceptable. Bibasilar opacities are again noted. XR CHEST PORTABLE   Final Result   Reticulation at the left base more than right is nonspecific. Could relate   to subsegmental atelectasis, aspiration pneumonitis, or early pneumonia. Assessment/Plan:    Active Hospital Problems    Diagnosis     Hepatic encephalopathy ([address] [K72.90]     Hyperammonemia ([address] [E72.20]     Elevated lactic acid level [K78.15]     Metabolic acidosis, increased anion gap (IAG) [E87.2]     Elevated troponin [R77.8]     Hypokalemia [E87.6]     Hyponatremia [E87.1]     Hypotension [I95.9]     Anemia [D64.9]     GI bleed [K92.2]     Transaminitis [R74.01]        Acute respiratory failure  -Secondary to hepatic encephalopathy and ascites. -Patient was poorly responsive and not able to protect his airway and was intubated for this reason. Oxygenation was okay  -He remains intubated and sedated    Hepatic encephalopathy  -Appears to be end-stage cirrhosis at this time or at minimum decompensated cirrhosis. -Ammonia level is elevated and the patient has been started on lactulose.  Additionally the patient has been started on rifaximin  -We will follow ammonia levels    Elevated liver functions and jaundice  -Appears to be related to alcoholic cirrhosis  -Unclear if the patient has any hepatitis history. -GI is following    Lactic acidosis  -Appears to be secondary to sepsis however no clear source of infection at this time patient is covered on broad-spectrum antibiotics  -He may have a possible spontaneous bacterial peritonitis  -GI is consulted  -He is receiving IV fluids    Acute metabolic acidosis  -Appears to be a combination of acute kidney injury possible sepsis lactic acidosis and decompensated cirrhosis.  -We are working to correct the patient's abnormalities  -Nephrology is following as well    Acute kidney injury  -Secondary to all of the above  -Nephrology is following as noted above  -Continue to monitor renal function    Anemia with possible GI bleed  -Patient has been started on octreotide  -He is on PPI as well  -Gastroenterology has been consulted for his cirrhosis as well as for the possible bleed. -GI has determined that this is secondary to hemorrhoids General surgery was consulted it would be more dangerous to do a surgery for the hemorrhoid then it would be to just allow it to ooze a little bit. Banding would also be more dangerous than allowing it to do some.     Thrombocytopenia  -Secondary to liver disease    DVT Prophylaxis: SCDs  Diet: Diet NPO  Code Status: Full Code    PT/OT Eval Status: When appropriate    Dispo -patient is critically ill medical decision making is high  35 minutes of critical care time spent    Chayito Mcgovern MD

## 2021-09-13 NOTE — PROGRESS NOTES
09/13/21 0849   Vent Information   $Ventilation $Subsequent Day   Vent Type 980   Vent Mode AC/VC   Vt Ordered 480 mL   Rate Set 14 bmp   Peak Flow 50 L/min   FiO2  35 %   SpO2 100 %   SpO2/FiO2 ratio 285.71   Sensitivity 3   PEEP/CPAP 5   Humidification Source HME   Vent Patient Data   Peak Inspiratory Pressure 19 cmH2O   Mean Airway Pressure 8.5 cmH20   Rate Measured 6 br/min   Vt Exhaled 957 mL   Minute Volume 7.93 Liters   I:E Ratio 1:3.1   Plateau Pressure 18 [id number]   Static Compliance 73.62 mL/cmH2O   Dynamic Compliance 68.35 mL/cmH2O   Total PEEP 5 cmH20   Auto PEEP 0 cmH20   Cough/Sputum   Sputum How Obtained Endotracheal   Sputum Amount Moderate   Sputum Color Green   Tenacity Thick   Spontaneous Breathing Trial (SBT) RT Doc   Pulse 66   Breath Sounds   Right Upper Lobe Diminished   Right Middle Lobe Diminished   Right Lower Lobe Diminished   Left Upper Lobe Diminished   Left Lower Lobe Diminished   Additional Respiratory  Assessments   Resp 14   End Tidal CO2 36 (%)   Position Semi-Blackmon's   Oral Care Completed? Yes   Oral Care Mouth suctioned;Mouth moisturizer;Mouth swabbed;Lip moisturizer applied   Subglottic Suction Done?  Yes   Alarm Settings   High Pressure Alarm 40 cmH2O   Low Minute Volume Alarm 2 L/min   Apnea (secs) 20 secs   High Respiratory Rate 40 br/min   Low Exhaled Vt  200 mL   Patient Observation   Observations Day 5   ETT (adult)   Placement Date/Time: 09/09/21 2229   Preoxygenation: Yes  Type: Cuffed  Tube Size: 8 mm  Laryngoscope: GlideScope  Location: Oral  Insertion attempts: 1  Secured at: 24 cm  Placed By: In ED  Measured From: Lips   Secured at 25 cm   Measured From Lips   ET Placement Right   Secured By Commercial tube joiner   Site Condition Dry

## 2021-09-13 NOTE — PLAN OF CARE
Problem: Falls - Risk of:  Goal: Will remain free from falls  Description: Will remain free from falls  Outcome: Ongoing     Problem: Falls - Risk of:  Goal: Absence of physical injury  Description: Absence of physical injury  Outcome: Ongoing     Problem: Safety:  Goal: Free from accidental physical injury  Description: Free from accidental physical injury  Outcome: Ongoing     Problem: Safety:  Goal: Free from intentional harm  Description: Free from intentional harm  Outcome: Ongoing  Bed in lowest position with wheels locked. Alarm activated. 3/4 side rails up. Non-skid socks on. Restraints in place for safety. Hourly checks. Problem: Skin Integrity:  Goal: Will show no infection signs and symptoms  Description: Will show no infection signs and symptoms  Outcome: Ongoing     Problem: Skin Integrity:  Goal: Absence of new skin breakdown  Description: Absence of new skin breakdown  Outcome: Ongoing     Problem: Infection:  Goal: Will remain free from infection  Description: Will remain free from infection  Outcome: Ongoing     Problem: Skin Integrity:  Goal: Skin integrity will stabilize  Description: Skin integrity will stabilize  Outcome: Ongoing   Repositioned with pillow support q 2 hrs for comfort and the prevention of pressure ulcer formation. Problem: Non-Violent Restraints  Goal: Removal from restraints as soon as assessed to be safe  Outcome: Ongoing     Problem: Non-Violent Restraints  Goal: No harm/injury to patient while restraints in use  Outcome: Ongoing     Problem: Non-Violent Restraints  Goal: Patient's dignity will be maintained  Outcome: Ongoing  Restraints in place for the safety of the patient, his lines, and his tubing. Passive ROM performed on all four extremities. Hourly checks. No evidence of pain or distress. 0/10 pain on CPOT scale. Problem: Nutrition  Goal: Optimal nutrition therapy  Outcome: Not Met This Shift  NPO.         Problem: Pain:  Goal: Patient's pain/discomfort is manageable  Description: Patient's pain/discomfort is manageable  Outcome: Ongoing     Problem: Pain:  Goal: Pain level will decrease  Description: Pain level will decrease  Outcome: Ongoing     Problem: Pain:  Goal: Control of acute pain  Description: Control of acute pain  Outcome: Ongoing     Problem: Pain:  Goal: Control of chronic pain  Description: Control of chronic pain  Outcome: Ongoing  Continuous pain medication infusing per order. See MAR. 0/10 pain on CPOT scale.

## 2021-09-13 NOTE — PROGRESS NOTES
Assessment complete. VSS. Remains intubated and on ventilator. ETT #8, 25 cm at the lip. Settings: AC/VC. Rate 14. . FiO2 35%. PEEP 5. OGT in place at 57 cm to the lip and clamped. PERRLA. Responds to voice. RASS -1. Diminished lung sounds. NSR per monitor. Abdomen distended and hard. Active BS in all quadrants. Skin is jaundiced, swollen and weeping. Sclera jaundiced and edematous. Passive ROM completed. Restraints in place for patient safety. Repositioned in bed for comfort and skin breakdown prevention.

## 2021-09-13 NOTE — PLAN OF CARE
Nutrition Problem #1: Inadequate oral intake  Intervention: Food and/or Nutrient Delivery: Continue NPO (EN support as appropriate per MD)  Nutritional Goals: start of nutrition within next 24 - 48 hr

## 2021-09-13 NOTE — PROGRESS NOTES
Comprehensive Nutrition Assessment    Type and Reason for Visit:  Reassess    Nutrition Recommendations/Plan:   1. Recommend EN support to begin as appropriate per MD  2. If EN is desired, recommend Osmolite 1.2 (standard formula without fiber) at goal rate 70 mL per hour to provide 1680 mL total volume, 2016 calories, 93 grams protein, 1356 mL free water. 3. Recommend water bolus 165 mL q 6 hours. 4. Ensure head of bed is 30 - 45 degrees during continuous or bolus gastric feeding and for one hour after bolus. Turn off the feeding if head of bed is lowered less than 30 degrees. 5. Monitor for tolerance (bowel habits, N/V, cramping, abdominal exam findings: distended, firm, tense, guarded, discomfort). Nutrition Assessment:  Pt remains intubated and sedated with versed and fentanyl. Levophed at 11 mcg/min. Pt's nutrition status is declining as evidenced by NPO x 3 days during admission. GI to do flex sig and possible EGD today to assess brbpr or varices. Pt with significant abdominal distension; planning for paracentesis this week. Recommend EN to begin as appropriate per GI depending on results of scope today. Malnutrition Assessment:  Malnutrition Status:  Insufficient data      Estimated Daily Nutrient Needs:  Energy (kcal):  1920 - 2250; Weight Used for Energy Requirements:  Admission (75 kg)     Protein (g):  90 - 150; Weight Used for Protein Requirements:  Current (1.2 - 2.0g/75kg)        Fluid (ml/day):   ; Method Used for Fluid Requirements:  1 ml/kcal      Nutrition Related Findings:  Non-pitting BUE/BLE edema. LBM 9/13. +11.9 liters. Abdominal distension. Jaundice.       Wounds:  None       Current Nutrition Therapies:    Diet NPO    Anthropometric Measures:  · Height: 5' 5\" (165.1 cm)  · Current Body Weight: 190 lb 14.7 oz (86.6 kg)   · Admission Body Weight: 164 lb 10.9 oz (74.7 kg)    · Ideal Body Weight: 136 lbs; % Ideal Body Weight 140.4 %   · BMI: 31.8  · BMI Categories: Obese Class 1 (BMI 30.0-34. 9)       Nutrition Diagnosis:   · Inadequate oral intake related to impaired respiratory function as evidenced by intubation      Nutrition Interventions:   Food and/or Nutrient Delivery:  Continue NPO (EN support as appropriate per MD)  Nutrition Education/Counseling:  Education not indicated   Coordination of Nutrition Care:  Continue to monitor while inpatient    Goals:  start of nutrition within next 24 - 48 hr       Nutrition Monitoring and Evaluation:   Behavioral-Environmental Outcomes:  None Identified   Food/Nutrient Intake Outcomes:  Enteral Nutrition Intake/Tolerance  Physical Signs/Symptoms Outcomes:  Biochemical Data, Hemodynamic Status, GI Status     Discharge Planning:     Too soon to determine     Electronically signed by Nate Reid RD, LD on 9/13/21 at 10:29 AM EDT    Contact: [phone]

## 2021-09-13 NOTE — PROGRESS NOTES
[phone] Hospital or Facility: [facility] ICU  From: Tevin Mae   RE: Denisha Seay RM: [id number] Leon originally sent to [facility]. Hepatic enceph. Intubated in ED. Plan for EGD and Colonoscopy today for bleed. Critical lab value: Hemoglobin 6.9. Please advise.

## 2021-09-13 NOTE — CARE COORDINATION
Discharge Planning Initial Assessment        I have reviewed the patient's medical history in detail and updated the computerized patient record. I talked with the patient / family member, pt's brother [de-identified] . Patient was  independent prior to admission. There are no needs identified at this time. Case Management will continue to follow the pt to help with any DC needs that may arise. Please notify / Discharge Planner if any needs are identified.     DMEs: none    Transport: pt's brother    Price Madden RN Case Manager

## 2021-09-13 NOTE — PROGRESS NOTES
[facility]        Progress Note    Pt Name: Lázaro Hunter  MRN: [id number]  YOB: 1965  Date of evaluation: 9/13/2021    Chief Complaint: GI bleed      Subjective:     Intubated, sedated, stable on vent  Pressors, unchanging  Intermittent bleeding per rectum    Vital Signs:  Patient Vitals for the past 24 hrs:   BP Temp Temp src Pulse Resp SpO2 Height Weight   09/13/21 1148    66 13 100 %     09/13/21 1057 88/65   75 24 100 %     09/13/21 1054 90/65   74 24 100 %     09/13/21 1051 112/77   73 14 100 %     09/13/21 1045 93/62   75 20 100 %     09/13/21 1039 105/72   75 8 100 %     09/13/21 1036 101/62   75 14 100 %     09/13/21 1033 100/61   79 14 100 % 5' 5\" (1.651 m)    09/13/21 1030 105/64   85 16 100 %     09/13/21 1003 104/71   81 17 100 %     09/13/21 1000 104/71 98 °F (36.7 °C) Temporal 77 27 99 %     09/13/21 0900 (!) 103/56 98.1 °F (36.7 °C) Temporal 68 15 100 %     09/13/21 0849    66 14 100 %     09/13/21 0800 (!) 104/58 97.9 °F (36.6 °C) Temporal 67 15      09/13/21 0659 (!) 100/55 96.6 °F (35.9 °C) Temporal 66 14 100 %     09/13/21 0644 (!) 100/55 96.9 °F (36.1 °C) Temporal 66 16 100 %     09/13/21 0640 (!) 100/55 96.9 °F (36.1 °C) Temporal 68 15 100 %     09/13/21 0600 (!) 94/54   69 16 100 %     09/13/21 0500 (!) 95/50   66 17 100 %  190 lb 14.7 oz (86.6 kg)   09/13/21 0400 (!) 95/57 98 °F (36.7 °C) Temporal 65 15 100 %     09/13/21 0300 (!) 101/58   70 13 100 %     09/13/21 0200 (!) 96/57   69 15 100 %     09/13/21 0100 (!) 94/59   69 13 100 %     09/13/21 0000 (!) 88/53 97.8 °F (36.6 °C) Temporal 75 13 100 %     09/12/21 2357    76       09/12/21 2300 (!) 92/53   79 13 100 %     09/12/21 2200 102/66   81 14 98 %     09/12/21 2100 109/69   79 19 100 %     09/12/21 2046      100 %     09/12/21 2036    79 14 100 %     09/12/21 2000 96/60 97.5 °F (36.4 °C) Temporal 76 14 100 %     21 1900 104/61   77 13 100 %     21 1800 (!) 103/58 98.1 °F (36.7 °C) Temporal 79 14 100 %     21 1600 (!) 91/58 98.6 °F (37 °C) Temporal 79 14 100 %     21 1546    81 15 100 %        TEMPERATURE HISTORY 24H: Temp (24hrs), Av.7 °F (36.5 °C), Min:96.6 °F (35.9 °C), Max:98.6 °F (37 °C)    BLOOD PRESSURE HISTORY: Systolic (90HRX), FSP:83 , Min:88 , NLE:610    Diastolic (09DWZ), RZE:72, Min:50, Max:77      Intake/Output:    I/O last 3 completed shifts: In: 1383 [I.V.:939.3; IV Piggyback:443.7]  Out: 152 [Urine:152]  I/O this shift:  In: 313 [Blood:313]  Out: -   Drain/tube Output:           Physical Exam:  General: sedated, on vent, grimaces to pain but does not follow commands  Cardiac: regular rate and rhythm   Pulmonary: clear to auscultation bilaterally   Abdomen: soft, non-distended, difficult to assess tenderness  Rectal: soft circumferential hemorrhoidal disease, no masses, difficult to palpate anal verge/rectal masses, small blood/stool in rectal vault    Labs:  CBC:    Recent Labs     21  0900 21  1255 21  0530 21  2139 21  0501   WBC 27.8*  --  23.0*  --  18.8*   HGB 7.8*   < > 7.9* 7.3* 6.9*   HCT 22.5*   < > 22.5* 20.9* 19.3*   PLT 86*  --  80*  --  69*    < > = values in this interval not displayed. BMP:    Recent Labs     21  0440 21  1255 21  2205 21  0050 21  0530 21  1500 21  1845 21  0028 21  0501   *   < >  --    < > 133*   < > 133* 133* 136   K 2.0*   < > 2.8*  --  3.7  --   --   --  3.5   CL 93*  --   --   --  98*  --   --   --  99   CO2 21  --   --   --  21  --   --   --  21   BUN 55*  --   --   --  54*  --   --   --  58*   CREATININE 2.2*  --   --   --  2.0*  --   --   --  3.2*   GLUCOSE 110*  --   --   --  85  --   --   --  99    < > = values in this interval not displayed.      Hepatic:   Recent Labs 09/11/21 0440 09/12/21  0530 09/13/21  0501   * 258* 215*   * 183* 153*   BILITOT 25.5* 27.2* 28.2*   ALKPHOS 192* 217* 202*     Amylase: No results for input(s): AMYLASE in the last 72 hours. Lipase: No results for input(s): LIPASE in the last 72 hours. Mag:      Recent Labs     09/11/21  0440 09/12/21  0530 09/13/21  0501   MG 2.10 2.20 2.10      Phos:     Recent Labs     09/11/21  0440 09/12/21  0530 09/13/21  0501   PHOS 5.1* 4.4 4.7      Coags:   Recent Labs     09/10/21  2100 09/11/21  0900 09/12/21  0530   INR 2.28* 3.01* 2.85*       Cultures:  Relevant cultures documented under results section     Pathology:   No relevant pathology     Imaging:  I have personally reviewed the following films:    CT ABDOMEN PELVIS WO CONTRAST Additional Contrast? None    Result Date: 9/10/2021  EXAMINATION: CT OF THE CHEST WITHOUT CONTRAST; CT OF THE ABDOMEN AND PELVIS WITHOUT CONTRAST 9/10/2021 12:06 am; 9/10/2021 12:07 am TECHNIQUE: CT of the chest was performed without the administration of intravenous contrast. Multiplanar reformatted images are provided for review. Dose modulation, iterative reconstruction, and/or weight based adjustment of the mA/kV was utilized to reduce the radiation dose to as low as reasonably achievable.; CT of the abdomen and pelvis was performed without the administration of intravenous contrast. Multiplanar reformatted images are provided for review. Dose modulation, iterative reconstruction, and/or weight based adjustment of the mA/kV was utilized to reduce the radiation dose to as low as reasonably achievable. COMPARISON: None. HISTORY: ORDERING SYSTEM PROVIDED HISTORY: AMS TECHNOLOGIST PROVIDED HISTORY: Reason for exam:->AMS Decision Support Exception - unselect if not a suspected or confirmed emergency medical condition->Emergency Medical Condition (MA) Reason for Exam: AMS Acuity: Acute Type of Exam: Initial Relevant Medical/Surgical History:  Altered Mental Status (pt brought to ed by Cosmo Ardon after being found by family sitting in chair unresponsive but breaching, BS for squad was 79 1 amp given in route patient moaning at triage ); ORDERING SYSTEM PROVIDED HISTORY: AMS, jaundice TECHNOLOGIST PROVIDED HISTORY: Reason for exam:->AMS, jaundice Additional Contrast?->None Decision Support Exception - unselect if not a suspected or confirmed emergency medical condition->Emergency Medical Condition (MA) Reason for Exam: AMS, jaundice Acuity: Acute Type of Exam: Initial Relevant Medical/Surgical History: Altered Mental Status (pt brought to ed by [place] [facility] after being found by family sitting in chair unresponsive but breaching, BS for squad was 79 1 amp given in route patient moaning at triage ) FINDINGS: Chest: Mediastinum: The heart is normal in size and configuration. No evidence of pericardial effusion is seen. No evidence of mediastinal or hilar lymphadenopathy or mass lesion is identified. Lungs/pleura: The lungs are well aerated without evidence of consolidation. The pleural surfaces are unremarkable without evidence of pleural thickening or pleural effusion identified. The heart is mildly enlarged. Soft Tissues/Bones: The bones, skeletal muscle bundles, fascial planes and subcutaneous soft tissues are unremarkable in appearance. Abdomen/Pelvis: Organs: Nodular contour of the liver is present. Associated enlargement of the main portal vein is seen which measures up to 15 mm in transluminal diameter. Diffuse gallbladder wall thickening is identified which measures up to 7 mm. Dependent sludge is seen within the gallbladder lumen. The liver, gallbladder, spleen, pancreas, adrenal glands, kidneys, are unremarkable in appearance. GI/Bowel: The stomach is unremarkable without wall thickening or distention. Bowel loops are unremarkable in appearance without evidence of obstruction, distension or mucosal thickening.  Pelvis: Urinary bladder is minimally distending in setting of Peterson catheter placement and is grossly unremarkable in appearance. No evidence of pelvic free fluid is seen. Peritoneum/Retroperitoneum: No evidence of retroperitoneal or intraperitoneal lymphadenopathy is identified. Scattered moderate intraperitoneal free fluid is visualized. . Bones/Soft Tissues: The bones, skeletal muscle bundles, fascial planes and subcutaneous soft tissues are unremarkable in appearance. 1. Hepatic cirrhosis with associated portal venous hypertension and scattered moderate intraperitoneal free fluid. 2. Diffuse gallbladder wall thickening suggesting association with chronic versus acute cholecystitis. 3. Suspected gallbladder lumen dependent radiodense sludge. 4. Mild cardiomegaly. CT Head WO Contrast    Result Date: 9/10/2021  EXAMINATION: CT OF THE HEAD WITHOUT CONTRAST  9/10/2021 12:05 am TECHNIQUE: CT of the head was performed without the administration of intravenous contrast. Dose modulation, iterative reconstruction, and/or weight based adjustment of the mA/kV was utilized to reduce the radiation dose to as low as reasonably achievable. COMPARISON: None. HISTORY: ORDERING SYSTEM PROVIDED HISTORY: Altered mental status TECHNOLOGIST PROVIDED HISTORY: Reason for exam:->Altered mental status Has a \"code stroke\" or \"stroke alert\" been called? ->No Decision Support Exception - unselect if not a suspected or confirmed emergency medical condition->Emergency Medical Condition (MA) Reason for Exam: Altered mental status Acuity: Acute Type of Exam: Initial Relevant Medical/Surgical History: Altered Mental Status (pt brought to ed by [place] [facility] after being found by family sitting in chair unresponsive but breahing, bs for squad was 79 1 amp given in route patient moaning at triage ) FINDINGS: BRAIN/VENTRICLES: There is no acute intracranial hemorrhage, mass effect or midline shift. No abnormal extra-axial fluid collection.   The gray-white differentiation is maintained without evidence of an acute infarct. There is no evidence of hydrocephalus. Atherosclerosis. ORBITS: The visualized portion of the orbits demonstrate no acute abnormality. SINUSES: Patient is intubated. Fluid in the nasopharynx. Small amount of scattered fluid in the paranasal sinuses. SOFT TISSUES/SKULL:  No acute abnormality of the visualized skull or soft tissues. No acute intracranial abnormality. CT CHEST WO CONTRAST    Result Date: 9/10/2021  EXAMINATION: CT OF THE CHEST WITHOUT CONTRAST; CT OF THE ABDOMEN AND PELVIS WITHOUT CONTRAST 9/10/2021 12:06 am; 9/10/2021 12:07 am TECHNIQUE: CT of the chest was performed without the administration of intravenous contrast. Multiplanar reformatted images are provided for review. Dose modulation, iterative reconstruction, and/or weight based adjustment of the mA/kV was utilized to reduce the radiation dose to as low as reasonably achievable.; CT of the abdomen and pelvis was performed without the administration of intravenous contrast. Multiplanar reformatted images are provided for review. Dose modulation, iterative reconstruction, and/or weight based adjustment of the mA/kV was utilized to reduce the radiation dose to as low as reasonably achievable. COMPARISON: None. HISTORY: ORDERING SYSTEM PROVIDED HISTORY: AMS TECHNOLOGIST PROVIDED HISTORY: Reason for exam:->AMS Decision Support Exception - unselect if not a suspected or confirmed emergency medical condition->Emergency Medical Condition (MA) Reason for Exam: AMS Acuity: Acute Type of Exam: Initial Relevant Medical/Surgical History:  Altered Mental Status (pt brought to ed by [place] [facility] after being found by family sitting in chair unresponsive but breaching, BS for squad was 79 1 amp given in route patient moaning at triage ); ORDERING SYSTEM PROVIDED HISTORY: AMS, jaundice TECHNOLOGIST PROVIDED HISTORY: Reason for exam:->AMS, jaundice Additional Contrast?->None Decision Support Exception - unselect if fascial planes and subcutaneous soft tissues are unremarkable in appearance. 1. Hepatic cirrhosis with associated portal venous hypertension and scattered moderate intraperitoneal free fluid. 2. Diffuse gallbladder wall thickening suggesting association with chronic versus acute cholecystitis. 3. Suspected gallbladder lumen dependent radiodense sludge. 4. Mild cardiomegaly. US GALLBLADDER RUQ    Result Date: 9/11/2021  EXAMINATION: RIGHT UPPER QUADRANT ULTRASOUND 9/11/2021 6:40 am COMPARISON: Recent CT scan HISTORY: ORDERING SYSTEM PROVIDED HISTORY: assess for acute cholecystitis TECHNOLOGIST PROVIDED HISTORY: Reason for exam:->assess for acute cholecystitis FINDINGS: LIVER:  Liver is heterogeneous in its echotexture, and nodular in appearance, compatible with cirrhosis. BILIARY SYSTEM:  Low level echoes are seen within the gallbladder, compatible with stones or sludge. Gallbladder wall is thickened measuring 9 mm. Common bile duct is within normal limits measuring meters. RIGHT KIDNEY: The right kidney is grossly unremarkable without evidence of hydronephrosis. PANCREAS:  Visualized portions of the pancreas are unremarkable. OTHER: Moderate ascites is seen. Limited images show flow in the portal vein     Gallbladder wall is thickened. This is a nonspecific finding in the presence of ascites as it may simply be reactive, rather than secondary to underlying cholecystitis Low level echoes are seen within the gallbladder, either stones or sludge. Cirrhosis with moderate ascites     XR CHEST PORTABLE    Result Date: 9/10/2021  EXAMINATION: ONE XRAY VIEW OF THE CHEST 9/10/2021 10:17 am COMPARISON: 9 HISTORY: ORDERING SYSTEM PROVIDED HISTORY: Placement of CVC TECHNOLOGIST PROVIDED HISTORY: Reason for exam:->Placement of CVC Reason for Exam: Placement of CVC Acuity: Unknown Type of Exam: Unknown FINDINGS: There is been interval placement of a right sided approach central venous catheter.   It is unclear if this enters the internal jugular or the subclavian vein, tip projects in the mid SVC. Tubes and lines are otherwise unchanged. There is no evidence of pneumothorax. New central venous catheter as above, approach is unclear. XR CHEST PORTABLE    Result Date: 9/10/2021  EXAMINATION: ONE XRAY VIEW OF THE CHEST 9/9/2021 11:34 pm COMPARISON: 09/09/2021 HISTORY: ORDERING SYSTEM PROVIDED HISTORY: post intubation and og tube TECHNOLOGIST PROVIDED HISTORY: Reason for exam:->post intubation and og tube Reason for Exam: post intubation and og tube Acuity: Unknown Type of Exam: Subsequent/Follow-up FINDINGS: Endotracheal tube is approximately 3 cm above the kyung. Orogastric tube courses into the stomach. External leads and tubing overlie the chest. The lungs are hypoinflated. Bibasilar opacities are noted. No pleural effusion or pneumothorax are demonstrated. Cardiac silhouette is not enlarged for the technique. The current pulmonary vasculature is likely exaggerated by the technique. Endotracheal tube and orogastric tube are acceptable. Bibasilar opacities are again noted. XR CHEST PORTABLE    Result Date: 9/9/2021  EXAMINATION: ONE XRAY VIEW OF THE CHEST 9/9/2021 10:00 pm COMPARISON: None with this ID. HISTORY: ORDERING SYSTEM PROVIDED HISTORY: Altered mental status TECHNOLOGIST PROVIDED HISTORY: Reason for exam:->Altered mental status FINDINGS: Reticulation at the left base more than the right. The upper lungs are clear. No pleural effusion or pneumothorax. Cardiac silhouette is not enlarged and no pulmonary vascular congestion. No acute fractures are identified. No lines or tubes within the chest.     Reticulation at the left base more than right is nonspecific. Could relate to subsegmental atelectasis, aspiration pneumonitis, or early pneumonia.        Scheduled Meds:   albumin human  25 g IntraVENous Q6H    sodium chloride flush  5-40 mL IntraVENous 2 times per day    ipratropium-albuterol  1 ampule Inhalation 4x daily    rifaximin  550 mg Per NG tube BID    lactulose  20 g Per NG tube 6 times per day    piperacillin-tazobactam  3,375 mg IntraVENous Q8H    Empty Capsule #00 Purple/White  1 each Does not apply Prior to discharge     Continuous Infusions:   sodium chloride      octreotide (SANDOSTATIN) infusion 25 mcg/hr (09/13/21 0232)    sodium chloride      midazolam 4 mg/hr (09/13/21 0916)    norepinephrine 11 mcg/min (09/13/21 0916)    pantoprozole (PROTONIX) infusion 8 mg/hr (09/13/21 0644)    fentaNYL 125 mcg/hr (09/13/21 0808)     PRN Meds:.sodium chloride, HYDROmorphone, HYDROmorphone, fentanNYL, fentanNYL, HYDROcodone 5 mg - acetaminophen, ondansetron, promethazine, sodium chloride flush, sodium chloride, ondansetron **OR** ondansetron, acetaminophen **OR** acetaminophen, potassium chloride, potassium chloride      Assessment:  Patient Active Problem List   Diagnosis    Hepatic encephalopathy (HCC)    Hyperammonemia (HCC)    Elevated lactic acid level    Metabolic acidosis, increased anion gap (IAG)    Elevated troponin    Hypokalemia    Hyponatremia    Hypotension    Anemia    GI bleed    Transaminitis     Hepatic encephalopathy  GI bleeding  Acute blood loss anemia  Alcoholic hepatic cirrhosis, portal venous hypertension  Leukocytosis  Hypotension, shock  Hyponatremia  Acute kidney injury     Plan:  1. Hemorrhoid present and may intermittently be cause of bleeding. However risks of hemorrhoidectomy far outweigh benefits as patient may bleed more significantly from surgical site than from hemorrhoid itself. Primary treatment for bleeding hemorrhoid in cirrhotic must center around control of portal hypertension, may require transfer to tertiary center   2. Coagulopathy including refractory INR and thrombocytopenia contributing to intermittent bleeding, continue to treat and transfuse as needed  3. IVF, wean pressors as able  4.  Overall prognosis remains grim given multitude and severity of medical comorbidities  5. Defer management of remainder of medical comorbidities to primary and consulting teams    Osmar Carvajal MD, FACS  9/13/2021  12:53 PM

## 2021-09-13 NOTE — PROGRESS NOTES
[facility]  Gastroenterology Progress Note    Asia Sheridan is a 55 y.o. male patient. 1. Hepatic encephalopathy ([address]    2. Gastrointestinal hemorrhage, unspecified gastrointestinal hemorrhage type    3. Thickening of wall of gallbladder    4. Liver failure with hepatic coma, unspecified chronicity (HCC)        SUBJECTIVE:  intubated    ROS:  Cardiovascular ROS: intubated  Gastrointestinal ROS: intubated  Respiratory ROS: intubated    Physical    VITALS:  BP 88/65   Pulse 75   Temp 98.1 °F (36.7 °C) (Temporal)   Resp 24   Ht 5' 5\" (1.651 m)   Wt 190 lb 14.7 oz (86.6 kg)   SpO2 100%   BMI 31.77 kg/m²   TEMPERATURE:  Current - Temp: 98.1 °F (36.7 °C); Max - Temp  Av.7 °F (36.5 °C)  Min: 96.6 °F (35.9 °C)  Max: 98.6 °F (37 °C)    NAD  Regular rate  Lungs CTA Bilaterally  Abdomen soft, stable mild distended, Bowel sounds present  Sedated      Data    CBC:   Lab Results   Component Value Date    WBC 18.8 2021    RBC 2.08 2021    HGB 6.9 2021    HCT 19.3 2021    MCV 92.9 2021    MCH 33.0 2021    MCHC 35.6 2021    RDW 17.6 2021    PLT 69 2021    MPV 9.8 2021     Hepatic Function Panel:    Lab Results   Component Value Date    ALKPHOS 202 2021     2021     2021    PROT 4.7 2021    BILITOT 28.2 2021           ASSESSMENT / PLAN :    Alcoholic hepatitis, cirrhosis -. He was initially unnamed but later found out that he came to [facility] 2 weeks go as Ian Turner  64 (not his real name and ; he in undocumented). He was discharged 2021 with severe alcoholic hepatitis with TB 28. He was discharged on prednisolone. In the ER, he was found to be hypotensive and required intubation  Bilirubin 27, , , INR 4. Ammonia 136. All concerning for liver failure. Undetectable acetaminophen, salicylate, and alcohol levels. viral hepatitis panel normal. 21 lft stable.    Hepatic encephalopathy -ammonia 136 at admit. On lactulose via ng. Ascites -moderate per CT. On Rocephin empirically for SBP. GI bleed -does not appear to have upper GI bleed at this time as OG lavage is yellow at admit and again 9/11/21 ng lavage yellow; rectal 9/11/21 smear red blood lgi source, hemorrhoid vs colitis, etc. 9/13/21 hbg 6.9, 1 u prbc given, per nurse prep only brown stool/not blood. 9/13 egd small nonbleeding varices and nonbleeding portal htn gastropathy, rec ppi and later hpylori stool and later b blocker. 9/13 colonoscopy with hemorrhoid source blood, 2 benign small polyps not removed. Anemia  - unclear what baseline hemoglobin is.  hbg 6.9 today, 1 u prbc given. Leukocytosis -WBC 43 at admit. c diff negative. On antibiotics. Improved. Hypotension -may be more from sepsis rather than GI bleed. Remains on pressor but lower dose.    CHAO: per renal; creat elevated today  Hyponatremia  Hypokalemia:  improved  Elevated inr:  stabalized around 2.8, today pending.      Recommendations:   - surg cs for hemorrhoid blood, see colonoscopy photo  - ok start tube feed from my standpoint  - u/s guided paracentesis eval for sbp (already on antbx)  - wean pressors and sedation as able  -Lactulose every 4 hours, Xifaxan 550 mg twice daily  - received vitamin K  - daily inr, lft, wbc, creat  -Monitor hemoglobin  -PPI IV twice daily  - etoh cessation key going forward  - hold off prednisolone with infectious issues      Nathan Choi MD , MD  [facility] and [facility]

## 2021-09-13 NOTE — PROGRESS NOTES
Patient restless and constantly alarming vent. Unable to be consoled. Versed started. See MAR for titration.

## 2021-09-13 NOTE — PROGRESS NOTES
LDAs: Patient is on mechanical ventilation. #8 ETT to 25 cm at the lip. Settings: AC 14 / 480 / fiO2 35% / PEEP 5. OGT to 57 cm at the lip and clamped. R triple lumen IJ CVC continuously infusing per order. See MAR. Peterson patent with orange urine. Assessment: Sedated. Patient responds to voice. RASS -2. Unable to follow commands. [place]i speaking only. Diminished breath sounds throughout. In NSR on monitor. Hypotensive on pressor support. Levophed order to titrate to SBP greater than 90. BP checks q 15 minutes. Abdomen round, soft, and distended with ascites. Active bowel sounds in all four quadrants. Skin is jaundice, but otherwise intact. Non-pitting edema in all extremities. Sclera also edematous and jaundice. Restraints in place for the safety of the patient, his lines, and his tubing. Passive ROM performed on all four extremities. Hourly checks. No evidence of pain or distress. 0/10 pain on CPOT scale. Repositioned for comfort and the prevention of pressure ulcer formation.

## 2021-09-13 NOTE — PROGRESS NOTES
Office : [phone]     Fax :[phone]       Nephrology  Progress Note      Patient's Name: Ro Pérez  9:58 AM  9/13/2021    Reason for Consult:  CHAO , Hyponatremia     Requesting Physician: Dr. Mollie Runner. Chief Complaint:    Chief Complaint   Patient presents with    Altered Mental Status     pt brought to ed by [place] [facility] after being found by family sitting in chair unresponsive but breahing, bs for squad was 79 1 amp given in route patient moaning at triage          History of Present iIlness:    Ro Pérez is a gentleman with unknown age possible history of cirrhosis who apparently presented with family having been found by family poorly responsive who initiated CPR. EMS was called and on arrival to the ER patient was intubated. Post intubation he developed hypotension requiring fluid resuscitation with some improvement. Laboratory work-up was noted for ammonia of 136, lipase of 166, troponin of 0.10, sodium 122, potassium 2.9, CO2 of 18, creatinine of 2.0 and transaminitis with ,   CT chest abdomen pelvis revealed cirrhosis with portal hypertension and diffuse gallbladder thickening concerning for acute versus chronic cholecystitis with gallbladder sludge  CT brain with no acute intracranial pathology  EKG sinus rhythm  Urinalysis negative  History has been gathered from medical records      Latest sodium level is 119. Interval hx       Intubated on vent support     On pressor support    BUN and creat remains elevated         I/O last 3 completed shifts: In: 6861 [I.V.:939.3; IV Piggyback:443.7]  Out: 152 [Urine:152]    No past medical history on file. History reviewed. No pertinent surgical history. No family history on file. wnl  Heent: Icterus +  Neck: no bruits or jvd noted  Cardiovascular:  S1, S2 without m/r/g  Respiratory: CTA B without w/r/r  Abdomen:  +bs, soft, nt, nd  Ext: no  lower extremity edema  Psychiatric: mood and affect appropriate  Musculoskeletal:  Rom, muscular strength intact    Labs:  CBC:   Recent Labs     09/11/21  0900 09/11/21  1255 09/12/21  0530 09/12/21  2139 09/13/21  0501   WBC 27.8*  --  23.0*  --  18.8*   HGB 7.8*   < > 7.9* 7.3* 6.9*   PLT 86*  --  80*  --  69*    < > = values in this interval not displayed. BMP:    Recent Labs     09/11/21 0440 09/11/21  1255 09/11/21 2205 09/12/21  0050 09/12/21  0530 09/12/21  1500 09/12/21  1845 09/13/21  0028 09/13/21  0501   *   < >  --    < > 133*   < > 133* 133* 136   K 2.0*   < > 2.8*  --  3.7  --   --   --  3.5   CL 93*  --   --   --  98*  --   --   --  99   CO2 21  --   --   --  21  --   --   --  21   BUN 55*  --   --   --  54*  --   --   --  58*   CREATININE 2.2*  --   --   --  2.0*  --   --   --  3.2*   GLUCOSE 110*  --   --   --  85  --   --   --  99    < > = values in this interval not displayed. Ca/Mg/Phos:   Recent Labs     09/11/21 0440 09/12/21  0530 09/13/21  0501   CALCIUM 6.9* 7.5* 7.8*   MG 2.10 2.20 2.10   PHOS 5.1* 4.4 4.7     Hepatic:   Recent Labs     09/11/21 0440 09/12/21  0530 09/13/21  0501   * 258* 215*   * 183* 153*   BILITOT 25.5* 27.2* 28.2*   ALKPHOS 192* 217* 202*     Troponin:   No results for input(s): TROPONINI in the last 72 hours. BNP: No results for input(s): BNP in the last 72 hours. Lipids: No results for input(s): CHOL, TRIG, HDL, LDLCALC, LABVLDL in the last 72 hours.   ABGs:   Recent Labs     09/13/21  0458   PHART 7.344*   PO2ART 171.0*   SBW5BIR 39.2     INR:   Recent Labs     09/10/21  2100 09/11/21  0900 09/12/21  0530   INR 2.28* 3.01* 2.85*     UA:  No results for input(s): [place] Cotton, GLUCOSEU, Marilynn Mccain, BLOODU, [address] [facility], PROTEINU, [address], NITRU, David Frames in the last 72 hours. Urine Microscopic:   No results for input(s): LABCAST, BACTERIA, COMU, HYALCAST, WBCUA, RBCUA, EPIU in the last 72 hours. Urine Culture: No results for input(s): LABURIN in the last 72 hours. Urine Chemistry:   Recent Labs     09/10/21  1115   NAUR <20                IMAGING:  US GALLBLADDER RUQ   Final Result   Gallbladder wall is thickened. This is a nonspecific finding in the presence   of ascites as it may simply be reactive, rather than secondary to underlying   cholecystitis      Low level echoes are seen within the gallbladder, either stones or sludge. Cirrhosis with moderate ascites         XR CHEST PORTABLE   Final Result   New central venous catheter as above, approach is unclear. CT ABDOMEN PELVIS WO CONTRAST Additional Contrast? None   Final Result   1. Hepatic cirrhosis with associated portal venous hypertension and scattered   moderate intraperitoneal free fluid. 2. Diffuse gallbladder wall thickening suggesting association with chronic   versus acute cholecystitis. 3. Suspected gallbladder lumen dependent radiodense sludge. 4. Mild cardiomegaly. CT CHEST WO CONTRAST   Final Result   1. Hepatic cirrhosis with associated portal venous hypertension and scattered   moderate intraperitoneal free fluid. 2. Diffuse gallbladder wall thickening suggesting association with chronic   versus acute cholecystitis. 3. Suspected gallbladder lumen dependent radiodense sludge. 4. Mild cardiomegaly. CT Head WO Contrast   Final Result   No acute intracranial abnormality. XR CHEST PORTABLE   Final Result   Endotracheal tube and orogastric tube are acceptable. Bibasilar opacities are again noted. XR CHEST PORTABLE   Final Result   Reticulation at the left base more than right is nonspecific. Could relate   to subsegmental atelectasis, aspiration pneumonitis, or early pneumonia.          IR US GUIDED PARACENTESIS    (Results Pending)           Assessment/Plan :      1. CHAO 2/2 ATN from hypotension / shock. Anemia   Transfuse PRBC   Recommend to dose adjust all medications  based on renal functions  Maintain SBP> 90 mmHg   Daily weights   AVOID NSAIDs  Avoid Nephrotoxins  Monitor Intake/Output  Call if significant decrease in urine output     2. Hyponatremia. Improved. Continue to monitor. 3.  Hypotension/ shock state   On levophed   Give albumin     4. High anion gap acidosis. Resolved. 5. Hypokalemia - replace iv prn.    6. Hepatic failure. Encephalopathy   GI following. S/p  FFP. On PPI. 7. Anemia. Hb is 6.9.    PRBC           Recommend to dose adjust all medications  based on renal functions  Maintain SBP> 90 mmHg   Daily weights   AVOID NSAIDs  Avoid Nephrotoxins  Monitor Intake/Output  Call if significant decrease in urine output               Thank you for allowing us to participate in care of Adriana Sapp         Electronically signed by: Deborah Hammodns MD, 9/13/2021, 9:58 AM      [facility] [address]  Office : [phone]  Fax :[phone]

## 2021-09-13 NOTE — PROGRESS NOTES
[facility] Pulmonary/CCM Progress note      Admit Date: 9/9/2021    Chief Complaint: Altered mental status    Subjective: Interval History: Patient remains critically ill. Still on full mechanical ventilatory support. Underwent EGD today and showed nonbleeding esophageal varices. Still requiring norepinephrine drip. Significant abdominal distention. Ammonia 75. Creatinine 2.0. Scheduled Meds:   albumin human  25 g IntraVENous Q6H    sodium chloride flush  5-40 mL IntraVENous 2 times per day    ipratropium-albuterol  1 ampule Inhalation 4x daily    rifaximin  550 mg Per NG tube BID    lactulose  20 g Per NG tube 6 times per day    piperacillin-tazobactam  3,375 mg IntraVENous Q8H    Empty Capsule #00 Purple/White  1 each Does not apply Prior to discharge     Continuous Infusions:   sodium chloride      octreotide (SANDOSTATIN) infusion 25 mcg/hr (09/13/21 0232)    sodium chloride Stopped (09/13/21 1531)    midazolam 4 mg/hr (09/13/21 1617)    norepinephrine 13 mcg/min (09/13/21 1535)    pantoprozole (PROTONIX) infusion 8 mg/hr (09/13/21 1535)    fentaNYL 125 mcg/hr (09/13/21 0808)     PRN Meds:sodium chloride, HYDROmorphone, HYDROmorphone, fentanNYL, fentanNYL, HYDROcodone 5 mg - acetaminophen, ondansetron, promethazine, sodium chloride flush, sodium chloride, ondansetron **OR** ondansetron, acetaminophen **OR** acetaminophen, potassium chloride, potassium chloride    Review of Systems  Unable to obtain since patient is currently intubated and sedated    Objective:     I/O last 3 completed shifts: In: 3891 [I.V.:939.3;  Blood:313; IV Piggyback:443.7]  Out: 327 [Urine:327]  I/O this shift:  In: 367.1 [I.V.:216.3; IV Piggyback:150.8]  Out: -     General Appearance: Intubated and sedated, in no acute distress  Skin: warm and dry, no rash or erythema  Head: normocephalic and atraumatic  Eyes: pupils equal, round, and reactive to light, extraocular eye movements intact, conjunctivae normal  ENT: external ear and ear canal normal bilaterally, nose without deformity, nasal mucosa and turbinates normal  Neck: supple and non-tender without mass, no cervical lymphadenopathy  Pulmonary/Chest: clear to auscultation bilaterally- no wheezes, rales or rhonchi, normal air movement, no respiratory distress  Cardiovascular: normal rate, regular rhythm,  no murmurs, rubs, distal pulses intact, no carotid bruits  Abdomen: soft, non-tender, non-distended, normal bowel sounds, no masses or organomegaly  Lymph Nodes: Cervical, supraclavicular normal  Extremities: no cyanosis, clubbing or edema  Musculoskeletal: normal range of motion, no joint swelling, deformity or tenderness  Neurologic: Sedated, no focal neurologic deficits    Data Review:  LABS:  Recent Labs     09/11/21  0440 09/11/21  0600 09/11/21  0900 09/11/21  1255 09/11/21  2205 09/12/21  0050 09/12/21  0530 09/12/21  1500 09/12/21  1845 09/12/21  2139 09/13/21  0028 09/13/21  0501 09/13/21  1430 09/13/21  1445   WBC  --    < > 27.8*  --   --   --  23.0*  --   --   --   --  18.8*  --   --    HGB  --    < > 7.8*   < > 8.0*   < > 7.9*  --   --  7.3*  --  6.9* 7.6*  --    HCT  --    < > 22.5*   < > 22.4*   < > 22.5*  --   --  20.9*  --  19.3* 21.9*  --    PLT  --    < > 86*  --   --   --  80*  --   --   --   --  69*  --   --    *  --   --   --   --   --  183*  --   --   --   --  153*  --   --    *  --   --   --   --   --  258*  --   --   --   --  215*  --   --    *  --   --    < >  --    < > 133*   < >   < >  --  133* 136 135*  --    K 2.0*  --   --    < > 2.8*  --  3.7  --   --   --   --  3.5  --   --    CL 93*  --   --   --   --   --  98*  --   --   --   --  99  --   --    CREATININE 2.2*  --   --   --   --   --  2.0*  --   --   --   --  3.2*  --   --    BUN 55*  --   --   --   --   --  54*  --   --   --   --  58*  --   --    CO2 21  --   --   --   --   --  21  --   --   --   --  21  --   --    INR  --   --  3.01*  --   --   --  2.85*  --   -- --   --   --   --  2.76*    < > = values in this interval not displayed. Recent Labs     09/11/21  0440 09/11/21  1255 09/11/21  2205 09/12/21  0050 09/12/21  0530 09/12/21  1500 09/13/21  0028 09/13/21  0501 09/13/21  1430   GLUCOSE 110*  --   --   --  85  --   --  99  --    CALCIUM 6.9*  --   --   --  7.5*  --   --  7.8*  --    *   < >  --    < > 133*   < > 133* 136 135*   K 2.0*   < > 2.8*  --  3.7  --   --  3.5  --    CO2 21  --   --   --  21  --   --  21  --    CL 93*  --   --   --  98*  --   --  99  --    BUN 55*  --   --   --  54*  --   --  58*  --    CREATININE 2.2*  --   --   --  2.0*  --   --  3.2*  --     < > = values in this interval not displayed. Recent Labs     09/11/21  0445 09/12/21  0425 09/13/21  0458   PHART 7.378 7.331* 7.344*   [id number] 36.4 40.7 39.2   PO2ART 137.0* 112.0* 171.0*   [id number] 21.4 21.5 21.3   K4HKHQCD 99.7 98.7 100.0   BEART -3.4* -4.1* -4.0*       Lab Results   Component Value Date    INR 2.76 (H) 09/13/2021    INR 2.85 (H) 09/12/2021    INR 3.01 (H) 09/11/2021    PROTIME 32.5 (H) 09/13/2021    PROTIME 33.7 (H) 09/12/2021    PROTIME 35.6 (H) 09/11/2021     No results found for: AMYLASE   No results found for: LABA1C  No results found for: EAG  No results found for: TSH, X7IVHWV, S4EDMKD, THYROIDAB, FT3, T4FREE  Lab Results   Component Value Date    CKTOTAL 115 09/13/2021    TROPONINI 0.09 (H) 09/10/2021      No results found for: CRP   No results found for: BNP   No results found for: DDIMER   No results found for: FERRITIN   Lab Results   Component Value Date    LACTA 1.0 09/13/2021       Radiology: All pertinent images / reports were reviewed as a part of this visit.     Narrative   EXAMINATION:   CT OF THE CHEST WITHOUT CONTRAST; CT OF THE ABDOMEN AND PELVIS WITHOUT   CONTRAST 9/10/2021 12:06 am; 9/10/2021 12:07 am       TECHNIQUE:   CT of the chest was performed without the administration of intravenous   contrast. Multiplanar reformatted images are provided well aerated without evidence of consolidation. The pleural surfaces are unremarkable without evidence of pleural thickening   or pleural effusion identified.  The heart is mildly enlarged.       Soft Tissues/Bones: The bones, skeletal muscle bundles, fascial planes and   subcutaneous soft tissues are unremarkable in appearance.           Abdomen/Pelvis:       Organs: Nodular contour of the liver is present.  Associated enlargement of   the main portal vein is seen which measures up to 15 mm in transluminal   diameter.  Diffuse gallbladder wall thickening is identified which measures   up to 7 mm.  Dependent sludge is seen within the gallbladder lumen.  The   liver, gallbladder, spleen, pancreas, adrenal glands, kidneys, are   unremarkable in appearance.       GI/Bowel: The stomach is unremarkable without wall thickening or distention. Bowel loops are unremarkable in appearance without evidence of obstruction,   distension or mucosal thickening.       Pelvis: Urinary bladder is minimally distending in setting of Peterson catheter   placement and is grossly unremarkable in appearance.  No evidence of pelvic   free fluid is seen.       Peritoneum/Retroperitoneum: No evidence of retroperitoneal or intraperitoneal   lymphadenopathy is identified.  Scattered moderate intraperitoneal free fluid   is visualized. .       Bones/Soft Tissues: The bones, skeletal muscle bundles, fascial planes and   subcutaneous soft tissues are unremarkable in appearance.           Impression   1. Hepatic cirrhosis with associated portal venous hypertension and scattered   moderate intraperitoneal free fluid. 2. Diffuse gallbladder wall thickening suggesting association with chronic   versus acute cholecystitis. 3. Suspected gallbladder lumen dependent radiodense sludge. 4. Mild cardiomegaly.        Problem List:     Hepatic encephalopathy  Acute versus chronic cholecystitis  Alcoholic cirrhosis with hepatitis  Hyperammonemia  Hypovolemic shock  Anemia  Nonbleeding esophageal varices  Hemorrhoids  Hyponatremia/CHAO    Assessment/Plan:     Patient is currently intubated for airway protection. ABG showing stable gas exchange. We will start weaning the ventilatory support from tomorrow once he has undergone paracentesis. Hepatic encephalopathy/hyperammonemia. Ammonia 136>64>75, bilirubin 25, >258, >183, alk phos 247>217. Diffuse gallbladder wall thickening ? Acute versus chronic cholecystitis with gall bladder sludge. Right upper quadrant ultrasound pretty much confirms the same. On IV Zosyn empirically. Lactulose and rifaximin for hyperammonemia with improvement. Currently on lactulose 20 g every 4 hours with rifaximin 550 mg twice daily. Underwent EGD today. Nonbleeding esophageal varices seen. Octreotide gtt. can be stopped. Hemorrhoidal bleeding seen. General surgery consulted. Still requiring Levophed support. Titrate to maintain SBP above 90 mmHg. Received 2 units of PRBC and 6 units of FFP so far. INR 2.85. Receiving daily vitamin K 10 mg IV. Will undergo ultrasound-guided paracentesis today. We will send a peritoneal fluid for cell differential, Gram stain and culture  Mild troponin elevation possibly related to demand ischemia, no acute EKG changes. Hyponatremia/CHAO, possibly from volume depletion. Urine sodium <20, could point towards volume depletion versus HRS. Creatinine 2.3. Acidosis is resolved. Patient received albumin infusions for potential HRS. Potassium adequately corrected  Can restart the patient on tube feeds as per GI recommendations. Protonix gtt. can be changed to IV Protonix twice a day.     Total critical care time caring for this patient with life threatening illness, including direct patient contact, management of life support systems, review of data including imaging and labs, discussions with other team members and physicians is at least 36 minutes so far today, excluding procedures.         Felix Saint, MD   Pulmonary Critical Care and Sleep Medicine  [employer]   [address], [address] [facility], [facility] [address] [facility], [place], [address]  9/9/2021, 4:48 PM

## 2021-09-14 NOTE — PROGRESS NOTES
Assessment complete, vitals obtained. NSR on telemetry. Levophed continues for BP support. Pt sedated on vent w/ versed and fentanyl. protonix & octreotide gtts per MAR. Pt responds to pain. Pupils equal and reactive, scleral edema + jaundice. + gag, ETT @ 25 cm lip. Lungs clear / diminished. Ab  distended. + bowel sounds. OGT @ 56 cm, clamped for med pass. Due meds given, see MAR. Skin jaundiced, BLE/BUE nonpitting edema. Peripheral pulses palpable. Peterson draining. Pt turned / repositioned. Restraints & safety precautions in place.

## 2021-09-14 NOTE — PLAN OF CARE
Problem: Non-Violent Restraints  Goal: No harm/injury to patient while restraints in use  Outcome: Ongoing  Note: Pt remains free from restraint related injury.  Criteria to discontinue not met

## 2021-09-14 NOTE — PLAN OF CARE
Problem: Non-Violent Restraints  Goal: Removal from restraints as soon as assessed to be safe  Outcome: Ongoing  Goal: No harm/injury to patient while restraints in use  9/14/2021 0946 by Felipe Viveros RN  Outcome: Ongoing  9/14/2021 0537 by Madonna Murphy RN  Outcome: Ongoing  Note: Pt remains free from restraint related injury.  Criteria to discontinue not met   Goal: Patient's dignity will be maintained  Outcome: Ongoing     Problem: Nutrition  Goal: Optimal nutrition therapy  Outcome: Ongoing     Problem: Skin Integrity:  Goal: Will show no infection signs and symptoms  Description: Will show no infection signs and symptoms  Outcome: Ongoing  Goal: Absence of new skin breakdown  Description: Absence of new skin breakdown  Outcome: Ongoing     Problem: Falls - Risk of:  Goal: Will remain free from falls  Description: Will remain free from falls  Outcome: Ongoing  Goal: Absence of physical injury  Description: Absence of physical injury  Outcome: Ongoing     Problem: Infection:  Goal: Will remain free from infection  Description: Will remain free from infection  Outcome: Ongoing     Problem: Safety:  Goal: Free from accidental physical injury  Description: Free from accidental physical injury  Outcome: Ongoing  Goal: Free from intentional harm  Description: Free from intentional harm  Outcome: Ongoing     Problem: Daily Care:  Goal: Daily care needs are met  Description: Daily care needs are met  Outcome: Ongoing     Problem: Pain:  Goal: Patient's pain/discomfort is manageable  Description: Patient's pain/discomfort is manageable  Outcome: Ongoing  Goal: Pain level will decrease  Description: Pain level will decrease  Outcome: Ongoing  Goal: Control of acute pain  Description: Control of acute pain  Outcome: Ongoing  Goal: Control of chronic pain  Description: Control of chronic pain  Outcome: Ongoing     Problem: Skin Integrity:  Goal: Skin integrity will stabilize  Description: Skin integrity will stabilize  Outcome: Ongoing     Problem: Discharge Planning:  Goal: Patients continuum of care needs are met  Description: Patients continuum of care needs are met  Outcome: Ongoing

## 2021-09-14 NOTE — PROGRESS NOTES
09/14/21 0738   Vent Patient Data   I:E Ratio 1:3.1   Plateau Pressure 15 [id number]   Static Compliance 49.8 mL/cmH2O   Dynamic Compliance 41.2 mL/cmH2O

## 2021-09-14 NOTE — PROGRESS NOTES
09/14/21 1945   Vent Patient Data   Plateau Pressure 14 [id number]   Static Compliance 50 mL/cmH2O   Dynamic Compliance 37.3 mL/cmH2O

## 2021-09-14 NOTE — PROGRESS NOTES
Assessment complete. VSS. Patient remains sedated on ventilator. Responsive to painful stimuli. PERRLA. Gag reflex present. Scleral edema. Skin warm, dry, intact, and jaundiced. Edematous +2 pitting. ETT size 8, 25 cm at lip. Vent settings: AC/VC. Rate 14. . FiO2 35%. PEEP 5. OG in place at 57 cm to lip, clamped. NSR per monitor. Abdomen distended and taut. Active BS in all quadrants. ROM performed. Restraints remain for patient safety. Repositioned for comfort/skin breakdown prevention.

## 2021-09-14 NOTE — PROGRESS NOTES
Patient failed SBT after 30 minutes due to low respirations of 7 and unable to follow commands. Patient ventilator settings switch to previous volume settings.

## 2021-09-14 NOTE — PROGRESS NOTES
Physician Progress Note      Jose Dockery  CSN #:                  [id number]  :                       1965  ADMIT DATE:       2021 9:42 PM  DISCH DATE:  RESPONDING  PROVIDER #:        DELPHINE STEIN          QUERY TEXT:    Pt admitted with GI Bleed. Pt noted to have Elevated WBC and Lactic acid and   Hypotension. If possible, please document in the progress notes and/or   discharge summary if you are evaluating and /or treating any of the following: The medical record reflects the following:  Risk Factors: Documentation of possible acute on chronic cholecystitis or SBP  Clinical Indicators: B/P was as low as 57/40, WBC on admit 42.9, Lactic Acid   the next day was 4.7, per GI consult on 9/10: Hypotension might be due to   sepsis, per Nephrology consult on : CHAO 2/2 ATN from hypotension / shock  Treatment: IV Fluids, IV Levophed, IV Zosyn  Options provided:  -- Sepsis, present on admission  -- Sepsis, present on admission, now resolved  -- Sepsis, not present on admission but developed during stay  -- Localized infection only without Sepsis  -- Sepsis was ruled out  -- Other - I will add my own diagnosis  -- Disagree - Not applicable / Not valid  -- Disagree - Clinically unable to determine / Unknown  -- Refer to Clinical Documentation Reviewer    PROVIDER RESPONSE TEXT:    This patient has sepsis which was present on admission.     Query created by: Faisal Ventura on 2021 11:49 AM      Electronically signed by:  Scott Bacon 2021 12:17 PM

## 2021-09-14 NOTE — PROGRESS NOTES
Reassessment complete, vitals obtained. AM labs drawn from CVC,  Pt turned / repositioned. Restraints & safety precautions in place.

## 2021-09-14 NOTE — PROGRESS NOTES
Reassessment complete, vitals obtained. Due meds given, see MAR. Pt bathed, new gown / linens. Turned / repositioned. Restraints & safety precautions in place.

## 2021-09-14 NOTE — PROGRESS NOTES
[facility]        Progress Note    Pt Name: Tiffanie Mccarthy  MRN: [id number]  [place]: 1965  Date of evaluation: 9/14/2021    Chief Complaint: GI bleed      Subjective:    No acute events overnight  Intubated, sedated, stable on vent  Pressors, gradually decreasing  No further bleeding per rectum      Vital Signs:  Patient Vitals for the past 24 hrs:   BP Temp Temp src Pulse Resp SpO2 Weight   09/14/21 1216     18 100 %    09/14/21 1000 (!) 94/56 98.1 °F (36.7 °C) Temporal 77 13 100 %    09/14/21 0900 96/62   78 14 100 %    09/14/21 0800 (!) 91/55 97.6 °F (36.4 °C) Temporal 76 14 100 %    09/14/21 0738     14 100 %    09/14/21 0700 102/60   70 14     09/14/21 0645 105/63   71 14     09/14/21 0630 108/69   69 14     09/14/21 0615 113/67   68 14     09/14/21 0600 112/66   70 14 100 %    09/14/21 0545 107/63   67 14     09/14/21 0530 109/61   68 14     09/14/21 0515 109/65   67 14     09/14/21 0500 115/72   76 17 100 %    09/14/21 0445 109/67   64 14     09/14/21 0430 109/67   68 14     09/14/21 0415 110/68   69 14     09/14/21 0412    69      09/14/21 0400 114/68 97.1 °F (36.2 °C) Temporal 68 14 100 % 181 lb 7 oz (82.3 kg)   09/14/21 0345 114/70         09/14/21 0330 109/62         09/14/21 0315 107/64         09/14/21 0300 105/64   70 14 100 %    09/14/21 0245 119/70   71 14     09/14/21 0230 117/70   68 15     09/14/21 0215 112/66   70 14     09/14/21 0200 115/70   69 14 100 %    09/14/21 0145 117/67   72 14     09/14/21 0130 118/68   73 14     09/14/21 0115 98/67   80 14     09/14/21 0100      100 %    09/14/21 0045 115/66   72 14     09/14/21 0030 113/65   73 14     09/14/21 0015 111/65   73 14     09/14/21 0000 108/61 97.5 °F (36.4 °C) Temporal 75 14 100 %    09/13/21 2345 (!) 105/56   76 20     09/13/21 2330 (!) 105/56  [employer] 77 14     21 2315 (!) 103/57   78 15     21 2300 (!) 100/54   78 14 100 %    21 2245 (!) 101/55   80 14     21 2230 (!) 97/51   78 14     21 2215 (!) 99/51   77 14     21 2200 (!) 106/55   75 14 100 %    21 2145 105/60   70 17     21 2130 104/60   68 15     21 (!) 105/59   70 14     21 2100 105/60   70 13 100 %    21 (!) 103/56   70 13     21 (!) 103/56   71 15     21 (!) 106/59   69 13     21 (!) 105/58 97 °F (36.1 °C) Temporal 70 14 100 %    21 1945 107/60   70 15     21 1800 (!) 107/59 97.6 °F (36.4 °C) Temporal 74 15 100 %    21 1600 (!) 105/55 97.6 °F (36.4 °C) Temporal 76 13 100 %    21 1535    70 14 100 %    21 1400 104/61 98.1 °F (36.7 °C) Temporal 71 15 100 %       TEMPERATURE HISTORY 24H: Temp (24hrs), Av.6 °F (36.4 °C), Min:97 °F (36.1 °C), Max:98.1 °F (36.7 °C)    BLOOD PRESSURE HISTORY: Systolic (77TKF), TGX:623 , Min:88 , HVC:721    Diastolic (75ZYO), ASHLEY:33, Min:50, Max:77      Intake/Output:    I/O last 3 completed shifts:   In: 1872.3 [I.V.:992.5; Blood:313; IV Piggyback:566.8]  Out: 2250 [Urine:1350; Emesis/NG output:900]  I/O this shift:  In: 165.7 [I.V.:85.6; IV Piggyback:80.1]  Out: -   Drain/tube Output:           Physical Exam:  General: sedated, on vent, does not follow commands, no spontaneous movements noted during exam  Cardiac: regular rate and rhythm   Pulmonary: clear to auscultation bilaterally   Abdomen: soft, non-distended, difficult to assess tenderness  Rectum: not assessed    Labs:  CBC:    Recent Labs     21  0530 21  2139 21  0501 21  0501 21  1430 21  0435 21  1215   WBC 23.0*  --  18.8*  --   --  19.7*  --    HGB 7.9*   < > 6.9*   < > 7.6* 7.8* 7.8*   HCT 22.5*   < > 19.3*   < > 21.9* 21.9* 22.6*   PLT 80*  --  69*  --   --  53*  -- evaluation on this patient. I have interviewed and examined the patient and I agree with the assessment above. In summary, my findings and plan are the following:   Mr. Gabriela Perez is a 55 y.o. male who presents with   Hepatic encephalopathy  GI bleeding  Acute blood loss anemia  Alcoholic hepatic cirrhosis, portal venous hypertension  Leukocytosis  Hypotension, shock  Hyponatremia  Acute kidney injury     Plan:  1. No current bleeding per rectum. Hemorrhoid present and may intermittently be cause of bleeding. However risks of hemorrhoidectomy far outweigh benefits as patient may bleed more significantly from surgical site than from hemorrhoid itself. Primary treatment for bleeding hemorrhoid in cirrhotic must center around control of portal hypertension, may require transfer to tertiary center   2. Coagulopathy including refractory INR and thrombocytopenia contributing to intermittent bleeding, continue to treat and transfuse as needed  3. IVF, wean pressors as able  4. Overall prognosis remains grim given multitude and severity of medical comorbidities  5. Defer management of remainder of medical comorbidities to primary and consulting teams  6. No further acute general surgery issues and will follow peripherally. Please call with questions and follow electively as needed     Osmar Pierce MD, FACS  9/14/2021  1:50 PM

## 2021-09-14 NOTE — PROGRESS NOTES
6 times per day    piperacillin-tazobactam  3,375 mg IntraVENous Q8H    Empty Capsule #00 Purple/White  1 each Does not apply Prior to discharge     PRN Meds: sodium chloride, HYDROmorphone, HYDROmorphone, fentanNYL, fentanNYL, sodium chloride flush, sodium chloride, ondansetron **OR** ondansetron, acetaminophen **OR** acetaminophen, potassium chloride      Intake/Output Summary (Last 24 hours) at 9/14/2021 1209  Last data filed at 9/14/2021 0941  Gross per 24 hour   Intake 1725.01 ml   Output 2250 ml   Net -524.99 ml       Physical Exam Performed:    BP (!) 94/56   Pulse 77   Temp 98.1 °F (36.7 °C) (Temporal)   Resp 13   Ht 5' 5\" (1.651 m)   Wt 181 lb 7 oz (82.3 kg)   SpO2 100%   BMI 30.19 kg/m²     General appearance: visibly jaundiced  HEENT: Pupils equal, round, and reactive to light. Has scleral icterus. Neck: Supple, with full range of motion. No jugular venous distention. Trachea midline. Respiratory:  Vented. . Clear to auscultation, bilaterally without Rales/Wheezes/Rhonchi. Cardiovascular: Regular rate and rhythm with normal S1/S2 without murmurs, rubs or gallops. Abdomen: Soft,  mildlyy distended. BS +   Musculoskeletal: No clubbing, cyanosis or edema bilaterally. Full range of motion without deformity. Skin: Skin color, texture, turgor normal.  No rashes or lesions. Neurologic: Intubated and sedated  Psychiatric: sedated  Capillary Refill: Brisk,3 seconds, normal   Peripheral Pulses: +2 palpable, equal bilaterally       Labs:   Recent Labs     09/12/21  0530 09/12/21  2139 09/13/21  0501 09/13/21  1430 09/14/21  0435   WBC 23.0*  --  18.8*  --  19.7*   HGB 7.9*   < > 6.9* 7.6* 7.8*   HCT 22.5*   < > 19.3* 21.9* 21.9*   PLT 80*  --  69*  --  53*    < > = values in this interval not displayed.      Recent Labs     09/12/21  0530 09/12/21  1500 09/13/21  0501 09/13/21  1430 09/14/21  0435   *   < > 136 135* 140   K 3.7  --  3.5  --  2.7*   CL 98*  --  99  --  102   CO2 21  --  21  -- 20*   BUN 54*  --  58*  --  59*   CREATININE 2.0*  --  3.2*  --  3.3*   CALCIUM 7.5*  --  7.8*  --  8.2*   PHOS 4.4  --  4.7  --  4.6    < > = values in this interval not displayed. Recent Labs     09/12/21  0530 09/13/21  0501 09/14/21  0435   * 215* 180*   * 153* 116*   BILITOT 27.2* 28.2* 27.1*   ALKPHOS 217* 202* 181*     Recent Labs     09/12/21  0530 09/13/21  1445 09/14/21  0435   INR 2.85* 2.76* 2.94*     Recent Labs     09/12/21  0530 09/13/21  0501 09/14/21  0435   CKTOTAL 139 115 94       Urinalysis:      Lab Results   Component Value Date    NITRU Negative 09/09/2021    WBCUA 0-2 09/09/2021    BACTERIA 1+ 09/09/2021    RBCUA 3-4 09/09/2021    BLOODU TRACE-LYSED 09/09/2021    SPECGRAV 1.020 09/09/2021    GLUCOSEU 100 09/09/2021       Radiology:  IR US GUIDED PARACENTESIS   Final Result   Successful ultrasound guided paracentesis. US GALLBLADDER RUQ   Final Result   Gallbladder wall is thickened. This is a nonspecific finding in the presence   of ascites as it may simply be reactive, rather than secondary to underlying   cholecystitis      Low level echoes are seen within the gallbladder, either stones or sludge. Cirrhosis with moderate ascites         XR CHEST PORTABLE   Final Result   New central venous catheter as above, approach is unclear. CT ABDOMEN PELVIS WO CONTRAST Additional Contrast? None   Final Result   1. Hepatic cirrhosis with associated portal venous hypertension and scattered   moderate intraperitoneal free fluid. 2. Diffuse gallbladder wall thickening suggesting association with chronic   versus acute cholecystitis. 3. Suspected gallbladder lumen dependent radiodense sludge. 4. Mild cardiomegaly. CT CHEST WO CONTRAST   Final Result   1. Hepatic cirrhosis with associated portal venous hypertension and scattered   moderate intraperitoneal free fluid.    2. Diffuse gallbladder wall thickening suggesting association with chronic   versus acute cholecystitis. 3. Suspected gallbladder lumen dependent radiodense sludge. 4. Mild cardiomegaly. CT Head WO Contrast   Final Result   No acute intracranial abnormality. XR CHEST PORTABLE   Final Result   Endotracheal tube and orogastric tube are acceptable. Bibasilar opacities are again noted. XR CHEST PORTABLE   Final Result   Reticulation at the left base more than right is nonspecific. Could relate   to subsegmental atelectasis, aspiration pneumonitis, or early pneumonia. Assessment/Plan:    Active Hospital Problems    Diagnosis     Hepatic encephalopathy ([address] [K72.90]     Hyperammonemia ([address] [E72.20]     Elevated lactic acid level [H60.73]     Metabolic acidosis, increased anion gap (IAG) [E87.2]     Elevated troponin [R77.8]     Hypokalemia [E87.6]     Hyponatremia [E87.1]     Hypotension [I95.9]     Anemia [D64.9]     GI bleed [K92.2]     Transaminitis [R74.01]        Acute respiratory failure  Secondary to hepatic encephalopathy and ascites. He remains intubated and sedated, CCM on board     Hepatic encephalopathy  Appears to be end-stage cirrhosis at this time or at minimum decompensated cirrhosis. Ammonia is trending down 72, on lactulose and rifaximin mean, GI on board. Elevated liver functions and jaundice  Appears to be related to alcoholic cirrhosis  Unclear if the patient has any hepatitis history. GI is following    Acute metabolic acidosis  Appears to be a combination of acute kidney injury possible sepsis lactic acidosis and decompensated cirrhosis.   We are working to correct the patient's abnormalities  Nephrology is following as well, had albumin, no urgent RRT     Acute kidney injury  Secondary to all of the above  Nephrology is following as noted above  Continue to monitor renal function    Anemia with possible GI bleed  Patient has been started on octreotide  He is on PPI as well  Gastroenterology has been consulted for his cirrhosis as well as for the possible bleed. GI has determined that this is secondary to hemorrhoids General surgery was consulted it would be more dangerous to do a surgery for the hemorrhoid then it would be to just allow it to ooze a little bit.   Banding would also be more dangerous     Thrombocytopenia  Secondary to liver disease    DVT Prophylaxis: SCDs  Diet: Diet NPO  Code Status: Full Code    PT/OT Eval Status: When appropriate    Dispo -patient is critically ill medical decision making is high  35 minutes of critical care time spent    Leila Kaiser MD

## 2021-09-14 NOTE — PROGRESS NOTES
Office : [phone]     Fax :[phone]       Nephrology  Progress Note      Patient's Name: Tiffanie Mccarthy  10:58 AM  9/14/2021    Reason for Consult:  CHAO , Hyponatremia     Requesting Physician: Dr. Ofelia Rojas. Chief Complaint:    Chief Complaint   Patient presents with    Altered Mental Status     pt brought to ed by [place] [facility] after being found by family sitting in chair unresponsive but breahing, bs for squad was 79 1 amp given in route patient moaning at triage          History of Present iIlness:    Tiffanie Mccarthy is a gentleman with unknown age possible history of cirrhosis who apparently presented with family having been found by family poorly responsive who initiated CPR. EMS was called and on arrival to the ER patient was intubated. Post intubation he developed hypotension requiring fluid resuscitation with some improvement. Laboratory work-up was noted for ammonia of 136, lipase of 166, troponin of 0.10, sodium 122, potassium 2.9, CO2 of 18, creatinine of 2.0 and transaminitis with ,   CT chest abdomen pelvis revealed cirrhosis with portal hypertension and diffuse gallbladder thickening concerning for acute versus chronic cholecystitis with gallbladder sludge  CT brain with no acute intracranial pathology  EKG sinus rhythm  Urinalysis negative  History has been gathered from medical records      Latest sodium level is 119. Interval hx       Intubated on vent support     On pressor support    BUN and creat remains elevated         I/O last 3 completed shifts: In: 1872.3 [I.V.:992.5; Blood:313; IV Piggyback:566.8]  Out: 0921 [Urine:1350; Emesis/NG output:900]    No past medical history on file.     Past Surgical History:   Procedure Laterality Date    COLONOSCOPY N/A 9/13/2021    COLONOSCOPY DIAGNOSTIC performed by Flora Reyes MD at [facility] N/A 9/13/2021    EGD DIAGNOSTIC ONLY performed by Flora Reyes MD at [facility]       No family history on file. Allergies:  Patient has no known allergies. Current Medications:    0.9 % sodium chloride infusion, PRN  HYDROmorphone (DILAUDID) injection 0.25 mg, Q5 Min PRN  HYDROmorphone (DILAUDID) injection 0.5 mg, Q5 Min PRN  fentaNYL (SUBLIMAZE) injection 25 mcg, Q5 Min PRN  fentaNYL (SUBLIMAZE) injection 50 mcg, Q5 Min PRN  sodium chloride flush 0.9 % injection 5-40 mL, 2 times per day  sodium chloride flush 0.9 % injection 5-40 mL, PRN  0.9 % sodium chloride infusion, PRN  ondansetron (ZOFRAN-ODT) disintegrating tablet 4 mg, Q8H PRN   Or  ondansetron (ZOFRAN) injection 4 mg, Q6H PRN  acetaminophen (TYLENOL) tablet 650 mg, Q6H PRN   Or  acetaminophen (TYLENOL) suppository 650 mg, Q6H PRN  ipratropium-albuterol (DUONEB) nebulizer solution 1 ampule, 4x daily  midazolam (VERSED) 100 mg in dextrose 5 % 100 mL infusion, Continuous  potassium chloride 20 mEq/50 mL IVPB (Central Line), PRN  rifaximin (XIFAXAN) tablet 550 mg, BID  lactulose (CHRONULAC) 10 GM/15ML solution 20 g, 6 times per day  norepinephrine (LEVOPHED) 16 mg in dextrose 5% 250 mL infusion, Continuous  pantoprazole (PROTONIX) 80 mg in sodium chloride 0.9 % 100 mL infusion, Continuous  piperacillin-tazobactam (ZOSYN) 3,375 mg in dextrose 5 % 50 mL IVPB extended infusion (mini-bag), Q8H  PATIENT HOME MEDS STORED IN PHARMACY!!!!, Prior to discharge  fentaNYL 10 mcg/mL infusion, Continuous        Physical exam:     Vitals:  BP (!) 94/56   Pulse 77   Temp 98.1 °F (36.7 °C) (Temporal)   Resp 13   Ht 5' 5\" (1.651 m)   Wt 181 lb 7 oz (82.3 kg)   SpO2 100%   BMI 30.19 kg/m²   Constitutional:  Intubated   Skin: no rash, turgor wnl  Heent:   Icterus +  Neck: no bruits or jvd noted  Cardiovascular:  S1, S2 without m/r/g  Respiratory: CTA B without w/r/r  Abdomen:  +bs, soft, nt, nd  Ext: no  lower extremity edema  Psychiatric: mood and affect appropriate  Musculoskeletal:  Rom, muscular strength intact    Labs:  CBC:   Recent Labs     09/12/21  0530 09/12/21  2139 09/13/21  0501 09/13/21  1430 09/14/21  0435   WBC 23.0*  --  18.8*  --  19.7*   HGB 7.9*   < > 6.9* 7.6* 7.8*   PLT 80*  --  69*  --  53*    < > = values in this interval not displayed. BMP:    Recent Labs     09/12/21  0530 09/12/21  1500 09/13/21  0501 09/13/21  1430 09/14/21  0435   *   < > 136 135* 140   K 3.7  --  3.5  --  2.7*   CL 98*  --  99  --  102   CO2 21  --  21  --  20*   BUN 54*  --  58*  --  59*   CREATININE 2.0*  --  3.2*  --  3.3*   GLUCOSE 85  --  99  --  92    < > = values in this interval not displayed. Ca/Mg/Phos:   Recent Labs     09/12/21  0530 09/12/21  0530 09/13/21  0501 09/13/21  1430 09/14/21  0435   CALCIUM 7.5*  --  7.8*  --  8.2*   MG 2.20   < > 2.10 2.20 2.20   PHOS 4.4  --  4.7  --  4.6    < > = values in this interval not displayed. Hepatic:   Recent Labs     09/12/21  0530 09/13/21  0501 09/14/21  0435   * 215* 180*   * 153* 116*   BILITOT 27.2* 28.2* 27.1*   ALKPHOS 217* 202* 181*     Troponin:   No results for input(s): TROPONINI in the last 72 hours. BNP: No results for input(s): BNP in the last 72 hours. Lipids: No results for input(s): CHOL, TRIG, HDL, LDLCALC, LABVLDL in the last 72 hours. ABGs:   Recent Labs     09/14/21 0435   PHART 7.339*   PO2ART 153.0*   OAX9DDU 39.9     INR:   Recent Labs     09/12/21  0530 09/13/21  1445 09/14/21 0435   INR 2.85* 2.76* 2.94*     UA:  No results for input(s): Luanne Harp, GLUCOSEU, BILIRUBINUR, KETUA, SPECGRAV, BLOODU, PHUR, PROTEINU, UROBILINOGEN, NITRU, LEUKOCYTESUR, Vilinda Muck in the last 72 hours.    Urine Microscopic:   No results for input(s): LABCAST, BACTERIA, COMU, HYALCAST, WBCUA, RBCUA, EPIU in the last 72 hours. Urine Culture: No results for input(s): LABURIN in the last 72 hours. Urine Chemistry:   No results for input(s): Vero Hun, PROTEINUR, NAUR in the last 72 hours. IMAGING:  IR US GUIDED PARACENTESIS   Final Result   Successful ultrasound guided paracentesis. US GALLBLADDER RUQ   Final Result   Gallbladder wall is thickened. This is a nonspecific finding in the presence   of ascites as it may simply be reactive, rather than secondary to underlying   cholecystitis      Low level echoes are seen within the gallbladder, either stones or sludge. Cirrhosis with moderate ascites         XR CHEST PORTABLE   Final Result   New central venous catheter as above, approach is unclear. CT ABDOMEN PELVIS WO CONTRAST Additional Contrast? None   Final Result   1. Hepatic cirrhosis with associated portal venous hypertension and scattered   moderate intraperitoneal free fluid. 2. Diffuse gallbladder wall thickening suggesting association with chronic   versus acute cholecystitis. 3. Suspected gallbladder lumen dependent radiodense sludge. 4. Mild cardiomegaly. CT CHEST WO CONTRAST   Final Result   1. Hepatic cirrhosis with associated portal venous hypertension and scattered   moderate intraperitoneal free fluid. 2. Diffuse gallbladder wall thickening suggesting association with chronic   versus acute cholecystitis. 3. Suspected gallbladder lumen dependent radiodense sludge. 4. Mild cardiomegaly. CT Head WO Contrast   Final Result   No acute intracranial abnormality. XR CHEST PORTABLE   Final Result   Endotracheal tube and orogastric tube are acceptable. Bibasilar opacities are again noted. XR CHEST PORTABLE   Final Result   Reticulation at the left base more than right is nonspecific. Could relate   to subsegmental atelectasis, aspiration pneumonitis, or early pneumonia. Assessment/Plan :      1.  CHAO 2/2 ATN from hypotension / shock. Anemia   Renal function stable   UOP improved   Recommend to dose adjust all medications  based on renal functions  Maintain SBP> 90 mmHg   Daily weights   AVOID NSAIDs  Avoid Nephrotoxins  Monitor Intake/Output  Call if significant decrease in urine output     2. Hyponatremia. Improved. Continue to monitor. 3.  Hypotension/ shock state   On levophed   Give albumin as needed     4. High anion gap acidosis. Resolved. 5. Hypokalemia - replace iv.     6. Hepatic failure. Encephalopathy   GI following. S/p  FFP. On PPI. 7. Anemia. Hb is 6.9.    S/p PRBC   Hb today 7.8       Recommend to dose adjust all medications  based on renal functions  Maintain SBP> 90 mmHg   Daily weights   AVOID NSAIDs  Avoid Nephrotoxins  Monitor Intake/Output  Call if significant decrease in urine output               Thank you for allowing us to participate in care of Asia Sheridan         Electronically signed by: Adair Frank MD, 9/14/2021, 10:58 AM      [facility] [address]  Office : [phone]  Fax :[phone]

## 2021-09-14 NOTE — PROGRESS NOTES
[facility]  Gastroenterology Progress Note    Adriana Sapp is a 55 y.o. male patient. 1. Hepatic encephalopathy ([address]    2. Gastrointestinal hemorrhage, unspecified gastrointestinal hemorrhage type    3. Thickening of wall of gallbladder    4. Liver failure with hepatic coma, unspecified chronicity (HCC)        SUBJECTIVE:  intubated    ROS:  Cardiovascular ROS: intubated  Gastrointestinal ROS: intubated  Respiratory ROS: intubated    Physical    VITALS:  BP 97/65   Pulse 87   Temp 97 °F (36.1 °C) (Temporal)   Resp 14   Ht 5' 5\" (1.651 m)   Wt 181 lb 7 oz (82.3 kg)   SpO2 100%   BMI 30.19 kg/m²   TEMPERATURE:  Current - Temp: 97 °F (36.1 °C); Max - Temp  Av.3 °F (36.3 °C)  Min: 97 °F (36.1 °C)  Max: 98.1 °F (36.7 °C)    NAD  Regular rate  Lungs CTA Bilaterally  Abdomen soft, stable mild distended, Bowel sounds present  Sedated  Remains jaundiced. Data    CBC:   Lab Results   Component Value Date    WBC 19.7 2021    RBC 2.40 2021    HGB 7.8 2021    HCT 22.6 2021    MCV 91.0 2021    MCH 32.4 2021    MCHC 35.6 2021    RDW 18.0 2021    PLT 53 2021    MPV 9.1 2021     Hepatic Function Panel:    Lab Results   Component Value Date    ALKPHOS 181 2021     2021     2021    PROT 4.6 2021    BILITOT 27.1 2021           ASSESSMENT / PLAN :    Alcoholic hepatitis, cirrhosis -. He was initially unnamed but later found out that he came to [facility] weeks go as Robel Mancuso  64 (not his real name and ; he in undocumented). He was discharged 2021 with severe alcoholic hepatitis with TB 28. He was discharged on prednisolone. In the ER, he was found to be hypotensive and required intubation  Bilirubin 27, , , INR 4. Ammonia 136. All concerning for liver failure. Undetectable acetaminophen, salicylate, and alcohol levels.  viral hepatitis panel normal. 21 lft stable/improved. .   Hepatic encephalopathy -ammonia 136 at admit. On lactulose via ng. Ascites -moderate per CT.  9/13/21 paracentesis saag >1.1 c/w portal htn and neg for neutrocytic ascities. On Rocephin empirically. GI bleed - blood per rectum at admit. 9/13 egd small nonbleeding varices and nonbleeding portal htn gastropathy, rec ppi and later hpylori stool and later b blocker. 9/13 colonoscopy with hemorrhoid source blood, 2 benign small polyps not removed. surg eval hemorrhoid, tx deferred. No further bleeding. Anemia  - unclear what baseline hemoglobin is.  hbg stable. Leukocytosis -WBC 43 at admit. c diff negative. On antibiotics. Stable. Hypotension - not from gi bleed, more likely infectious. Remains on pressor but lower dose. CHAO: per renal. Creat stable today. Hyponatremia  Hypokalemia:  improved  Elevated inr:  stabalized around 2.8. Id: neg neutrocytic ascities. On antbx.  Per primary team.      Recommendations:   - cont tube feeds  - f/u ascities culture/cytology  - continue wean pressors (lower dose today per nurse)  - wean sedation  -Lactulose every 4 hours, Xifaxan 550 mg twice daily  - received vitamin K  - daily inr, lft, wbc, creat  -PPI IV twice daily  - etoh cessation key going forward  - hold off prednisolone with infectious issues  - future stool hpylori   - future possible nadolol prophylaxis       Alanna Fox MD , MD  [address] Gastroenterology and [address]

## 2021-09-14 NOTE — PROGRESS NOTES
BRIEF NUTRITION NOTE    Pt remains intubated. Sedated with fentanyl and versed. Levophed at 5.6 mcg/min. OK for tube feeds today per Dr. Alka Lema. +11.3 liters. Na+ 140. K+ 3.3. Comparative Standards  Calories: [id number]  Protein:  grams    Recommendations:  1. Recommend Osmolite 1.2 (standard formula without fiber) at goal rate 70 mL per hour to provide 1680 mL total volume, 2016 calories, 93 grams protein, 1378 mL free water. 2. Water flush 30 mL q 4 hours for tube maintenance. 3. Ensure head of bed is 30 - 45 degrees during continuous or bolus gastric feeding and for one hour after bolus. Turn off the feeding if head of bed is lowered less than 30 degrees. 4. Monitor for tolerance (bowel habits, N/V, cramping, abdominal exam findings: distended, firm, tense, guarded, discomfort).     Electronically signed by Denise Horn RD, LOAN on 9/14/2021 at 3:14 PM   Contact: [phone]

## 2021-09-15 NOTE — PROGRESS NOTES
[facility] Pulmonary/CCM Progress note      Admit Date: 9/9/2021    Chief Complaint: Altered mental status    Subjective: Interval History: Patient remains critically ill. Ammonia level again rising. Patient not having any bowel movement. Recurrent ascites seen. Mentation remains poor. On full mechanical ventilatory support. No further GI bleed seen. Respiratory culture growing stenotrophomonas. Scheduled Meds:   sodium bicarbonate        pantoprazole  40 mg IntraVENous BID    levofloxacin  750 mg IntraVENous Q48H    sodium chloride flush  5-40 mL IntraVENous 2 times per day    ipratropium-albuterol  1 ampule Inhalation 4x daily    rifaximin  550 mg Per NG tube BID    lactulose  20 g Per NG tube 6 times per day    Empty Capsule #00 Purple/White  1 each Does not apply Prior to discharge     Continuous Infusions:   dexmedetomidine Stopped (09/15/21 1442)    vasopressin (Septic Shock) infusion 0.03 Units/min (09/15/21 1533)    sodium chloride      sodium chloride 1,000 mL (09/15/21 1546)    norepinephrine Stopped (09/15/21 1544)    fentaNYL 200 mcg/hr (09/15/21 1548)     PRN Meds:sodium chloride, sodium chloride flush, sodium chloride, ondansetron **OR** ondansetron, acetaminophen **OR** acetaminophen, potassium chloride    Review of Systems  Unable to obtain since patient is currently intubated and sedated    Objective:     I/O last 3 completed shifts: In: 1929.6 [I.V.:452.2; NG/GT:1172; IV Piggyback:305.4]  Out: 525 [Urine:525]  No intake/output data recorded.     General Appearance: Intubated and sedated, in no acute distress  Skin: warm and dry, no rash or erythema  Head: normocephalic and atraumatic  Eyes: pupils equal, round, and reactive to light, extraocular eye movements intact, conjunctivae normal  ENT: external ear and ear canal normal bilaterally, nose without deformity, nasal mucosa and turbinates normal  Neck: supple and non-tender without mass, no cervical --   --  102  --   --  103   BUN 58*  --   --  59*  --   --  67*   CREATININE 3.2*  --   --  3.3*  --   --  3.4*    < > = values in this interval not displayed. Recent Labs     09/13/21  0458 09/14/21  0435 09/15/21  0539   PHART 7.344* 7.339* 7.341*   OWE1NZU 39.2 39.9 35.9   PO2ART 171.0* 153.0* 159.0*   GTG6XLY 21.3 21.4 19.4*   U6REPFAG 100.0 99.8 99.9   BEART -4.0* -4.0* -5.8*       Lab Results   Component Value Date    INR 2.94 (H) 09/14/2021    INR 2.76 (H) 09/13/2021    INR 2.85 (H) 09/12/2021    PROTIME 34.8 (H) 09/14/2021    PROTIME 32.5 (H) 09/13/2021    PROTIME 33.7 (H) 09/12/2021     No results found for: AMYLASE   No results found for: LABA1C  No results found for: EAG  No results found for: TSH, V8TGZNI, O9LBDVN, THYROIDAB, FT3, T4FREE  Lab Results   Component Value Date    CKTOTAL 89 09/15/2021    TROPONINI 0.09 (H) 09/10/2021      No results found for: CRP   No results found for: BNP   No results found for: DDIMER   No results found for: FERRITIN   Lab Results   Component Value Date    LACTA 1.5 09/15/2021       Radiology: All pertinent images / reports were reviewed as a part of this visit. Narrative   EXAMINATION:   ONE XRAY VIEW OF THE CHEST       9/15/2021 6:03 am           FINDINGS:   There are low lung volumes.  There are bibasilar effusions. [place] is no   pneumothorax.  The heart is prominent.  The upper abdomen is unremarkable.    The extrathoracic soft tissues are unremarkable.  There is endotracheal tube   with the tip in the midtrachea.  There is a gastric tube with tip in the mid   aspect of the stomach.  There is a right internal jugular line with the tip   in the mid SVC.           Impression   Low lung volumes with bibasilar effusions.       Support tubes as described above.           Problem List:     Hepatic encephalopathy, worsening  Acute versus chronic cholecystitis  Alcoholic cirrhosis with hepatitis  Hyperammonemia, worsening  Hypovolemic versus septic shock  Anemia  Nonbleeding esophageal varices  Hemorrhoids  Hyponatremia/CHAO  Stenotrophomonas infection    Assessment/Plan:     Patient is currently intubated for airway protection. ABG showing stable gas exchange. Currently on full mechanical ventilatory support. Patient unable to tolerate pressure support ventilation as his mentation remains poor. Hepatic encephalopathy/hyperammonemia. Ammonia [id number]. Patient continues on lactulose and rifaximin. Not having any significant bowel movements. Last night also had excessive amount of gastric residuals. We will order for CT abdomen pelvis to rule out any intestinal obstruction. If no obstruction seen, will provide patient with naloxegol. Also has developed septic shock most likely due to stenotrophomonas infection in the lungs. Hypovolemic shock cannot be completely ruled out. Patient now on low-dose vasopressor support. Now isolating stenotrophomonas from respiratory culture. We will change him to Levaquin. Not a candidate for Bactrim due to poor renal functions. Underwent EGD earlier this admission with nonbleeding esophageal varices seen. Hemorrhoidal bleeding seen. General surgery consulted. No surgical interventions suggested. Received 2 units of PRBC and 6 units of FFP so far. INR 2.85. Receiving daily vitamin K 10 mg IV. Underwent ultrasound-guided paracentesis earlier this admission. Again developing ascites. Hyponatremia/CHAO, possibly from volume depletion. Urine sodium <20, could point towards volume depletion versus HRS. Creatinine 3.3 but urine output has improved. .    Acidosis is resolved. Patient received albumin infusions for potential HRS. Potassium adequately corrected  Can restart the patient on tube feeds as per GI recommendations. On IV Protonix twice a day. Patient has end-stage liver disease with recurrent ascites, severe portal hypertension.   Patient may benefit from liver transplant evaluation versus placement of TIPS for recurrent ascites. This can be provided at tertiary care center. But will be beneficial to reach out to [facility] to assess the potential to transfer this patient for higher care. Total critical care time caring for this patient with life threatening illness, including direct patient contact, management of life support systems, review of data including imaging and labs, discussions with other team members and physicians is at least 38 minutes so far today, excluding procedures.         Tiffany Moncada MD   Pulmonary Critical Care and Sleep Medicine  [facility]   [address], [address] [place] [address] Drive  9/9/2021, 3:49 PM

## 2021-09-15 NOTE — PROGRESS NOTES
Hospitalist Progress Note      PCP: No primary care provider on file. Date of Admission: 9/9/2021    Chief Complaint:     Altered Mental Status       pt brought to ed by [facility] after being found by family sitting in chair unresponsive but jill bs for squad was 79 1 amp given in route patient moaning at triage           Hospital Course:      No family at bedside to provide collateral history. History obtained from discussion with ER provider  Apparently file still being managed and has presented by different names in the past     The patient is a unknown y.o. adult with history of cirrhosis who apparently presented with family having been found by family poorly responsive who initiated CPR. EMS was called and on arrival to the ER patient was intubated. Post intubation he developed hypotension requiring fluid resuscitation with some improvement. Laboratory work-up was noted for ammonia of 136, lipase of 166, troponin of 0.10, sodium 122, potassium 2.9, CO2 of 18, creatinine of 2.0 and transaminitis with ,   CT chest abdomen pelvis revealed cirrhosis with portal hypertension and diffuse gallbladder thickening concerning for acute versus chronic cholecystitis with gallbladder sludge  CT brain with no acute intracranial pathology  EKG sinus rhythm  Urinalysis negative  He is notably jaundiced    Subjective: Intubated and sedated, more so LFTs, ammonia increasing to 86, INR 2.9, creatinine 3.4, growing stenotrophomonas in sputum, on Levaquin.       Medications:  Reviewed    Infusion Medications    sodium chloride      sodium chloride Stopped (09/14/21 0617)    norepinephrine Stopped (09/15/21 0200)    fentaNYL 200 mcg/hr (09/15/21 0857)     Scheduled Medications    pantoprazole  40 mg IntraVENous BID    levofloxacin  750 mg IntraVENous Q48H    sodium chloride flush  5-40 mL IntraVENous 2 times per day    ipratropium-albuterol  1 ampule Inhalation 4x daily    rifaximin 550 mg Per NG tube BID    lactulose  20 g Per NG tube 6 times per day    Empty Capsule #00 Purple/White  1 each Does not apply Prior to discharge     PRN Meds: sodium chloride, sodium chloride flush, sodium chloride, ondansetron **OR** ondansetron, acetaminophen **OR** acetaminophen, potassium chloride      Intake/Output Summary (Last 24 hours) at 9/15/2021 1109  Last data filed at 9/15/2021 1021  Gross per 24 hour   Intake 1472.41 ml   Output 900 ml   Net 572.41 ml       Physical Exam Performed:    BP (!) 133/95   Pulse 119   Temp 99.6 °F (37.6 °C) (Axillary)   Resp 25   Ht 5' 5\" (1.651 m)   Wt 181 lb 14.1 oz (82.5 kg)   SpO2 98%   BMI 30.27 kg/m²     General appearance: visibly jaundiced  HEENT: Pupils equal, round, and reactive to light. Has scleral icterus. Neck: Supple, with full range of motion. No jugular venous distention. Trachea midline. Respiratory:  Vented. . Clear to auscultation, bilaterally without Rales/Wheezes/Rhonchi. Cardiovascular: Regular rate and rhythm with normal S1/S2 without murmurs, rubs or gallops. Abdomen: Soft,  mildlyy distended. BS +   Musculoskeletal: No clubbing, cyanosis or edema bilaterally. Full range of motion without deformity. Skin: Skin color, texture, turgor normal.  No rashes or lesions. Neurologic: Intubated and sedated  Psychiatric: sedated  Capillary Refill: Brisk,3 seconds, normal   Peripheral Pulses: +2 palpable, equal bilaterally       Labs:   Recent Labs     09/13/21  0501 09/13/21  1430 09/14/21  0435 09/14/21  0435 09/14/21  1215 09/14/21  2230 09/15/21  0524   WBC 18.8*  --  19.7*  --   --   --  20.0*   HGB 6.9*   < > 7.8*   < > 7.8* 7.9* 8.1*   HCT 19.3*   < > 21.9*   < > 22.6* 22.9* 23.3*   PLT 69*  --  53*  --   --   --  55*    < > = values in this interval not displayed.      Recent Labs     09/13/21  0501 09/13/21  0501 09/13/21  1430 09/14/21  0435 09/14/21  1215 09/14/21  2230 09/15/21  0524      < > 135* 140  --   --  138   K 3.5   < >  --  2.7* 3.3* 3.3* 3.8   CL 99  --   --  102  --   --  103   CO2 21  --   --  20*  --   --  19*   BUN 58*  --   --  59*  --   --  67*   CREATININE 3.2*  --   --  3.3*  --   --  3.4*   CALCIUM 7.8*  --   --  8.2*  --   --  9.0   PHOS 4.7  --   --  4.6  --   --  4.0    < > = values in this interval not displayed. Recent Labs     09/13/21  0501 09/14/21  0435 09/15/21  0524   * 180* 220*   * 116* 120*   BILITOT 28.2* 27.1* 30.5*   ALKPHOS 202* 181* 184*     Recent Labs     09/13/21  1445 09/14/21  0435   INR 2.76* 2.94*     Recent Labs     09/13/21  0501 09/14/21  0435 09/15/21  0524   CKTOTAL 115 94 89       Urinalysis:      Lab Results   Component Value Date    NITRU Negative 09/09/2021    WBCUA 0-2 09/09/2021    BACTERIA 1+ 09/09/2021    RBCUA 3-4 09/09/2021    BLOODU TRACE-LYSED 09/09/2021    SPECGRAV 1.020 09/09/2021    GLUCOSEU 100 09/09/2021       Radiology:  XR CHEST PORTABLE   Final Result   Low lung volumes with bibasilar effusions. Support tubes as described above. XR CHEST PORTABLE   Final Result   Bibasilar hypoaeration again noted with persistent left basilar opacity   suggesting atelectasis         IR US GUIDED PARACENTESIS   Final Result   Successful ultrasound guided paracentesis. US GALLBLADDER RUQ   Final Result   Gallbladder wall is thickened. This is a nonspecific finding in the presence   of ascites as it may simply be reactive, rather than secondary to underlying   cholecystitis      Low level echoes are seen within the gallbladder, either stones or sludge. Cirrhosis with moderate ascites         XR CHEST PORTABLE   Final Result   New central venous catheter as above, approach is unclear. CT ABDOMEN PELVIS WO CONTRAST Additional Contrast? None   Final Result   1. Hepatic cirrhosis with associated portal venous hypertension and scattered   moderate intraperitoneal free fluid.    2. Diffuse gallbladder wall thickening suggesting association with chronic   versus acute cholecystitis. 3. Suspected gallbladder lumen dependent radiodense sludge. 4. Mild cardiomegaly. CT CHEST WO CONTRAST   Final Result   1. Hepatic cirrhosis with associated portal venous hypertension and scattered   moderate intraperitoneal free fluid. 2. Diffuse gallbladder wall thickening suggesting association with chronic   versus acute cholecystitis. 3. Suspected gallbladder lumen dependent radiodense sludge. 4. Mild cardiomegaly. CT Head WO Contrast   Final Result   No acute intracranial abnormality. XR CHEST PORTABLE   Final Result   Endotracheal tube and orogastric tube are acceptable. Bibasilar opacities are again noted. XR CHEST PORTABLE   Final Result   Reticulation at the left base more than right is nonspecific. Could relate   to subsegmental atelectasis, aspiration pneumonitis, or early pneumonia. Assessment/Plan:    Active Hospital Problems    Diagnosis     Hepatic encephalopathy ([address] [K72.90]     Hyperammonemia ([address] [E72.20]     Elevated lactic acid level [X85.21]     Metabolic acidosis, increased anion gap (IAG) [E87.2]     Elevated troponin [R77.8]     Hypokalemia [E87.6]     Hyponatremia [E87.1]     Hypotension [I95.9]     Anemia [D64.9]     GI bleed [K92.2]     Transaminitis [R74.01]        Acute respiratory failure/pneumonia  Secondary to hepatic encephalopathy and ascites. He remains intubated and sedated, CCM on board   Growing stenotrophomonas in sputum currently on Levaquin    Hepatic encephalopathy  Appears to be end-stage cirrhosis at this time or at minimum decompensated cirrhosis. Ammonia 72>86, on lactulose and rifaximin, GI on board. Elevated liver functions and jaundice  Appears to be related to alcoholic cirrhosis  Unclear if the patient has any hepatitis history.   GI is following    Acute metabolic acidosis  Appears to be a combination of acute kidney injury possible sepsis lactic acidosis and decompensated cirrhosis. We are working to correct the patient's abnormalities  Nephrology is following as well, had albumin, no urgent RRT   Creatinine 3.4    Acute kidney injury  Secondary to all of the above, Cr 3.4  Nephrology is following as noted above  Continue to monitor renal function    Anemia with possible GI bleed  Patient has been started on octreotide  He is on PPI as well  Gastroenterology has been consulted for his cirrhosis as well as for the possible bleed. GI has determined that this is secondary to hemorrhoids General surgery was consulted it would be more dangerous to do a surgery for the hemorrhoid then it would be to just allow it to ooze a little bit.   Banding would also be more dangerous     Thrombocytopenia  Secondary to liver disease    DVT Prophylaxis: SCDs  Diet: Diet NPO  ADULT TUBE FEEDING; Orogastric; Standard without Fiber; Continuous; 20; Yes; 25; Q 4 hours; 70; 30; Q 4 hours  Code Status: Full Code    PT/OT Eval Status: When appropriate    Dispo -patient is critically ill medical decision making is high    35 minutes of critical care time spent    Maggy Lafleur MD

## 2021-09-15 NOTE — PROGRESS NOTES
09/15/21 0846   Vent Patient Data   Plateau Pressure 36 ZIF94   Static Compliance 17.5 mL/cmH2O   Dynamic Compliance 30 mL/cmH2O

## 2021-09-15 NOTE — PROGRESS NOTES
Office : [phone]     Fax :[phone]       Nephrology  Progress Note      Patient's Name: Mera Green  11:20 AM  9/15/2021    Reason for Consult:  CHAO , Hyponatremia     Requesting Physician: Dr. Estefany Mckeon. Chief Complaint:    Chief Complaint   Patient presents with    Altered Mental Status     pt brought to ed by [facility] after being found by family sitting in chair unresponsive but breahing, bs for squad was 79 1 amp given in route patient moaning at triage          History of Present iIlness:    Mera Green is a gentleman with unknown age possible history of cirrhosis who apparently presented with family having been found by family poorly responsive who initiated CPR. EMS was called and on arrival to the ER patient was intubated. Post intubation he developed hypotension requiring fluid resuscitation with some improvement. Laboratory work-up was noted for ammonia of 136, lipase of 166, troponin of 0.10, sodium 122, potassium 2.9, CO2 of 18, creatinine of 2.0 and transaminitis with ,   CT chest abdomen pelvis revealed cirrhosis with portal hypertension and diffuse gallbladder thickening concerning for acute versus chronic cholecystitis with gallbladder sludge  CT brain with no acute intracranial pathology  EKG sinus rhythm  Urinalysis negative  History has been gathered from medical records      Latest sodium level is 119. Interval hx       Intubated on vent support     On pressor support    BUN and creat remains elevated but stable     UOP in non oliguric range         I/O last 3 completed shifts: In: 1488.1 [I.V.:428.7; NG/GT:708; IV Piggyback:351.5]  Out: 900 [Urine:900]    No past medical history on file.     Past Surgical History: Procedure Laterality Date    COLONOSCOPY N/A 9/13/2021    COLONOSCOPY DIAGNOSTIC performed by Kell Nolen MD at [facility] N/A 9/13/2021    EGD DIAGNOSTIC ONLY performed by Kell Nolen MD at [address] [place] [address]       No family history on file. Allergies:  Patient has no known allergies. Current Medications:    pantoprazole (PROTONIX) injection 40 mg, BID  levoFLOXacin (LEVAQUIN) 750 MG/150ML infusion 750 mg, Q48H  0.9 % sodium chloride infusion, PRN  sodium chloride flush 0.9 % injection 5-40 mL, 2 times per day  sodium chloride flush 0.9 % injection 5-40 mL, PRN  0.9 % sodium chloride infusion, PRN  ondansetron (ZOFRAN-ODT) disintegrating tablet 4 mg, Q8H PRN   Or  ondansetron (ZOFRAN) injection 4 mg, Q6H PRN  acetaminophen (TYLENOL) tablet 650 mg, Q6H PRN   Or  acetaminophen (TYLENOL) suppository 650 mg, Q6H PRN  ipratropium-albuterol (DUONEB) nebulizer solution 1 ampule, 4x daily  potassium chloride 20 mEq/50 mL IVPB (Central Line), PRN  rifaximin (XIFAXAN) tablet 550 mg, BID  lactulose (CHRONULAC) 10 GM/15ML solution 20 g, 6 times per day  norepinephrine (LEVOPHED) 16 mg in dextrose 5% 250 mL infusion, Continuous  PATIENT HOME MEDS STORED IN PHARMACY!!!!, Prior to discharge  fentaNYL 10 mcg/mL infusion, Continuous        Physical exam:     Vitals:  BP (!) 133/95   Pulse 119   Temp 99.6 °F (37.6 °C) (Axillary)   Resp 25   Ht 5' 5\" (1.651 m)   Wt 181 lb 14.1 oz (82.5 kg)   SpO2 98%   BMI 30.27 kg/m²   Constitutional:  Intubated   Skin: no rash, turgor wnl  Heent:   Icterus +  Neck: no bruits or jvd noted  Cardiovascular:  S1, S2 without m/r/g  Respiratory: CTA B without w/r/r  Abdomen:  +bs, soft, nt, nd  Ext: no  lower extremity edema  Psychiatric: mood and affect appropriate  Musculoskeletal:  Rom, muscular strength intact    Labs:  CBC:   Recent Labs     09/13/21  0501 09/13/21  1430 09/14/21  0435 09/14/21  0435 09/14/21  1215 09/14/21  2230 09/15/21  0524   WBC 18.8*  --  19.7*  --   --   --  20.0*   HGB 6.9*   < > 7.8*   < > 7.8* 7.9* 8.1*   PLT 69*  --  53*  --   --   --  55*    < > = values in this interval not displayed. BMP:    Recent Labs     09/13/21  0501 09/13/21  0501 09/13/21 1430 09/14/21 0435 09/14/21  1215 09/14/21  2230 09/15/21  0524      < > 135* 140  --   --  138   K 3.5   < >  --  2.7* 3.3* 3.3* 3.8   CL 99  --   --  102  --   --  103   CO2 21  --   --  20*  --   --  19*   BUN 58*  --   --  59*  --   --  67*   CREATININE 3.2*  --   --  3.3*  --   --  3.4*   GLUCOSE 99  --   --  92  --   --  128*    < > = values in this interval not displayed. Ca/Mg/Phos:   Recent Labs     09/13/21  0501 09/13/21  0501 09/13/21  1430 09/14/21  0435 09/15/21  0524   CALCIUM 7.8*  --   --  8.2* 9.0   MG 2.10   < > 2.20 2.20 2.20   PHOS 4.7  --   --  4.6 4.0    < > = values in this interval not displayed. Hepatic:   Recent Labs     09/13/21  0501 09/14/21  0435 09/15/21  0524   * 180* 220*   * 116* 120*   BILITOT 28.2* 27.1* 30.5*   ALKPHOS 202* 181* 184*     Troponin:   No results for input(s): TROPONINI in the last 72 hours. BNP: No results for input(s): BNP in the last 72 hours. Lipids: No results for input(s): CHOL, TRIG, HDL, LDLCALC, LABVLDL in the last 72 hours. ABGs:   Recent Labs     09/15/21  0539   PHART 7.341*   PO2ART 159.0*   YCG6BKN 35.9     INR:   Recent Labs     09/13/21  1445 09/14/21  0435   INR 2.76* 2.94*     UA:  No results for input(s): Alice Ogren, GLUCOSEU, BILIRUBINUR, KETUA, SPECGRAV, BLOODU, PHUR, PROTEINU, UROBILINOGEN, NITRU, LEUKOCYTESUR, Yolanda Don in the last 72 hours. Urine Microscopic:   No results for input(s): LABCAST, BACTERIA, COMU, HYALCAST, WBCUA, RBCUA, EPIU in the last 72 hours. Urine Culture: No results for input(s): LABURIN in the last 72 hours.   Urine Chemistry:   No results for input(s): Debbe Ao, Sindhu Kristie in the last 72 hours.             IMAGING:  XR CHEST PORTABLE   Final Result   Low lung volumes with bibasilar effusions. Support tubes as described above. XR CHEST PORTABLE   Final Result   Bibasilar hypoaeration again noted with persistent left basilar opacity   suggesting atelectasis         IR US GUIDED PARACENTESIS   Final Result   Successful ultrasound guided paracentesis. US GALLBLADDER RUQ   Final Result   Gallbladder wall is thickened. This is a nonspecific finding in the presence   of ascites as it may simply be reactive, rather than secondary to underlying   cholecystitis      Low level echoes are seen within the gallbladder, either stones or sludge. Cirrhosis with moderate ascites         XR CHEST PORTABLE   Final Result   New central venous catheter as above, approach is unclear. CT ABDOMEN PELVIS WO CONTRAST Additional Contrast? None   Final Result   1. Hepatic cirrhosis with associated portal venous hypertension and scattered   moderate intraperitoneal free fluid. 2. Diffuse gallbladder wall thickening suggesting association with chronic   versus acute cholecystitis. 3. Suspected gallbladder lumen dependent radiodense sludge. 4. Mild cardiomegaly. CT CHEST WO CONTRAST   Final Result   1. Hepatic cirrhosis with associated portal venous hypertension and scattered   moderate intraperitoneal free fluid. 2. Diffuse gallbladder wall thickening suggesting association with chronic   versus acute cholecystitis. 3. Suspected gallbladder lumen dependent radiodense sludge. 4. Mild cardiomegaly. CT Head WO Contrast   Final Result   No acute intracranial abnormality. XR CHEST PORTABLE   Final Result   Endotracheal tube and orogastric tube are acceptable. Bibasilar opacities are again noted. XR CHEST PORTABLE   Final Result   Reticulation at the left base more than right is nonspecific.   Could relate   to subsegmental atelectasis, aspiration pneumonitis, or early pneumonia. Assessment/Plan :      1. CHAO 2/2 ATN from hypotension / shock. Anemia   Renal function stable   UOP improved   Recommend to dose adjust all medications  based on renal functions  Maintain SBP> 90 mmHg   Daily weights   AVOID NSAIDs  Avoid Nephrotoxins  Monitor Intake/Output  Call if significant decrease in urine output     2. Hyponatremia. Improved. Continue to monitor. 3.  Hypotension/ shock state   On levophed   Give albumin as needed     4. High anion gap acidosis. Resolved. 5. Hypokalemia - replace iv.     6. Hepatic failure. Encephalopathy   GI following. S/p  FFP. On PPI. 7. Anemia. Hb is 6.9.    S/p PRBC   Hb today 8.1       Recommend to dose adjust all medications  based on renal functions  Maintain SBP> 90 mmHg   Daily weights   AVOID NSAIDs  Avoid Nephrotoxins  Monitor Intake/Output  Call if significant decrease in urine output               Thank you for allowing us to participate in care of Amado Negrete         Electronically signed by: Primitivo Black MD, 9/15/2021, 11:20 AM      [facility] [address]  Office : [phone]  Fax :[phone]

## 2021-09-15 NOTE — PROGRESS NOTES
BP drop systolic 25/75 Levophed started and Vasopressin. Bp stable. Pt. To CT when stable. Systolic BP in 324'H. Tube feeds remain off, 1400 residual 300. Family in aware pt. Very sick and on life support respiratory and BP. Pt. Arouses restless at times. Pt. Remains in restraints for his safety.

## 2021-09-15 NOTE — PROGRESS NOTES
Assessment complete, VSS. Pt tolerating vent with occasional coughing. Pt responds to pain and new confusion. PERRL, gag present. ETT #8, 25 cm at lip. Vent settings: AC/VC 14, , PEEP 5, FIO2 30%. Pulses present in all extremities. Abdomen distended with ascites. Bowel sounds active. OG in place at 57 cm. Tube feed osmolite 1.2 running at initial rate of 20 ml/hr with water flush 30q4. Restraints continued, no injury noted, ROM performed. Pt repositioned.

## 2021-09-15 NOTE — PROGRESS NOTES
[facility] Pulmonary/CCM Progress note      Admit Date: 9/9/2021    Chief Complaint: Altered mental status    Subjective: Interval History: Patient is making slow progress. Mentation remains poor. Still on full mechanical ventilatory support. Underwent EGD yesterday, which showed nonbleeding esophageal varices. Also underwent therapeutic paracentesis yesterday. Still requiring norepinephrine drip which is trending down. Respiratory culture growing stenotrophomonas. Scheduled Meds:   pantoprazole  40 mg IntraVENous BID    levofloxacin  750 mg IntraVENous Q48H    sodium chloride flush  5-40 mL IntraVENous 2 times per day    ipratropium-albuterol  1 ampule Inhalation 4x daily    rifaximin  550 mg Per NG tube BID    lactulose  20 g Per NG tube 6 times per day    Empty Capsule #00 Purple/White  1 each Does not apply Prior to discharge     Continuous Infusions:   sodium chloride      sodium chloride Stopped (09/14/21 0617)    norepinephrine 6 mcg/min (09/14/21 1416)    fentaNYL Stopped (09/14/21 1350)     PRN Meds:sodium chloride, sodium chloride flush, sodium chloride, ondansetron **OR** ondansetron, acetaminophen **OR** acetaminophen, potassium chloride    Review of Systems  Unable to obtain since patient is currently intubated and sedated    Objective:     I/O last 3 completed shifts: In: 1725.6 [I.V.:1012.8; IV Piggyback:712.8]  Out: 7653 [LXJID:1921; Emesis/NG output:800]  No intake/output data recorded.     General Appearance: Intubated and sedated, in no acute distress  Skin: warm and dry, no rash or erythema  Head: normocephalic and atraumatic  Eyes: pupils equal, round, and reactive to light, extraocular eye movements intact, conjunctivae normal  ENT: external ear and ear canal normal bilaterally, nose without deformity, nasal mucosa and turbinates normal  Neck: supple and non-tender without mass, no cervical lymphadenopathy  Pulmonary/Chest: clear to auscultation bilaterally- no wheezes, rales or rhonchi, normal air movement, no respiratory distress  Cardiovascular: normal rate, regular rhythm,  no murmurs, rubs, distal pulses intact, no carotid bruits  Abdomen: soft, non-tender, non-distended, normal bowel sounds, no masses or organomegaly  Lymph Nodes: Cervical, supraclavicular normal  Extremities: no cyanosis, clubbing or edema  Musculoskeletal: normal range of motion, no joint swelling, deformity or tenderness  Neurologic: Sedated, no focal neurologic deficits    Data Review:  LABS:  Recent Labs     09/12/21 0530 09/12/21  1500 09/13/21  0501 09/13/21  0501 09/13/21  1430 09/13/21  1445 09/14/21  0435 09/14/21  1215   WBC 23.0*  --  18.8*  --   --   --  19.7*  --    HGB 7.9*   < > 6.9*   < > 7.6*  --  7.8* 7.8*   HCT 22.5*   < > 19.3*   < > 21.9*  --  21.9* 22.6*   PLT 80*  --  69*  --   --   --  53*  --    *  --  153*  --   --   --  116*  --    *  --  215*  --   --   --  180*  --    *   < > 136  --  135*  --  140  --    K 3.7  --  3.5  --   --   --  2.7* 3.3*   CL 98*  --  99  --   --   --  102  --    CREATININE 2.0*  --  3.2*  --   --   --  3.3*  --    BUN 54*  --  58*  --   --   --  59*  --    CO2 21  --  21  --   --   --  20*  --    INR 2.85*  --   --   --   --  2.76* 2.94*  --     < > = values in this interval not displayed. Recent Labs     09/12/21 0530 09/12/21  1500 09/13/21  0501 09/13/21  1430 09/14/21  0435 09/14/21  1215   GLUCOSE 85  --  99  --  92  --    CALCIUM 7.5*  --  7.8*  --  8.2*  --    *   < > 136 135* 140  --    K 3.7  --  3.5  --  2.7* 3.3*   CO2 21  --  21  --  20*  --    CL 98*  --  99  --  102  --    BUN 54*  --  58*  --  59*  --    CREATININE 2.0*  --  3.2*  --  3.3*  --     < > = values in this interval not displayed.        Recent Labs     09/12/21  0425 09/13/21  0458 09/14/21  0435   PHART 7.331* 7.344* 7.339*   QGV5LGI 40.7 39.2 39.9   PO2ART 112.0* 171.0* 153.0*   PLO0PPL 21.5 21.3 21.4   I2IEACHY 98.7 100.0 99.8   BEART -4.1* -4.0* -4.0*       Lab Results   Component Value Date    INR 2.94 (H) 09/14/2021    INR 2.76 (H) 09/13/2021    INR 2.85 (H) 09/12/2021    PROTIME 34.8 (H) 09/14/2021    PROTIME 32.5 (H) 09/13/2021    PROTIME 33.7 (H) 09/12/2021     No results found for: AMYLASE   No results found for: LABA1C  No results found for: EAG  No results found for: TSH, G3PNJJO, P7VCSGJ, THYROIDAB, FT3, T4FREE  Lab Results   Component Value Date    CKTOTAL 94 09/14/2021    TROPONINI 0.09 (H) 09/10/2021      No results found for: CRP   No results found for: BNP   No results found for: DDIMER   No results found for: FERRITIN   Lab Results   Component Value Date    LACTA 1.1 09/14/2021       Radiology: All pertinent images / reports were reviewed as a part of this visit. Narrative   EXAMINATION:   CT OF THE CHEST WITHOUT CONTRAST; CT OF THE ABDOMEN AND PELVIS WITHOUT   CONTRAST 9/10/2021 12:06 am; 9/10/2021 12:07 am       TECHNIQUE:   CT of the chest was performed without the administration of intravenous   contrast. Multiplanar reformatted images are provided for review. Dose   modulation, iterative reconstruction, and/or weight based adjustment of the   mA/kV was utilized to reduce the radiation dose to as low as reasonably   achievable.; CT of the abdomen and pelvis was performed without the   administration of intravenous contrast. Multiplanar reformatted images are   provided for review. Dose modulation, iterative reconstruction, and/or weight   based adjustment of the mA/kV was utilized to reduce the radiation dose to as   low as reasonably achievable.       COMPARISON:   None.       HISTORY:   ORDERING SYSTEM PROVIDED HISTORY: AMS   TECHNOLOGIST PROVIDED HISTORY:   Reason for exam:->AMS   Decision Support Exception - unselect if not a suspected or confirmed   emergency medical condition->Emergency Medical Condition (MA)   Reason for Exam: AMS   Acuity: Acute   Type of Exam: Initial   Relevant Medical/Surgical History:  Altered Mental Status (pt brought to ed by   William Rodrigez after being found by family sitting in chair unresponsive but   breaching, BS for squad was 79 1 amp given in route patient moaning at triage   ); ORDERING SYSTEM PROVIDED HISTORY: AMS, jaundice   TECHNOLOGIST PROVIDED HISTORY:   Reason for exam:->AMS, jaundice   Additional Contrast?->None   Decision Support Exception - unselect if not a suspected or confirmed   emergency medical condition->Emergency Medical Condition (MA)   Reason for Exam: AMS, jaundice   Acuity: Acute   Type of Exam: Initial   Relevant Medical/Surgical History: Altered Mental Status (pt brought to ed by   [facility] after being found by family sitting in chair unresponsive but   breaching, BS for squad was 79 1 amp given in route patient moaning at triage   )       FINDINGS:       Chest:       Mediastinum: The heart is normal in size and configuration.  No evidence of   pericardial effusion is seen.  No evidence of mediastinal or hilar   lymphadenopathy or mass lesion is identified.       Lungs/pleura: The lungs are well aerated without evidence of consolidation. The pleural surfaces are unremarkable without evidence of pleural thickening   or pleural effusion identified.  The heart is mildly enlarged.       Soft Tissues/Bones: The bones, skeletal muscle bundles, fascial planes and   subcutaneous soft tissues are unremarkable in appearance.           Abdomen/Pelvis:       Organs: Nodular contour of the liver is present.  Associated enlargement of   the main portal vein is seen which measures up to 15 mm in transluminal   diameter.  Diffuse gallbladder wall thickening is identified which measures   up to 7 mm.  Dependent sludge is seen within the gallbladder lumen.  The   liver, gallbladder, spleen, pancreas, adrenal glands, kidneys, are   unremarkable in appearance.       GI/Bowel: The stomach is unremarkable without wall thickening or distention.    Bowel loops are unremarkable in appearance without evidence of obstruction,   distension or mucosal thickening.       Pelvis: Urinary bladder is minimally distending in setting of Peterson catheter   placement and is grossly unremarkable in appearance.  No evidence of pelvic   free fluid is seen.       Peritoneum/Retroperitoneum: No evidence of retroperitoneal or intraperitoneal   lymphadenopathy is identified.  Scattered moderate intraperitoneal free fluid   is visualized. .       Bones/Soft Tissues: The bones, skeletal muscle bundles, fascial planes and   subcutaneous soft tissues are unremarkable in appearance.           Impression   1. Hepatic cirrhosis with associated portal venous hypertension and scattered   moderate intraperitoneal free fluid. 2. Diffuse gallbladder wall thickening suggesting association with chronic   versus acute cholecystitis. 3. Suspected gallbladder lumen dependent radiodense sludge. 4. Mild cardiomegaly. Problem List:     Hepatic encephalopathy  Acute versus chronic cholecystitis  Alcoholic cirrhosis with hepatitis  Hyperammonemia  Hypovolemic shock  Anemia  Nonbleeding esophageal varices  Hemorrhoids  Hyponatremia/CHAO    Assessment/Plan:     Patient is currently intubated for airway protection. ABG showing stable gas exchange.  -Currently on full mechanical ventilatory support. I have changed the patient to pressure support ventilation with settings of 15/5 cm H2O and FiO2 of 40%. Continue with the settings as long as the patient can tolerate. Hepatic encephalopathy/hyperammonemia. Ammonia 136>64>75, bilirubin 25, >258, >183, alk phos 247>217. Diffuse gallbladder wall thickening ? Acute versus chronic cholecystitis with gall bladder sludge. Right upper quadrant ultrasound pretty much confirms the same. On IV Zosyn empirically. Now isolating stenotrophomonas from respiratory culture. We will change him to Levaquin. Not a candidate for Bactrim due to poor renal functions.   Lactulose and rifaximin for hyperammonemia with improvement. Currently on lactulose 20 g every 4 hours with rifaximin 550 mg twice daily. Underwent EGD yesterday with nonbleeding esophageal varices seen. Hemorrhoidal bleeding seen. General surgery consulted. No surgical interventions suggested. Still requiring Levophed support. Titrate to maintain SBP above 90 mmHg. Received 2 units of PRBC and 6 units of FFP so far. INR 2.85. Receiving daily vitamin K 10 mg IV. Underwent ultrasound-guided paracentesis yesterday. Hyponatremia/CHAO, possibly from volume depletion. Urine sodium <20, could point towards volume depletion versus HRS. Creatinine 3.3 but urine output has improved. .    Acidosis is resolved. Patient received albumin infusions for potential HRS. Potassium adequately corrected  Can restart the patient on tube feeds as per GI recommendations. On IV Protonix twice a day. Total critical care time caring for this patient with life threatening illness, including direct patient contact, management of life support systems, review of data including imaging and labs, discussions with other team members and physicians is at least 35 minutes so far today, excluding procedures.         Chelsea Christiansen MD   Pulmonary Critical Care and Sleep Medicine  [address] [facility],[address]   [address] [place], [address] [place] [address] Drive  9/9/2021, 8:03 PM

## 2021-09-15 NOTE — PROGRESS NOTES
[facility] GI  Gastroenterology Progress Note    Bonita Hayes is a 55 y.o. male patient. 1. Hepatic encephalopathy ([address]    2. Gastrointestinal hemorrhage, unspecified gastrointestinal hemorrhage type    3. Thickening of wall of gallbladder    4. Liver failure with hepatic coma, unspecified chronicity (HCC)        SUBJECTIVE:  intubated    ROS:  Cardiovascular ROS: intubated  Gastrointestinal ROS: intubated  Respiratory ROS: intubated    Physical    VITALS:  /78   Pulse 122   Temp 99.6 °F (37.6 °C) (Axillary)   Resp 14   Ht 5' 5\" (1.651 m)   Wt 181 lb 14.1 oz (82.5 kg)   SpO2 98%   BMI 30.27 kg/m²   TEMPERATURE:  Current - Temp: 99.6 °F (37.6 °C); Max - Temp  Av.9 °F (36.6 °C)  Min: 97 °F (36.1 °C)  Max: 99.6 °F (37.6 °C)    NAD  Regular rate  Lungs CTA Bilaterally  Abdomen soft, stable mild distended, Bowel sounds present  Sedated  Remains jaundiced. Data    CBC:   Lab Results   Component Value Date    WBC 20.0 09/15/2021    RBC 2.48 09/15/2021    HGB 8.1 09/15/2021    HCT 23.3 09/15/2021    MCV 93.8 09/15/2021    MCH 32.6 09/15/2021    MCHC 34.8 09/15/2021    RDW 18.6 09/15/2021    PLT 55 09/15/2021    MPV 9.0 09/15/2021     Hepatic Function Panel:    Lab Results   Component Value Date    ALKPHOS 184 09/15/2021     09/15/2021     09/15/2021    PROT 4.6 09/15/2021    BILITOT 30.5 09/15/2021           ASSESSMENT / PLAN :    Alcoholic hepatitis, cirrhosis -. He was initially unnamed but later found out that he came to [facility] weeks go as Scheryl Stammer  64 (not his real name and ; he in undocumented). He was discharged 2021 with severe alcoholic hepatitis with TB 28. He was discharged on prednisolone. In the ER, he was found to be hypotensive and required intubation  Bilirubin 27, , , INR 4. Ammonia 136. All concerning for liver failure. Undetectable acetaminophen, salicylate, and alcohol levels.  viral hepatitis panel normal. 9/15/21 bili [facility]    Addend: spoke with nurse that had been off pressor but now pressure dropped and high ng residual so restarting pressors and scanning abdomen.

## 2021-09-16 NOTE — PROGRESS NOTES
[facility] Pulmonary/CCM Progress note      Admit Date: 9/9/2021    Chief Complaint: Altered mental status    Subjective: Interval History: Patient continues to remain critically ill. On full mechanical ventilatory support. In septic shock requiring multiple vasopressor support. Has also follow-up ileus. Mentation remains poor. No further GI bleed seen. Respiratory culture growing stenotrophomonas as well as mold. Poor renal function. Remains coagulopathic and seen to have rectal bleeding. Scheduled Meds:   voriconazole  6 mg/kg IntraVENous Q12H    Followed by   Karolina Becerra ON 9/17/2021] voriconazole  2 mg/kg IntraVENous Q12H    pantoprazole  40 mg IntraVENous BID    levofloxacin  750 mg IntraVENous Q48H    sodium chloride flush  5-40 mL IntraVENous 2 times per day    ipratropium-albuterol  1 ampule Inhalation 4x daily    rifaximin  550 mg Per NG tube BID    lactulose  20 g Per NG tube 6 times per day    Empty Capsule #00 Purple/White  1 each Does not apply Prior to discharge     Continuous Infusions:   dexmedetomidine 0.8 mcg/kg/hr (09/16/21 1536)    vasopressin (Septic Shock) infusion Stopped (09/16/21 1100)    sodium chloride      sodium chloride 10 mL/hr at 09/15/21 2201    norepinephrine 6 mcg/min (09/16/21 1536)    fentaNYL 125 mcg/hr (09/16/21 1108)     PRN Meds:sodium chloride, sodium chloride flush, sodium chloride, ondansetron **OR** ondansetron, acetaminophen **OR** acetaminophen, potassium chloride    Review of Systems  Unable to obtain since patient is currently intubated and sedated    Objective:     I/O last 3 completed shifts:   In: 680.2 [I.V.:580.2; NG/GT:100]  Out: 2450 [Urine:2150; Emesis/NG output:300]  I/O this shift:  In: 695.8 [I.V.:345.6; IV Piggyback:350.2]  Out: 1100 [Urine:1100]    General Appearance: Intubated and sedated, in no acute distress  Skin: warm and dry, no rash or erythema generalized icterus seen  Head: normocephalic and atraumatic  Eyes: pupils equal, round, and reactive to light, extraocular eye movements intact, conjunctivae normal  ENT: external ear and ear canal normal bilaterally, nose without deformity, nasal mucosa and turbinates normal  Neck: supple and non-tender without mass, no cervical lymphadenopathy  Pulmonary/Chest: clear to auscultation bilaterally- no wheezes, rales or rhonchi, normal air movement, no respiratory distress  Cardiovascular: normal rate, regular rhythm,  no murmurs, rubs, distal pulses intact, no carotid bruits  Abdomen: soft, non-tender, non-distended, normal bowel sounds, no masses or organomegaly  Lymph Nodes: Cervical, supraclavicular normal  Extremities: no cyanosis, clubbing or edema  Musculoskeletal: normal range of motion, no joint swelling, deformity or tenderness  Neurologic: Sedated, no focal neurologic deficits    Data Review:  LABS:  Recent Labs     09/14/21  0435 09/14/21  1215 09/14/21  2230 09/14/21  2230 09/15/21  0524 09/15/21  0524 09/15/21  1435 09/16/21  0550 09/16/21  1530   WBC 19.7*  --   --   --  20.0*  --   --  19.8*  --    HGB 7.8*   < > 7.9*   < > 8.1*   < > 7.2* 7.6* 7.8*   HCT 21.9*   < > 22.9*   < > 23.3*   < > 21.0* 21.8* 22.3*   PLT 53*  --   --   --  55*  --   --  50*  --    *  --   --   --  120*  --   --  114*  --    *  --   --   --  220*  --   --  161*  --      --   --   --  138  --   --  144  --    K 2.7*   < > 3.3*  --  3.8  --   --  3.3*  --      --   --   --  103  --   --  108  --    CREATININE 3.3*  --   --   --  3.4*  --   --  2.7*  --    BUN 59*  --   --   --  67*  --   --  75*  --    CO2 20*  --   --   --  19*  --   --  20*  --    INR 2.94*  --   --   --   --   --   --   --  2.88*    < > = values in this interval not displayed.        Recent Labs     09/14/21  0435 09/14/21  1215 09/14/21  2230 09/15/21  0524 09/16/21  0550   GLUCOSE 92  --   --  128* 149*   CALCIUM 8.2*  --   --  9.0 9.7     --   --  138 144   K 2.7*   < > 3.3* 3.8 3.3*   CO2 20*  --   -- 19* 20*     --   --  103 108   BUN 59*  --   --  67* 75*   CREATININE 3.3*  --   --  3.4* 2.7*    < > = values in this interval not displayed. Recent Labs     09/14/21  0435 09/15/21  0539 09/16/21  0550   PHART 7.339* 7.341* 7.345*   JSI7TPH 39.9 35.9 37.6   PO2ART 153.0* 159.0* 119.0*   MJZ0JIZ 21.4 19.4* 20.5*   P4GSTFFY 99.8 99.9 99.7   BEART -4.0* -5.8* -4.7*       Lab Results   Component Value Date    INR 2.88 (H) 09/16/2021    INR 2.94 (H) 09/14/2021    INR 2.76 (H) 09/13/2021    PROTIME 34.0 (H) 09/16/2021    PROTIME 34.8 (H) 09/14/2021    PROTIME 32.5 (H) 09/13/2021     No results found for: AMYLASE   No results found for: LABA1C  No results found for: EAG  No results found for: TSH, G8NLIXP, K9WUZEL, THYROIDAB, FT3, T4FREE  Lab Results   Component Value Date    CKTOTAL 51 09/16/2021    TROPONINI 0.09 (H) 09/10/2021      No results found for: CRP   No results found for: BNP   No results found for: DDIMER   No results found for: FERRITIN   Lab Results   Component Value Date    LACTA 1.2 09/16/2021       Radiology: All pertinent images / reports were reviewed as a part of this visit.     Narrative   EXAMINATION:   ONE XRAY VIEW OF THE CHEST       9/16/2021 7:20 am       FINDINGS:   Supportive devices are stable.  The heart is enlarged.  Mild vascular   congestion stable centrally.  Persistent small left pleural effusion with   improving effusion on the right.  Minimal bibasilar ground-glass opacities   appear slightly improved.  No skeletal finding.           Impression   Slightly improved aeration in the lower lung zones bilaterally.               Problem List:     Hepatic encephalopathy, worsening  Acute versus chronic cholecystitis  Alcoholic cirrhosis with hepatitis  Hyperammonemia, persistent  Hypovolemic versus septic shock  Ileus  Anemia  Nonbleeding esophageal varices  Hemorrhoids, actively bleeding  Hyponatremia/CHAO  Stenotrophomonas infection  Respiratory fungal infection    Assessment/Plan:     Patient is currently intubated for airway protection. ABG showing stable gas exchange. Currently on full mechanical ventilatory support. Unable to wean the ventilatory support due to labile hemodynamics, poor mentation and development of ileus. Hepatic encephalopathy/hyperammonemia. Ammonia 136>64>75>86> 76. Patient continues on lactulose and rifaximin as per GI recommendations. Has developed ileus. On NG tube decompression. Unable to place a rectal tube as patient has bleeding hemorrhoids and coagulopathy. Also has developed septic shock most likely due to stenotrophomonas and fungal infection in the lungs. Hypovolemic shock cannot be completely ruled out. Patient now on both Levophed and vasopressin. Titrate to maintain mean arterial pressure above 65 mmHg. Now isolating stenotrophomonas and mold from respiratory culture. Antibiotics changed to Levaquin. Will add hepatic dose voriconazole to the regimen. Underwent EGD earlier this admission with nonbleeding esophageal varices seen. Hemorrhoidal bleeding seen. General surgery consulted. No surgical interventions suggested. Received 2 units of PRBC and 6 units of FFP so far. INR 2.85. Receiving daily vitamin K 10 mg IV. Hemoglobin again trending down. Will transfuse if hemoglobin is below 7 g/dL. Underwent ultrasound-guided paracentesis earlier this admission. Hyponatremia/CHAO, possibly from volume depletion. Urine sodium <20, could point towards volume depletion versus HRS. Creatinine 3.3 but urine output has improved. .    Acidosis is resolved. Patient received albumin infusions for potential HRS. Potassium adequately corrected. Nephrology on board. Will follow the recommendation. Tube feeds on hold due to ileus. On IV Protonix twice a day. Patient has end-stage liver disease with recurrent ascites, severe portal hypertension.   Patient may benefit from liver transplant evaluation versus placement of TIPS for recurrent ascites. This can be provided at tertiary care center. But will be beneficial to reach out to [facility] to assess the potential to transfer this patient for higher care. I had an extensive discussion using a  with the patient's family and friends by the bedside today. Total critical care time caring for this patient with life threatening illness, including direct patient contact, management of life support systems, review of data including imaging and labs, discussions with other team members and physicians is at least 45 minutes so far today, excluding procedures.         Milena Harman MD   Pulmonary Critical Care and Sleep Medicine  [facility]   [address] [address], [place], [address] [place] [address] Drive  9/9/2021, 4:00 PM

## 2021-09-16 NOTE — PROGRESS NOTES
Brother at bedside. Wishes to video chat with patients wife and children during am rounds. Will have  service at bedside.

## 2021-09-16 NOTE — PROGRESS NOTES
Assessment complete, VSS. Pt tolerating vent with occasional coughing when stimulated. ETT #8, 25 cm at lip. AC/VC 14, , PEEP 5, FIO2 30%. Pt responds to voice and new confusion, pupil response sluggish, gag present, does not follow commands. Pulses present in all extremities. OG in place measuring 56 cm. Tube feed off. PO meds given after  bowel sounds auscultated. Restraints remain on, no injury noted, ROM performed. Pt repositioned.

## 2021-09-16 NOTE — PROGRESS NOTES
Comprehensive Nutrition Assessment    Type and Reason for Visit:  Reassess    Nutrition Recommendations/Plan:   1. Continue NPO  2. Monitor plan of care  3. Monitor for ability to resume EN vs need for TPN depending on overall status, prognosis and plan of care     Nutrition Assessment:  Pt remains on mechanical ventilation, sedated with precedex and fentanyl. Levophed at 4 mcg/min. Vaso at 0.03 units/min. Pt was started on Osmolite 1.2 on 9/14, but this was held 9/15 d/t residuals greater than 500 mL. CT showed ileus. EN remains on hold with NG to suction. LBM 9/13. Pt now with inadequate nutrition x 6 days during admission. Discussed possibility of TPN with Dr. Maris Orellana; he wishes to hold off at this time as plan of care and disposition are clarified as plan is to see if pt could be accepted at a tertiary hospital for consideration of liver transplant. Malnutrition Assessment:  Malnutrition Status: At risk for malnutrition (Comment)    Context:  Acute Illness       Estimated Daily Nutrient Needs:  Energy (kcal):  3637 - 2250; Weight Used for Energy Requirements:  Admission (75 kg (BMI 27.5 at admission))     Protein (g):  90 - 150; Weight Used for Protein Requirements:  Current (1.2 - 2.0g/75kg)        Fluid (ml/day):   ; Method Used for Fluid Requirements:  1 ml/kcal      Nutrition Related Findings:  K+ 3.3. +1 pitting BUE and BLE edema. Abdomen is distended. +Ascites. Hypoactive bowel sounds. +12 liters      Wounds:  None       Current Nutrition Therapies:    Diet NPO    Anthropometric Measures:  · Height: 5' 5\" (165.1 cm)  · Current Body Weight: 186 lb 4.6 oz (84.5 kg)   · Admission Body Weight: 164 lb 10.9 oz (74.7 kg)    · Ideal Body Weight: 136 lbs; % Ideal Body Weight 137 %   · BMI: 31  · BMI Categories: Obese Class 1 (BMI 30.0-34. 9)       Nutrition Diagnosis:   · Inadequate protein-energy intake related to altered GI function as evidenced by NPO or clear liquid status due to medical condition      Nutrition Interventions:   Food and/or Nutrient Delivery:  Continue NPO  Nutrition Education/Counseling:  Education not indicated   Coordination of Nutrition Care:  Continue to monitor while inpatient    Goals:  start of nutrition within next 24 - 48 hr       Nutrition Monitoring and Evaluation:   Behavioral-Environmental Outcomes:  None Identified   Food/Nutrient Intake Outcomes:  None Identified  Physical Signs/Symptoms Outcomes:  GI Status, Hemodynamic Status, Weight     Discharge Planning:     Too soon to determine     Electronically signed by Vinny Hdz, SHAQ, LD on 9/16/21 at 12:13 PM EDT    Contact: [phone]

## 2021-09-16 NOTE — PLAN OF CARE
Nutrition Problem #1: Inadequate protein-energy intake  Intervention: Food and/or Nutrient Delivery: Continue NPO  Nutritional Goals: start of nutrition within next 24 - 48 hr

## 2021-09-16 NOTE — PROGRESS NOTES
Office : [phone]     Fax :[phone]       Nephrology  Progress Note      Patient's Name: Marlin Hines  11:03 AM  9/16/2021    Reason for Consult:  CHAO , Hyponatremia     Requesting Physician: Dr. Daenne Mcclure. Chief Complaint:    Chief Complaint   Patient presents with    Altered Mental Status     pt brought to ed by [facility] after being found by family sitting in chair unresponsive but breahing, bs for squad was 79 1 amp given in route patient moaning at triage          History of Present iIlness:    Marlin Hines is a gentleman with unknown age possible history of cirrhosis who apparently presented with family having been found by family poorly responsive who initiated CPR. EMS was called and on arrival to the ER patient was intubated. Post intubation he developed hypotension requiring fluid resuscitation with some improvement. Laboratory work-up was noted for ammonia of 136, lipase of 166, troponin of 0.10, sodium 122, potassium 2.9, CO2 of 18, creatinine of 2.0 and transaminitis with ,   CT chest abdomen pelvis revealed cirrhosis with portal hypertension and diffuse gallbladder thickening concerning for acute versus chronic cholecystitis with gallbladder sludge  CT brain with no acute intracranial pathology  EKG sinus rhythm  Urinalysis negative  History has been gathered from medical records      Latest sodium level is 119. Interval hx       Intubated on vent support   UOP  Improving         I/O last 3 completed shifts: In: 1884 [I.V.:753; NG/GT:564; IV Piggyback:100]  Out: 2332 [Urine:1125; Emesis/NG output:300]    No past medical history on file.     Past Surgical History:   Procedure Laterality Date    COLONOSCOPY N/A 9/13/2021 COLONOSCOPY DIAGNOSTIC performed by Sheeba Vora MD at [facility] N/A 9/13/2021    EGD DIAGNOSTIC ONLY performed by Sheeba Vora MD at [facility]       No family history on file. Allergies:  Patient has no known allergies. Current Medications:    voriconazole (VFEND) 505 mg in dextrose 5 % 100 mL IVPB, Q12H   Followed by  [START ON 9/17/2021] voriconazole (VFEND) 170 mg in dextrose 5 % 100 mL IVPB, Q12H  dexmedetomidine (PRECEDEX) 400 mcg in sodium chloride 0.9 % 100 mL infusion, Continuous  vasopressin 20 Units in dextrose 5 % 100 mL infusion, Continuous  pantoprazole (PROTONIX) injection 40 mg, BID  levoFLOXacin (LEVAQUIN) 750 MG/150ML infusion 750 mg, Q48H  0.9 % sodium chloride infusion, PRN  sodium chloride flush 0.9 % injection 5-40 mL, 2 times per day  sodium chloride flush 0.9 % injection 5-40 mL, PRN  0.9 % sodium chloride infusion, PRN  ondansetron (ZOFRAN-ODT) disintegrating tablet 4 mg, Q8H PRN   Or  ondansetron (ZOFRAN) injection 4 mg, Q6H PRN  acetaminophen (TYLENOL) tablet 650 mg, Q6H PRN   Or  acetaminophen (TYLENOL) suppository 650 mg, Q6H PRN  ipratropium-albuterol (DUONEB) nebulizer solution 1 ampule, 4x daily  potassium chloride 20 mEq/50 mL IVPB (Central Line), PRN  rifaximin (XIFAXAN) tablet 550 mg, BID  lactulose (CHRONULAC) 10 GM/15ML solution 20 g, 6 times per day  norepinephrine (LEVOPHED) 16 mg in dextrose 5% 250 mL infusion, Continuous  PATIENT HOME MEDS STORED IN PHARMACY!!!!, Prior to discharge  fentaNYL 10 mcg/mL infusion, Continuous        Physical exam:     Vitals:  /82   Pulse 86   Temp 98 °F (36.7 °C) (Temporal)   Resp 12   Ht 5' 5\" (1.651 m)   Wt 186 lb 4.6 oz (84.5 kg)   SpO2 99%   BMI 31.00 kg/m²   Constitutional:  Intubated   Skin: no rash, turgor wnl  Heent:   Icterus +  Neck: no bruits or jvd noted  Cardiovascular:  S1, S2 without m/r/g  Respiratory: CTA B without w/r/r  Abdomen:  +bs, soft, nt, nd  Ext: no  lower extremity edema  Psychiatric: mood and affect appropriate  Musculoskeletal:  Rom, muscular strength intact    Labs:  CBC:   Recent Labs     09/14/21  0435 09/14/21  1215 09/15/21  0524 09/15/21  1435 09/16/21  0550   WBC 19.7*  --  20.0*  --  19.8*   HGB 7.8*   < > 8.1* 7.2* 7.6*   PLT 53*  --  55*  --  50*    < > = values in this interval not displayed. BMP:    Recent Labs     09/14/21  0435 09/14/21  1215 09/14/21  2230 09/15/21  0524 09/16/21  0550     --   --  138 144   K 2.7*   < > 3.3* 3.8 3.3*     --   --  103 108   CO2 20*  --   --  19* 20*   BUN 59*  --   --  67* 75*   CREATININE 3.3*  --   --  3.4* 2.7*   GLUCOSE 92  --   --  128* 149*    < > = values in this interval not displayed. Ca/Mg/Phos:   Recent Labs     09/14/21  0435 09/15/21  0524 09/16/21  0550   CALCIUM 8.2* 9.0 9.7   MG 2.20 2.20 2.30   PHOS 4.6 4.0 4.1     Hepatic:   Recent Labs     09/14/21  0435 09/15/21  0524 09/16/21  0550   * 220* 161*   * 120* 114*   BILITOT 27.1* 30.5* 31.1*   ALKPHOS 181* 184* 182*     Troponin:   No results for input(s): TROPONINI in the last 72 hours. BNP: No results for input(s): BNP in the last 72 hours. Lipids: No results for input(s): CHOL, TRIG, HDL, LDLCALC, LABVLDL in the last 72 hours. ABGs:   Recent Labs     09/16/21  0550   PHART 7.345*   PO2ART 119.0*   [id number] 37.6     INR:   Recent Labs     09/13/21  1445 09/14/21  0435   INR 2.76* 2.94*       IMAGING:  XR CHEST PORTABLE   Final Result   Slightly improved aeration in the lower lung zones bilaterally. CT ABDOMEN PELVIS WO CONTRAST Additional Contrast? None   Final Result   1. Diffuse gastrointestinal distension, likely due to ileus   2. Fluid throughout the colon and rectum. Correlate with any history of   diarrhea. No evident colonic wall thickening to indicate colitis   3. Cirrhosis with small ascites   4. Stones versus sludge in the gallbladder   5.  Atelectasis or pneumonia in the lung bases with small left pleural effusion         XR CHEST PORTABLE   Final Result   Low lung volumes with bibasilar effusions. Support tubes as described above. XR CHEST PORTABLE   Final Result   Bibasilar hypoaeration again noted with persistent left basilar opacity   suggesting atelectasis         IR US GUIDED PARACENTESIS   Final Result   Successful ultrasound guided paracentesis. US GALLBLADDER RUQ   Final Result   Gallbladder wall is thickened. This is a nonspecific finding in the presence   of ascites as it may simply be reactive, rather than secondary to underlying   cholecystitis      Low level echoes are seen within the gallbladder, either stones or sludge. Cirrhosis with moderate ascites         XR CHEST PORTABLE   Final Result   New central venous catheter as above, approach is unclear. CT ABDOMEN PELVIS WO CONTRAST Additional Contrast? None   Final Result   1. Hepatic cirrhosis with associated portal venous hypertension and scattered   moderate intraperitoneal free fluid. 2. Diffuse gallbladder wall thickening suggesting association with chronic   versus acute cholecystitis. 3. Suspected gallbladder lumen dependent radiodense sludge. 4. Mild cardiomegaly. CT CHEST WO CONTRAST   Final Result   1. Hepatic cirrhosis with associated portal venous hypertension and scattered   moderate intraperitoneal free fluid. 2. Diffuse gallbladder wall thickening suggesting association with chronic   versus acute cholecystitis. 3. Suspected gallbladder lumen dependent radiodense sludge. 4. Mild cardiomegaly. CT Head WO Contrast   Final Result   No acute intracranial abnormality. XR CHEST PORTABLE   Final Result   Endotracheal tube and orogastric tube are acceptable. Bibasilar opacities are again noted. XR CHEST PORTABLE   Final Result   Reticulation at the left base more than right is nonspecific.   Could relate   to subsegmental atelectasis, aspiration pneumonitis, or early pneumonia. Assessment/Plan :      1. CHAO 2/2 ATN from hypotension / shock. Anemia   Renal function started to improve   UOP improved   Recommend to dose adjust all medications  based on renal functions  Maintain SBP> 90 mmHg   Daily weights   AVOID NSAIDs  Avoid Nephrotoxins  Monitor Intake/Output  Call if significant decrease in urine output     2. Hyponatremia. Improved. Continue to monitor. 3.  Hypotension/ shock state   On levophed   Give albumin as needed     4. High anion gap acidosis. Resolved. 5. Hypokalemia - replace iv.     6. Hepatic failure. Encephalopathy   GI following. S/p  FFP. On PPI. 7. Anemia. Hb is 6.9.    S/p PRBC   Hb today 7.6       Recommend to dose adjust all medications  based on renal functions  Maintain SBP> 90 mmHg   Daily weights   AVOID NSAIDs  Avoid Nephrotoxins  Monitor Intake/Output  Call if significant decrease in urine output               Thank you for allowing us to participate in care of Iman Barajas         Electronically signed by: Jovita Jameson MD, 9/16/2021, 11:03 AM      [facility] [address] [place] [address] S  Office : [phone]  Fax :[phone]

## 2021-09-16 NOTE — PROGRESS NOTES
Hospitalist Progress Note      PCP: No primary care provider on file. Date of Admission: 9/9/2021    Chief Complaint:     Altered Mental Status       pt brought to ed by [facility] after being found by family sitting in chair unresponsive but jill bs for squad was 79 1 amp given in route patient moaning at triage           Hospital Course:      No family at bedside to provide collateral history. History obtained from discussion with ER provider  Apparently file still being managed and has presented by different names in the past     The patient is a unknown y.o. adult with history of cirrhosis who apparently presented with family having been found by family poorly responsive who initiated CPR. EMS was called and on arrival to the ER patient was intubated. Post intubation he developed hypotension requiring fluid resuscitation with some improvement. Laboratory work-up was noted for ammonia of 136, lipase of 166, troponin of 0.10, sodium 122, potassium 2.9, CO2 of 18, creatinine of 2.0 and transaminitis with ,   CT chest abdomen pelvis revealed cirrhosis with portal hypertension and diffuse gallbladder thickening concerning for acute versus chronic cholecystitis with gallbladder sludge  CT brain with no acute intracranial pathology  EKG sinus rhythm  Urinalysis negative  He is notably jaundiced    Subjective: Intubated and sedated, hypokalemia, still on Levophed to 6 mics, FiO2 30%, ammonia 76.         Medications:  Reviewed    Infusion Medications    dexmedetomidine 0.3 mcg/kg/hr (09/16/21 0530)    vasopressin (Septic Shock) infusion Stopped (09/16/21 1100)    sodium chloride      sodium chloride 10 mL/hr at 09/15/21 2201    norepinephrine 9 mcg/min (09/16/21 1100)    fentaNYL 125 mcg/hr (09/16/21 1108)     Scheduled Medications    voriconazole  6 mg/kg IntraVENous Q12H    Followed by   Daniel Rodriguez ON 9/17/2021] voriconazole  2 mg/kg IntraVENous Q12H    pantoprazole  40 mg IntraVENous BID    levofloxacin  750 mg IntraVENous Q48H    sodium chloride flush  5-40 mL IntraVENous 2 times per day    ipratropium-albuterol  1 ampule Inhalation 4x daily    rifaximin  550 mg Per NG tube BID    lactulose  20 g Per NG tube 6 times per day    Empty Capsule #00 Purple/White  1 each Does not apply Prior to discharge     PRN Meds: sodium chloride, sodium chloride flush, sodium chloride, ondansetron **OR** ondansetron, acetaminophen **OR** acetaminophen, potassium chloride      Intake/Output Summary (Last 24 hours) at 9/16/2021 1131  Last data filed at 9/16/2021 0800  Gross per 24 hour   Intake 1052.99 ml   Output 2000 ml   Net -947.01 ml       Physical Exam Performed:    /82   Pulse 86   Temp 98 °F (36.7 °C) (Temporal)   Resp 12   Ht 5' 5\" (1.651 m)   Wt 186 lb 4.6 oz (84.5 kg)   SpO2 99%   BMI 31.00 kg/m²     General appearance: visibly jaundiced  HEENT: Pupils equal, round, and reactive to light. Has scleral icterus. Neck: Supple, with full range of motion. No jugular venous distention. Trachea midline. Respiratory:  Vented. . Clear to auscultation, bilaterally without Rales/Wheezes/Rhonchi. Cardiovascular: Regular rate and rhythm with normal S1/S2 without murmurs, rubs or gallops. Abdomen: Soft,  mildlyy distended. BS +   Musculoskeletal: No clubbing, cyanosis or edema bilaterally. Full range of motion without deformity. Skin: Skin color, texture, turgor normal.  No rashes or lesions. Neurologic: Intubated and sedated  Psychiatric: sedated  Capillary Refill: Brisk,3 seconds, normal   Peripheral Pulses: +2 palpable, equal bilaterally       Labs:   Recent Labs     09/14/21  0435 09/14/21  1215 09/15/21  0524 09/15/21  1435 09/16/21  0550   WBC 19.7*  --  20.0*  --  19.8*   HGB 7.8*   < > 8.1* 7.2* 7.6*   HCT 21.9*   < > 23.3* 21.0* 21.8*   PLT 53*  --  55*  --  50*    < > = values in this interval not displayed.      Recent Labs     09/14/21  0435 09/14/21  1216 09/14/21  2230 09/15/21  0524 09/16/21  0550     --   --  138 144   K 2.7*   < > 3.3* 3.8 3.3*     --   --  103 108   CO2 20*  --   --  19* 20*   BUN 59*  --   --  67* 75*   CREATININE 3.3*  --   --  3.4* 2.7*   CALCIUM 8.2*  --   --  9.0 9.7   PHOS 4.6  --   --  4.0 4.1    < > = values in this interval not displayed. Recent Labs     09/14/21  0435 09/15/21  0524 09/16/21  0550   * 220* 161*   * 120* 114*   BILITOT 27.1* 30.5* 31.1*   ALKPHOS 181* 184* 182*     Recent Labs     09/13/21  1445 09/14/21  0435   INR 2.76* 2.94*     Recent Labs     09/14/21 0435 09/15/21  0524 09/16/21  0550   CKTOTAL 94 89 51       Urinalysis:      Lab Results   Component Value Date    NITRU Negative 09/09/2021    WBCUA 0-2 09/09/2021    BACTERIA 1+ 09/09/2021    RBCUA 3-4 09/09/2021    BLOODU TRACE-LYSED 09/09/2021    SPECGRAV 1.020 09/09/2021    GLUCOSEU 100 09/09/2021       Radiology:  XR CHEST PORTABLE   Final Result   Slightly improved aeration in the lower lung zones bilaterally. CT ABDOMEN PELVIS WO CONTRAST Additional Contrast? None   Final Result   1. Diffuse gastrointestinal distension, likely due to ileus   2. Fluid throughout the colon and rectum. Correlate with any history of   diarrhea. No evident colonic wall thickening to indicate colitis   3. Cirrhosis with small ascites   4. Stones versus sludge in the gallbladder   5. Atelectasis or pneumonia in the lung bases with small left pleural effusion         XR CHEST PORTABLE   Final Result   Low lung volumes with bibasilar effusions. Support tubes as described above. XR CHEST PORTABLE   Final Result   Bibasilar hypoaeration again noted with persistent left basilar opacity   suggesting atelectasis         IR US GUIDED PARACENTESIS   Final Result   Successful ultrasound guided paracentesis. US GALLBLADDER RUQ   Final Result   Gallbladder wall is thickened.   This is a nonspecific finding in the presence   of ascites as it may simply be reactive, rather than secondary to underlying   cholecystitis      Low level echoes are seen within the gallbladder, either stones or sludge. Cirrhosis with moderate ascites         XR CHEST PORTABLE   Final Result   New central venous catheter as above, approach is unclear. CT ABDOMEN PELVIS WO CONTRAST Additional Contrast? None   Final Result   1. Hepatic cirrhosis with associated portal venous hypertension and scattered   moderate intraperitoneal free fluid. 2. Diffuse gallbladder wall thickening suggesting association with chronic   versus acute cholecystitis. 3. Suspected gallbladder lumen dependent radiodense sludge. 4. Mild cardiomegaly. CT CHEST WO CONTRAST   Final Result   1. Hepatic cirrhosis with associated portal venous hypertension and scattered   moderate intraperitoneal free fluid. 2. Diffuse gallbladder wall thickening suggesting association with chronic   versus acute cholecystitis. 3. Suspected gallbladder lumen dependent radiodense sludge. 4. Mild cardiomegaly. CT Head WO Contrast   Final Result   No acute intracranial abnormality. XR CHEST PORTABLE   Final Result   Endotracheal tube and orogastric tube are acceptable. Bibasilar opacities are again noted. XR CHEST PORTABLE   Final Result   Reticulation at the left base more than right is nonspecific. Could relate   to subsegmental atelectasis, aspiration pneumonitis, or early pneumonia.                  Assessment/Plan:    Active Hospital Problems    Diagnosis     Hepatic encephalopathy ([address] [K72.90]     Hyperammonemia ([address] [E72.20]     Elevated lactic acid level [O57.45]     Metabolic acidosis, increased anion gap (IAG) [E87.2]     Elevated troponin [R77.8]     Hypokalemia [E87.6]     Hyponatremia [E87.1]     Hypotension [I95.9]     Anemia [D64.9]     GI bleed [K92.2]     Transaminitis [R74.01]        Acute respiratory failure/pneumonia  Secondary to hepatic encephalopathy and ascites. He remains intubated and sedated, [facility] on board   Growing stenotrophomonas in sputum currently on Levaquin    Hepatic encephalopathy  Appears to be end-stage cirrhosis at this time or at minimum decompensated cirrhosis. Ammonia 72>86>76, on lactulose and rifaximin, GI on board. Elevated liver functions and jaundice  Appears to be related to alcoholic cirrhosis  Unclear if the patient has any hepatitis history. GI is following    Diffuse gastrointestinal distention likely due to ileus, on abdominal x-ray, NG tube, rectal tube per GI, can give lactulose rectal    Acute metabolic acidosis  Appears to be a combination of acute kidney injury possible sepsis lactic acidosis and decompensated cirrhosis. We are working to correct the patient's abnormalities  Nephrology is following as well, had albumin, no urgent RRT   Creatinine 3.4    Acute kidney injury  Secondary to all of the above, Cr 3.4>2.7  Nephrology is following as noted above  Continue to monitor renal function     Anemia with possible GI bleed  Patient has been started on octreotide  He is on PPI as well  Gastroenterology has been consulted for his cirrhosis as well as for the possible bleed. GI has determined that this is secondary to hemorrhoids General surgery was consulted it would be more dangerous to do a surgery for the hemorrhoid then it would be to just allow it to ooze a little bit.   Banding would also be more dangerous     Thrombocytopenia  Secondary to liver disease    DVT Prophylaxis: SCDs  Diet: Diet NPO  ADULT TUBE FEEDING; Orogastric; Standard without Fiber; Continuous; 20; Yes; 25; Q 4 hours; 70; 30; Q 4 hours  Code Status: Full Code    PT/OT Eval Status: When appropriate    Dispo -patient is critically ill medical decision making is high    35 minutes of critical care time spent    Felecia Phipps MD

## 2021-09-16 NOTE — PROGRESS NOTES
Assessment complete. VSS stable. Remains intubated and sedated. NSR per monitor. Lung sounds clear. ETT size #8, in place at 25 cm to the lower lip. AC/VC. Rate 14. . PEEP 5. FiO2 30%. OGT to low intermittent suction in place at 57 cm to the lower lip for illeus. Abdomen distended and taut. Right IJ infusing Levophed, fentanyl, precedex, and vasopressin. See MAR for gtt details. Pedal and radial pulses palpated. Edematous +2/+3 pitting BUE and BLE. Scrotal and scleral edema noted as well. Moderate amount of blood noted on bed pad. Repositioned for comfort/skin breakdown prevention. Bilateral wrist restraints remain in place for patient safety.

## 2021-09-16 NOTE — PROGRESS NOTES
09/15/21 1936   Vent Patient Data   Plateau Pressure 23 [id number]   Static Compliance 53 mL/cmH2O   Dynamic Compliance 46 mL/cmH2O

## 2021-09-16 NOTE — PROGRESS NOTES
[facility] GI  Gastroenterology Progress Note    Mera Green is a 55 y.o. male patient. 1. Hepatic encephalopathy ([address]    2. Gastrointestinal hemorrhage, unspecified gastrointestinal hemorrhage type    3. Thickening of wall of gallbladder    4. Liver failure with hepatic coma, unspecified chronicity (HCC)        SUBJECTIVE:  Intubated. No BM but did pass red blood from rectum/hemorrhoids. ROS:  Cardiovascular ROS: intubated  Gastrointestinal ROS: intubated  Respiratory ROS: intubated    Physical    VITALS:  /79   Pulse 84   Temp 97.3 °F (36.3 °C) (Temporal)   Resp 12   Ht 5' 5\" (1.651 m)   Wt 186 lb 4.6 oz (84.5 kg)   SpO2 99%   BMI 31.00 kg/m²   TEMPERATURE:  Current - Temp: 97.3 °F (36.3 °C); Max - Temp  Av.8 °F (36.6 °C)  Min: 97.2 °F (36.2 °C)  Max: 99.6 °F (37.6 °C)    NAD  Regular rate  Lungs CTA Bilaterally  Abdomen soft, distended. Sedated  jaundice      Data    CBC:   Lab Results   Component Value Date    WBC 19.8 2021    RBC 2.32 2021    HGB 7.6 2021    HCT 21.8 2021    MCV 94.0 2021    MCH 32.7 2021    MCHC 34.8 2021    RDW 18.4 2021    PLT 50 2021    MPV 9.2 2021     Hepatic Function Panel:    Lab Results   Component Value Date    ALKPHOS 182 2021     2021     2021    PROT 4.5 2021    BILITOT 31.1 2021       CT abd/pelvis wo contrast 9/15/21  Impression   1. Diffuse gastrointestinal distension, likely due to ileus   2. Fluid throughout the colon and rectum.  Correlate with any history of   diarrhea.  No evident colonic wall thickening to indicate colitis   3. Cirrhosis with small ascites   4. Stones versus sludge in the gallbladder   5. Atelectasis or pneumonia in the lung bases with small left pleural effusion             ASSESSMENT / PLAN :    Alcoholic hepatitis, cirrhosis -.  He was initially unnamed but later found out that he came to [facility] 2 weeks go as [place] Chavez  64 (not his real name and ; he in undocumented). He was discharged 2021 with severe alcoholic hepatitis with TB 28. He was discharged on prednisolone. In the ER, he was found to be hypotensive and required intubation  Bilirubin 27, , , INR 4, Ammonia 136 at admission. All concerning for liver failure. Undetectable acetaminophen, salicylate, and alcohol levels. viral hepatitis panel normal. 9/15/21 bili slightly increased, transaminases improved vs admit. Bili 31 today. Hepatic encephalopathy -ammonia 136 at admit and is 76 today. On lactulose and xifaxan via ng. Ascites -moderate per CT.  21 paracentesis saag >1.1 c/w portal htn and neg for neutrocytic ascities. On Rocephin empirically. 9/15 ct only small ascities. GI bleed - blood per rectum at admit.  egd small nonbleeding varices and nonbleeding portal htn gastropathy, rec ppi and later hpylori stool and later b blocker.  colonoscopy with hemorrhoid source blood, 2 benign small polyps not removed. surg eval hemorrhoid, tx deferred. hbg stable. Anemia  - unclear what baseline hemoglobin is.  hbg stable. Leukocytosis -WBC 43 at admit. c diff negative. On antibiotics. Improved. Hypotension - not from gi bleed, more likely infectious. Remains on pressor but lower dose. CHAO: per renal. Creat improved. Anahi  Hyponatremia: resolved  Hypokalemia:  improved  Elevated inr:  stabalized around 2.9. ID: neg neutrocytic ascities and culture negative. On antbx. Per primary team.   Ileus - per CT. NG tube in place.  Rectal tube considered but held off d/t hemorrhoidal bleeding this am.      Recommendations:   - NG tube decompression  - f/u ascities cytology  - continue Lactulose every 4 hours, Xifaxan 550 mg twice daily  - daily inr, lft, wbc, lft, creat  -PPI IV twice daily  - etoh cessation key going forward  - hold off prednisolone with infectious issues  - future stool hpylori   - future possible nadolol prophylaxis     Discussed with Dr. Reba Beavers, JOELLE  [facility]    I have personally performed a face to face diagnostic evaluation on this patient. I have interviewed and examined the patient and I agree with the findings and recommended plan of care. In summary, my findings and plan are the following: remains intubated/on pressors, abd distended, ct 9/15 shows abd distension from ileus (likely from critical illness) rather than from ascities. Creat improved today, hbg stable, transaminses cont improve although bili elevated likely from hypotension/sepsis on top of critical liver disease. Agree ng to low intermittent suction for ileus, hold off rectal tube for ileus with hemorrhoid bleeding, wean pressors as able, antbx and antifungals per icu team, will decrease lactulose with ileus, cont xifaxan, no need for tips with small ascities. I discussed with icu team, transplant eval at [facility] submitted although less likely candidate (etoh until past month, infectious issues, etc).        Nelson Leong MD  [address] [facility]

## 2021-09-17 NOTE — PROGRESS NOTES
Received a call from pt transfer center saying that pt is not going to be accepted as a pt at [facility]. Will pass on tomorrow to dayshift, and asked transfer center to call back tomorrow as well to speak to Dr. Amanda Ocasio.

## 2021-09-17 NOTE — PROGRESS NOTES
Hospitalist Progress Note      PCP: No primary care provider on file. Date of Admission: 9/9/2021    Chief Complaint:     Altered Mental Status       pt brought to ed by [facility] after being found by family sitting in chair unresponsive but jill bs for squad was 79 1 amp given in route patient moaning at triage           Hospital Course:      No family at bedside to provide collateral history. History obtained from discussion with ER provider  Apparently file still being managed and has presented by different names in the past     The patient is a unknown y.o. adult with history of cirrhosis who apparently presented with family having been found by family poorly responsive who initiated CPR. EMS was called and on arrival to the ER patient was intubated. Post intubation he developed hypotension requiring fluid resuscitation with some improvement. Laboratory work-up was noted for ammonia of 136, lipase of 166, troponin of 0.10, sodium 122, potassium 2.9, CO2 of 18, creatinine of 2.0 and transaminitis with ,   CT chest abdomen pelvis revealed cirrhosis with portal hypertension and diffuse gallbladder thickening concerning for acute versus chronic cholecystitis with gallbladder sludge  CT brain with no acute intracranial pathology  EKG sinus rhythm  Urinalysis negative  He is notably jaundiced    Subjective: Intubated and sedated, hypokalemia, still on Levophed to 6 mics, FiO2 30%, ammonia 60  3, sodium 151, hypokalemia 2.6, patient had been declined for transfer to , no bed availability.       Medications:  Reviewed    Infusion Medications    dexmedetomidine 1 mcg/kg/hr (09/17/21 1129)    vasopressin (Septic Shock) infusion Stopped (09/16/21 1100)    sodium chloride      sodium chloride 10 mL/hr at 09/15/21 2201    norepinephrine 2 mcg/min (09/17/21 0649)    fentaNYL 75 mcg/hr (09/17/21 1035)     Scheduled Medications    polyethylene glycol  17 g Oral Daily    sennosides-docusate sodium  2 tablet Oral BID    voriconazole  2 mg/kg IntraVENous Q12H    lactulose  20 g Per NG tube Q8H    pantoprazole  40 mg IntraVENous BID    levofloxacin  750 mg IntraVENous Q48H    sodium chloride flush  5-40 mL IntraVENous 2 times per day    ipratropium-albuterol  1 ampule Inhalation 4x daily    rifaximin  550 mg Per NG tube BID    Empty Capsule #00 Purple/White  1 each Does not apply Prior to discharge     PRN Meds: sodium chloride, sodium chloride flush, sodium chloride, ondansetron **OR** ondansetron, acetaminophen **OR** acetaminophen, potassium chloride      Intake/Output Summary (Last 24 hours) at 9/17/2021 1513  Last data filed at 9/17/2021 0500  Gross per 24 hour   Intake 1284.26 ml   Output 3350 ml   Net -2065.74 ml       Physical Exam Performed:    BP 99/65   Pulse 91   Temp 97.1 °F (36.2 °C) (Temporal)   Resp 15   Ht 5' 5\" (1.651 m)   Wt 177 lb 11.1 oz (80.6 kg)   SpO2 100%   BMI 29.57 kg/m²     General appearance: visibly jaundiced  HEENT: Pupils equal, round, and reactive to light. Has scleral icterus. Neck: Supple, with full range of motion. No jugular venous distention. Trachea midline. Respiratory:  Vented. . Clear to auscultation, bilaterally without Rales/Wheezes/Rhonchi. Cardiovascular: Regular rate and rhythm with normal S1/S2 without murmurs, rubs or gallops. Abdomen: Soft,  mildlyy distended. BS +   Musculoskeletal: No clubbing, cyanosis or edema bilaterally. Full range of motion without deformity. Skin: Skin color, texture, turgor normal.  No rashes or lesions.   Neurologic: Intubated and sedated  Psychiatric: sedated  Capillary Refill: Brisk,3 seconds, normal   Peripheral Pulses: +2 palpable, equal bilaterally       Labs:   Recent Labs     09/15/21  0524 09/15/21  1435 09/16/21  0550 09/16/21  0550 09/16/21  1530 09/16/21  2315 09/17/21  0450   WBC 20.0*  --  19.8*  --   --   --  14.2*   HGB 8.1*   < > 7.6*   < > 7.8* 7.6* 7.4*   HCT 23.3*   < > 21.8*   < > 22.3* 21.7* 21.3*   PLT 55*  --  50*  --   --   --  38*    < > = values in this interval not displayed. Recent Labs     09/15/21  0524 09/15/21  0524 09/16/21  0550 09/17/21  0450 09/17/21  1300     --  144 151*  --    K 3.8   < > 3.3* 2.6* 2.8*     --  108 117*  --    CO2 19*  --  20* 20*  --    BUN 67*  --  75* 72*  --    CREATININE 3.4*  --  2.7* 2.3*  --    CALCIUM 9.0  --  9.7 9.5  --    PHOS 4.0  --  4.1 3.4  --     < > = values in this interval not displayed. Recent Labs     09/15/21  0524 09/16/21  0550 09/17/21  0450   * 161* 104*   * 114* 91*   BILITOT 30.5* 31.1* 26.2*   ALKPHOS 184* 182* 162*     Recent Labs     09/16/21  1530 09/17/21  0745   INR 2.88* 3.03*     Recent Labs     09/15/21  0524 09/16/21  0550 09/17/21  0450   CKTOTAL 89 51 45       Urinalysis:      Lab Results   Component Value Date    NITRU Negative 09/09/2021    WBCUA 0-2 09/09/2021    BACTERIA 1+ 09/09/2021    RBCUA 3-4 09/09/2021    BLOODU TRACE-LYSED 09/09/2021    SPECGRAV 1.020 09/09/2021    GLUCOSEU 100 09/09/2021       Radiology:  XR CHEST PORTABLE   Final Result   Slightly improved aeration in the lower lung zones bilaterally. CT ABDOMEN PELVIS WO CONTRAST Additional Contrast? None   Final Result   1. Diffuse gastrointestinal distension, likely due to ileus   2. Fluid throughout the colon and rectum. Correlate with any history of   diarrhea. No evident colonic wall thickening to indicate colitis   3. Cirrhosis with small ascites   4. Stones versus sludge in the gallbladder   5. Atelectasis or pneumonia in the lung bases with small left pleural effusion         XR CHEST PORTABLE   Final Result   Low lung volumes with bibasilar effusions. Support tubes as described above.          XR CHEST PORTABLE   Final Result   Bibasilar hypoaeration again noted with persistent left basilar opacity   suggesting atelectasis         IR US GUIDED PARACENTESIS   Final Result   Successful ultrasound guided paracentesis. US GALLBLADDER RUQ   Final Result   Gallbladder wall is thickened. This is a nonspecific finding in the presence   of ascites as it may simply be reactive, rather than secondary to underlying   cholecystitis      Low level echoes are seen within the gallbladder, either stones or sludge. Cirrhosis with moderate ascites         XR CHEST PORTABLE   Final Result   New central venous catheter as above, approach is unclear. CT ABDOMEN PELVIS WO CONTRAST Additional Contrast? None   Final Result   1. Hepatic cirrhosis with associated portal venous hypertension and scattered   moderate intraperitoneal free fluid. 2. Diffuse gallbladder wall thickening suggesting association with chronic   versus acute cholecystitis. 3. Suspected gallbladder lumen dependent radiodense sludge. 4. Mild cardiomegaly. CT CHEST WO CONTRAST   Final Result   1. Hepatic cirrhosis with associated portal venous hypertension and scattered   moderate intraperitoneal free fluid. 2. Diffuse gallbladder wall thickening suggesting association with chronic   versus acute cholecystitis. 3. Suspected gallbladder lumen dependent radiodense sludge. 4. Mild cardiomegaly. CT Head WO Contrast   Final Result   No acute intracranial abnormality. XR CHEST PORTABLE   Final Result   Endotracheal tube and orogastric tube are acceptable. Bibasilar opacities are again noted. XR CHEST PORTABLE   Final Result   Reticulation at the left base more than right is nonspecific. Could relate   to subsegmental atelectasis, aspiration pneumonitis, or early pneumonia.                  Assessment/Plan:    Active Hospital Problems    Diagnosis     Hepatic encephalopathy ([address] [K72.90]     Hyperammonemia ([address] [E72.20]     Elevated lactic acid level [G31.06]     Metabolic acidosis, increased anion gap (IAG) [E87.2]     Elevated troponin [R77.8]     Hypokalemia [E87.6]     Hyponatremia [E87.1]     Hypotension [I95.9]     Anemia [D64.9]     GI bleed [K92.2]     Transaminitis [R74.01]        Acute respiratory failure/pneumonia  Secondary to hepatic encephalopathy and ascites. He remains intubated and sedated, [facility] on board    Growing stenotrophomonas in sputum currently on Levaquin    Hepatic encephalopathy  Appears to be end-stage cirrhosis at this time or at minimum decompensated cirrhosis. Ammonia 72>86>76>60, on lactulose and rifaximin, GI on board. Elevated liver functions and jaundice  Appears to be related to alcoholic cirrhosis  Unclear if the patient has any hepatitis history. GI is following    Diffuse gastrointestinal distention likely due to ileus, on abdominal x-ray, NG tube, rectal tube per GI, can give lactulose rectal    Acute metabolic acidosis  Appears to be a combination of acute kidney injury possible sepsis lactic acidosis and decompensated cirrhosis. We are working to correct the patient's abnormalities  Nephrology is following as well, had albumin, no urgent RRT   Creatinine 3.4> trending down    Acute kidney injury  Secondary to all of the above, Cr 3.4>2.7>2.3  Nephrology is following as noted above  Continue to monitor renal function     Anemia with possible GI bleed  Patient has been started on octreotide  He is on PPI as well  Gastroenterology has been consulted for his cirrhosis as well as for the possible bleed. GI has determined that this is secondary to hemorrhoids General surgery was consulted it would be more dangerous to do a surgery for the hemorrhoid then it would be to just allow it to ooze a little bit.   Banding would also be more dangerous     Thrombocytopenia  Secondary to liver disease    DVT Prophylaxis: SCDs  Diet: Diet NPO  Code Status: Full Code    PT/OT Eval Status: When appropriate    Dispo -patient is critically ill medical decision making is high    35 minutes of critical care time spent    Chidi Doty MD

## 2021-09-17 NOTE — PROGRESS NOTES
Reassessment and VS completed. See flowsheet. Pt is awake and restless. Not following commands, however is tracking voice/turning towards voice. No eye contact. Pt is shaking head \"no\" when oral care performed.

## 2021-09-17 NOTE — PROGRESS NOTES
Office : [phone]     Fax :[phone]       Nephrology  Progress Note      Patient's Name: Kiran Joshua  11:29 AM  9/17/2021    Reason for Consult:  CHAO , Hyponatremia     Requesting Physician: Dr. Rae Reynoso. Chief Complaint:    Chief Complaint   Patient presents with    Altered Mental Status     pt brought to ed by [facility] after being found by family sitting in chair unresponsive but breahing, bs for squad was 79 1 amp given in route patient moaning at triage          History of Present iIlness:    Kiran Joshua is a gentleman with unknown age possible history of cirrhosis who apparently presented with family having been found by family poorly responsive who initiated CPR. EMS was called and on arrival to the ER patient was intubated. Post intubation he developed hypotension requiring fluid resuscitation with some improvement. Laboratory work-up was noted for ammonia of 136, lipase of 166, troponin of 0.10, sodium 122, potassium 2.9, CO2 of 18, creatinine of 2.0 and transaminitis with ,   CT chest abdomen pelvis revealed cirrhosis with portal hypertension and diffuse gallbladder thickening concerning for acute versus chronic cholecystitis with gallbladder sludge  CT brain with no acute intracranial pathology  EKG sinus rhythm  Urinalysis negative  History has been gathered from medical records      Latest sodium level is 119. Interval hx       Intubated on vent support   UOP  Improving         I/O last 3 completed shifts: In: 1284.3 [I.V.:685.1; IV Piggyback:599.2]  Out: 5569 [Urine:3750; Emesis/NG output:750]    No past medical history on file.     Past Surgical History:   Procedure Laterality Date    COLONOSCOPY N/A 9/13/2021    COLONOSCOPY DIAGNOSTIC performed by Rylan Jain MD at [facility] N/A 9/13/2021    EGD DIAGNOSTIC ONLY performed by Rylan Jain MD at [address] [place] [address]       No family history on file. Allergies:  Patient has no known allergies. Current Medications:    phytonadione (VITAMIN K) tablet 5 mg, Once  polyethylene glycol (GLYCOLAX) packet 17 g, Daily  sennosides-docusate sodium (SENOKOT-S) 8.6-50 MG tablet 2 tablet, BID  voriconazole (VFEND) 170 mg in dextrose 5 % 100 mL IVPB, Q12H  lactulose (CHRONULAC) 10 GM/15ML solution 20 g, Q8H  dexmedetomidine (PRECEDEX) 400 mcg in sodium chloride 0.9 % 100 mL infusion, Continuous  vasopressin 20 Units in dextrose 5 % 100 mL infusion, Continuous  pantoprazole (PROTONIX) injection 40 mg, BID  levoFLOXacin (LEVAQUIN) 750 MG/150ML infusion 750 mg, Q48H  0.9 % sodium chloride infusion, PRN  sodium chloride flush 0.9 % injection 5-40 mL, 2 times per day  sodium chloride flush 0.9 % injection 5-40 mL, PRN  0.9 % sodium chloride infusion, PRN  ondansetron (ZOFRAN-ODT) disintegrating tablet 4 mg, Q8H PRN   Or  ondansetron (ZOFRAN) injection 4 mg, Q6H PRN  acetaminophen (TYLENOL) tablet 650 mg, Q6H PRN   Or  acetaminophen (TYLENOL) suppository 650 mg, Q6H PRN  ipratropium-albuterol (DUONEB) nebulizer solution 1 ampule, 4x daily  potassium chloride 20 mEq/50 mL IVPB (Central Line), PRN  rifaximin (XIFAXAN) tablet 550 mg, BID  norepinephrine (LEVOPHED) 16 mg in dextrose 5% 250 mL infusion, Continuous  PATIENT HOME MEDS STORED IN PHARMACY!!!!, Prior to discharge  fentaNYL 10 mcg/mL infusion, Continuous        Physical exam:     Vitals:  /71   Pulse 79   Temp 97.6 °F (36.4 °C) (Temporal)   Resp 18   Ht 5' 5\" (1.651 m)   Wt 177 lb 11.1 oz (80.6 kg)   SpO2 100%   BMI 29.57 kg/m²   Constitutional:  Intubated   Skin: no rash, turgor wnl  Heent:   Icterus +  Neck: no bruits or jvd noted  Cardiovascular:  S1, S2 without m/r/g  Respiratory: CTA B without w/r/r  Abdomen:  +bs, soft, nt, nd  Ext: no  lower extremity edema  Psychiatric: mood and affect appropriate  Musculoskeletal:  Rom, muscular strength intact    Labs:  CBC:   Recent Labs     09/15/21  0524 09/15/21  1435 09/16/21  0550 09/16/21  0550 09/16/21  1530 09/16/21  2315 09/17/21  0450   WBC 20.0*  --  19.8*  --   --   --  14.2*   HGB 8.1*   < > 7.6*   < > 7.8* 7.6* 7.4*   PLT 55*  --  50*  --   --   --  38*    < > = values in this interval not displayed. BMP:    Recent Labs     09/15/21  0524 09/16/21  0550 09/17/21  0450    144 151*   K 3.8 3.3* 2.6*    108 117*   CO2 19* 20* 20*   BUN 67* 75* 72*   CREATININE 3.4* 2.7* 2.3*   GLUCOSE 128* 149* 107*     Ca/Mg/Phos:   Recent Labs     09/15/21  0524 09/16/21  0550 09/17/21  0450   CALCIUM 9.0 9.7 9.5   MG 2.20 2.30 2.00   PHOS 4.0 4.1 3.4     Hepatic:   Recent Labs     09/15/21  0524 09/16/21  0550 09/17/21  0450   * 161* 104*   * 114* 91*   BILITOT 30.5* 31.1* 26.2*   ALKPHOS 184* 182* 162*     Troponin:   No results for input(s): TROPONINI in the last 72 hours. BNP: No results for input(s): BNP in the last 72 hours. Lipids: No results for input(s): CHOL, TRIG, HDL, LDLCALC, LABVLDL in the last 72 hours. ABGs:   Recent Labs     09/17/21  0450   PHART 7.376   PO2ART 113.0*   HGO7CKT 35.3     INR:   Recent Labs     09/16/21  1530 09/17/21  0745   INR 2.88* 3.03*       IMAGING:  XR CHEST PORTABLE   Final Result   Slightly improved aeration in the lower lung zones bilaterally. CT ABDOMEN PELVIS WO CONTRAST Additional Contrast? None   Final Result   1. Diffuse gastrointestinal distension, likely due to ileus   2. Fluid throughout the colon and rectum. Correlate with any history of   diarrhea. No evident colonic wall thickening to indicate colitis   3. Cirrhosis with small ascites   4. Stones versus sludge in the gallbladder   5.  Atelectasis or pneumonia in the lung bases with small left pleural effusion         XR CHEST PORTABLE   Final Result   Low lung volumes with bibasilar effusions. Support tubes as described above. XR CHEST PORTABLE   Final Result   Bibasilar hypoaeration again noted with persistent left basilar opacity   suggesting atelectasis         IR US GUIDED PARACENTESIS   Final Result   Successful ultrasound guided paracentesis. US GALLBLADDER RUQ   Final Result   Gallbladder wall is thickened. This is a nonspecific finding in the presence   of ascites as it may simply be reactive, rather than secondary to underlying   cholecystitis      Low level echoes are seen within the gallbladder, either stones or sludge. Cirrhosis with moderate ascites         XR CHEST PORTABLE   Final Result   New central venous catheter as above, approach is unclear. CT ABDOMEN PELVIS WO CONTRAST Additional Contrast? None   Final Result   1. Hepatic cirrhosis with associated portal venous hypertension and scattered   moderate intraperitoneal free fluid. 2. Diffuse gallbladder wall thickening suggesting association with chronic   versus acute cholecystitis. 3. Suspected gallbladder lumen dependent radiodense sludge. 4. Mild cardiomegaly. CT CHEST WO CONTRAST   Final Result   1. Hepatic cirrhosis with associated portal venous hypertension and scattered   moderate intraperitoneal free fluid. 2. Diffuse gallbladder wall thickening suggesting association with chronic   versus acute cholecystitis. 3. Suspected gallbladder lumen dependent radiodense sludge. 4. Mild cardiomegaly. CT Head WO Contrast   Final Result   No acute intracranial abnormality. XR CHEST PORTABLE   Final Result   Endotracheal tube and orogastric tube are acceptable. Bibasilar opacities are again noted. XR CHEST PORTABLE   Final Result   Reticulation at the left base more than right is nonspecific.   Could relate   to subsegmental atelectasis, aspiration pneumonitis, or early pneumonia. Assessment/Plan :      1. CHAO 2/2 ATN from hypotension / shock. Anemia   Renal function improving slowly   UOP improved   Recommend to dose adjust all medications  based on renal functions  Maintain SBP> 90 mmHg   Daily weights   AVOID NSAIDs  Avoid Nephrotoxins  Monitor Intake/Output  Call if significant decrease in urine output     2. Hyponatremia. Improved. Continue to monitor. 3.  Hypotension/ shock state   On levophed   Give albumin as needed     4. High anion gap acidosis. Resolved. 5. Hypokalemia - replace iv.     6. Hepatic failure/Encephalopathy   GI following. 7. Anemia.  S/p PRBC   Hb today 7.4      Recommend to dose adjust all medications  based on renal functions  Maintain SBP> 90 mmHg   Daily weights   AVOID NSAIDs  Avoid Nephrotoxins  Monitor Intake/Output  Call if significant decrease in urine output               Thank you for allowing us to participate in care of Prachi Blake         Electronically signed by: Christelle Andrea MD, 9/17/2021, 11:29 AM      [facility] [address]  Office : [phone]  Fax :[phone]

## 2021-09-17 NOTE — PROGRESS NOTES
Assessment and VS complete. See flowsheet. Pt sedated on ventilator, but does open eyes at times. Is not following commands, however. ETT #8, 25cm at lip level. AC 14, , FiO2 30%, PEEP 5. Pt is jaundiced and swollen. Abdomen large and distended. OG to ILWS, with large amount of yellow output. Cannister changed. Pulled up and repositioned for comfort, using pillows for support. Call light in reach with soft music playing for pleasure.

## 2021-09-17 NOTE — PLAN OF CARE
Problem: Non-Violent Restraints  Goal: Removal from restraints as soon as assessed to be safe  Outcome: Ongoing  Goal: No harm/injury to patient while restraints in use  Outcome: Ongoing  Goal: Patient's dignity will be maintained  Outcome: Ongoing     Problem: Nutrition  Goal: Optimal nutrition therapy  Outcome: Ongoing     Problem: Skin Integrity:  Goal: Will show no infection signs and symptoms  Description: Will show no infection signs and symptoms  Outcome: Ongoing  Goal: Absence of new skin breakdown  Description: Absence of new skin breakdown  Outcome: Ongoing     Problem: Falls - Risk of:  Goal: Will remain free from falls  Description: Will remain free from falls  Outcome: Ongoing  Goal: Absence of physical injury  Description: Absence of physical injury  Outcome: Ongoing     Problem: Infection:  Goal: Will remain free from infection  Description: Will remain free from infection  Outcome: Ongoing     Problem: Safety:  Goal: Free from accidental physical injury  Description: Free from accidental physical injury  Outcome: Ongoing  Goal: Free from intentional harm  Description: Free from intentional harm  Outcome: Ongoing     Problem: Daily Care:  Goal: Daily care needs are met  Description: Daily care needs are met  Outcome: Ongoing     Problem: Pain:  Description: Pain management should include both nonpharmacologic and pharmacologic interventions.   Goal: Patient's pain/discomfort is manageable  Description: Patient's pain/discomfort is manageable  Outcome: Ongoing  Goal: Pain level will decrease  Description: Pain level will decrease  Outcome: Ongoing  Goal: Control of acute pain  Description: Control of acute pain  Outcome: Ongoing  Goal: Control of chronic pain  Description: Control of chronic pain  Outcome: Ongoing     Problem: Skin Integrity:  Goal: Skin integrity will stabilize  Description: Skin integrity will stabilize  Outcome: Ongoing     Problem: Discharge Planning:  Goal: Patients continuum of

## 2021-09-17 NOTE — PROGRESS NOTES
[facility] Pulmonary/CCM Progress note      Admit Date: 9/9/2021    Chief Complaint: Altered mental status    Subjective: Interval History: Patient continues to remain critically ill. On full mechanical ventilatory support. Hemodynamics slowly stabilizing. Still on low-dose vasopressor support. Has developed ileus which is slowly resolving. No further GI bleed seen. Respiratory culture growing stenotrophomonas as well as mold. Poor renal function. Remains coagulopathic and seen to have rectal bleeding. Scheduled Meds:   polyethylene glycol  17 g Oral Daily    sennosides-docusate sodium  2 tablet Oral BID    voriconazole  2 mg/kg IntraVENous Q12H    lactulose  20 g Per NG tube Q8H    pantoprazole  40 mg IntraVENous BID    levofloxacin  750 mg IntraVENous Q48H    sodium chloride flush  5-40 mL IntraVENous 2 times per day    ipratropium-albuterol  1 ampule Inhalation 4x daily    rifaximin  550 mg Per NG tube BID    Empty Capsule #00 Purple/White  1 each Does not apply Prior to discharge     Continuous Infusions:   dexmedetomidine 1 mcg/kg/hr (09/17/21 1129)    vasopressin (Septic Shock) infusion Stopped (09/16/21 1100)    sodium chloride      sodium chloride 10 mL/hr at 09/15/21 2201    norepinephrine 2 mcg/min (09/17/21 0649)    fentaNYL 75 mcg/hr (09/17/21 1035)     PRN Meds:sodium chloride, sodium chloride flush, sodium chloride, ondansetron **OR** ondansetron, acetaminophen **OR** acetaminophen, potassium chloride    Review of Systems  Unable to obtain since patient is currently intubated and sedated    Objective:     I/O last 3 completed shifts: In: 1284.3 [I.V.:685.1; IV Piggyback:599.2]  Out: 6104 [Urine:3750; Emesis/NG output:750]  No intake/output data recorded.     General Appearance: Intubated and sedated, in no acute distress  Skin: warm and dry, no rash or erythema generalized icterus seen  Head: normocephalic and atraumatic  Eyes: pupils equal, round, and reactive to light, extraocular eye movements intact, conjunctivae normal  ENT: external ear and ear canal normal bilaterally, nose without deformity, nasal mucosa and turbinates normal  Neck: supple and non-tender without mass, no cervical lymphadenopathy  Pulmonary/Chest: clear to auscultation bilaterally- no wheezes, rales or rhonchi, normal air movement, no respiratory distress  Cardiovascular: normal rate, regular rhythm,  no murmurs, rubs, distal pulses intact, no carotid bruits  Abdomen: soft, non-tender, non-distended, normal bowel sounds, no masses or organomegaly  Lymph Nodes: Cervical, supraclavicular normal  Extremities: no cyanosis, clubbing or edema  Musculoskeletal: normal range of motion, no joint swelling, deformity or tenderness  Neurologic: Sedated, no focal neurologic deficits    Data Review:  LABS:  Recent Labs     09/15/21  0524 09/15/21  1435 09/16/21  0550 09/16/21  0550 09/16/21  1530 09/16/21  2315 09/17/21  0450 09/17/21  0745 09/17/21  1300   WBC 20.0*  --  19.8*  --   --   --  14.2*  --   --    HGB 8.1*   < > 7.6*   < > 7.8* 7.6* 7.4*  --   --    HCT 23.3*   < > 21.8*   < > 22.3* 21.7* 21.3*  --   --    PLT 55*  --  50*  --   --   --  38*  --   --    *  --  114*  --   --   --  91*  --   --    *  --  161*  --   --   --  104*  --   --      --  144  --   --   --  151*  --   --    K 3.8  --  3.3*  --   --   --  2.6*  --  2.8*     --  108  --   --   --  117*  --   --    CREATININE 3.4*  --  2.7*  --   --   --  2.3*  --   --    BUN 67*  --  75*  --   --   --  72*  --   --    CO2 19*  --  20*  --   --   --  20*  --   --    INR  --   --   --   --  2.88*  --   --  3.03*  --     < > = values in this interval not displayed.        Recent Labs     09/15/21  0524 09/15/21  0524 09/16/21  0550 09/17/21  0450 09/17/21  1300   GLUCOSE 128*  --  149* 107*  --    CALCIUM 9.0  --  9.7 9.5  --      --  144 151*  --    K 3.8   < > 3.3* 2.6* 2.8*   CO2 19*  --  20* 20*  --      --  108 117* --    BUN 67*  --  75* 72*  --    CREATININE 3.4*  --  2.7* 2.3*  --     < > = values in this interval not displayed. Recent Labs     09/15/21  0539 09/16/21  0550 09/17/21  0450   PHART 7.341* 7.345* 7.376   MYJ6YUR 35.9 37.6 35.3   PO2ART 159.0* 119.0* 113.0*   AVE4WKV 19.4* 20.5* 20.7*   G3MOVBEB 99.9 99.7 99.8   BEART -5.8* -4.7* -4.1*       Lab Results   Component Value Date    INR 3.03 (H) 09/17/2021    INR 2.88 (H) 09/16/2021    INR 2.94 (H) 09/14/2021    PROTIME 35.9 (H) 09/17/2021    PROTIME 34.0 (H) 09/16/2021    PROTIME 34.8 (H) 09/14/2021     No results found for: AMYLASE   No results found for: LABA1C  No results found for: EAG  No results found for: TSH, I3GTPYW, U2UJXMI, THYROIDAB, FT3, T4FREE  Lab Results   Component Value Date    CKTOTAL 45 09/17/2021    TROPONINI 0.09 (H) 09/10/2021      No results found for: CRP   No results found for: BNP   No results found for: DDIMER   No results found for: FERRITIN   Lab Results   Component Value Date    LACTA 1.3 09/17/2021       Radiology: All pertinent images / reports were reviewed as a part of this visit.     Narrative   EXAMINATION:   ONE XRAY VIEW OF THE CHEST       9/16/2021 7:20 am       FINDINGS:   Supportive devices are stable.  The heart is enlarged.  Mild vascular   congestion stable centrally.  Persistent small left pleural effusion with   improving effusion on the right.  Minimal bibasilar ground-glass opacities   appear slightly improved.  No skeletal finding.           Impression   Slightly improved aeration in the lower lung zones bilaterally.               Problem List:     Hepatic encephalopathy, worsening  Acute versus chronic cholecystitis  Alcoholic cirrhosis with hepatitis  Hyperammonemia, persistent  Hypovolemic versus septic shock  Ileus  Anemia  Nonbleeding esophageal varices  Hemorrhoids, actively bleeding  Hyponatremia/CHAO  Stenotrophomonas infection  Respiratory fungal infection    Assessment/Plan:     Patient is currently intubated for airway protection. ABG showing stable gas exchange. Currently on full mechanical ventilatory support. We will provide him with a spontaneous breathing trial to assess his readiness for further ventilator wean. Hepatic encephalopathy/hyperammonemia. Ammonia levels are slowly normalizing. Patient continues on lactulose and rifaximin as per GI recommendations. Has developed ileus. On NG tube decompression. Unable to place a rectal tube as patient has bleeding hemorrhoids and coagulopathy. Also has developed septic shock most likely due to stenotrophomonas and fungal infection in the lungs. Hemodynamics are slowly stabilizing. Continues on low-dose Levophed. Vasopressin is off. Titrate to a systolic blood pressure of 90 mmHg and above. Now isolating stenotrophomonas and mold from respiratory culture. Currently on Levaquin and voriconazole. Underwent EGD earlier this admission with nonbleeding esophageal varices seen. Hemorrhoidal bleeding seen. General surgery consulted. No surgical interventions suggested. Received 2 units of PRBC and 6 units of FFP so far. INR 2.85. Receiving daily vitamin K 10 mg IV. Hemoglobin again trending down. Will transfuse if hemoglobin is below 7 g/dL. Underwent ultrasound-guided paracentesis earlier this admission. Hyponatremia/CHAO: Hepatorenal syndrome. Slowly stabilizing. Urine output picking up. Patient received albumin infusions for potential HRS. Potassium adequately corrected. Nephrology on board. Will follow the recommendation. Tube feeds on hold due to ileus. On IV Protonix twice a day. Patient has end-stage liver disease with recurrent ascites, severe portal hypertension. We have reached out to [facility] liver transplant center, but as of now they do not have any availability to accept the patient for evaluation of liver transplant.   I have discussed the case with both the gastroenterologist and the hospitalist in detail. Total critical care time caring for this patient with life threatening illness, including direct patient contact, management of life support systems, review of data including imaging and labs, discussions with other team members and physicians is at least 35 minutes so far today, excluding procedures.         Avelina Minaya MD   Pulmonary Critical Care and Sleep Medicine  [address] [place] [address],[address]   [address]  9/9/2021, 3:00 PM

## 2021-09-17 NOTE — PROGRESS NOTES
[facility] GI  Gastroenterology Progress Note    Tiffanie Mccarthy is a 55 y.o. male patient. 1. Hepatic encephalopathy ([address]    2. Gastrointestinal hemorrhage, unspecified gastrointestinal hemorrhage type    3. Thickening of wall of gallbladder    4. Liver failure with hepatic coma, unspecified chronicity (HCC)        SUBJECTIVE:  Intubated. ROS:  Cardiovascular ROS: intubated  Gastrointestinal ROS: intubated  Respiratory ROS: intubated    Physical    VITALS:  /71   Pulse 79   Temp 97.6 °F (36.4 °C) (Temporal)   Resp 18   Ht 5' 5\" (1.651 m)   Wt 177 lb 11.1 oz (80.6 kg)   SpO2 100%   BMI 29.57 kg/m²   TEMPERATURE:  Current - Temp: 97.6 °F (36.4 °C); Max - Temp  Av.6 °F (36.4 °C)  Min: 96.9 °F (36.1 °C)  Max: 98.5 °F (36.9 °C)    NAD  Regular rate  Lungs CTA Bilaterally  Abdomen soft, distended. Sedated  jaundice      Data    CBC:   Lab Results   Component Value Date    WBC 14.2 2021    RBC 2.28 2021    HGB 7.4 2021    HCT 21.3 2021    MCV 93.4 2021    MCH 32.6 2021    MCHC 34.9 2021    RDW 18.1 2021    PLT 38 2021    MPV 9.0 2021     Hepatic Function Panel:    Lab Results   Component Value Date    ALKPHOS 162 2021    ALT 91 2021     2021    PROT 4.0 2021    BILITOT 26.2 2021       CT abd/pelvis wo contrast 9/15/21  Impression   1. Diffuse gastrointestinal distension, likely due to ileus   2. Fluid throughout the colon and rectum.  Correlate with any history of   diarrhea.  No evident colonic wall thickening to indicate colitis   3. Cirrhosis with small ascites   4. Stones versus sludge in the gallbladder   5. Atelectasis or pneumonia in the lung bases with small left pleural effusion             ASSESSMENT / PLAN :    Alcoholic hepatitis, cirrhosis -.  He was initially unnamed but later found out that he came to [facility] 2 weeks go as Arturo PANDEY 64 (not his real name and ; he in with Dr. Kalee Block PA-C  [facility]     I have personally performed a face to face diagnostic evaluation on this patient. I have interviewed and examined the patient and I agree with the findings and recommended plan of care. In summary, my findings and plan are the following: remains intubated, abd less distended today, labs promising today as wbc improved, creat improved, hbg stable, inr minimal change, lft/bili improved, only low dose pressor, weaning sedation. Cont ng to low intermitttent suction, cont atnbx and antifungal per priamrty team for stenotrophomonas and fungal infection in the lungs, cont xifaxna and lower dose lacutlose, hold off rectal tube and rectal lacutlose due to hemorrhoid that bled earlier this admit/surg deferred intervention, received vit k,  cont follow labs which again are improved today.        Joshua Linton MD  [address]

## 2021-09-18 NOTE — PROGRESS NOTES
Gastroenterology Progress Note            Bisi Zamorano is a 55 y.o. male patient. 1. Hepatic encephalopathy ([address]    2. Gastrointestinal hemorrhage, unspecified gastrointestinal hemorrhage type    3. Thickening of wall of gallbladder    4. Liver failure with hepatic coma, unspecified chronicity (HCC)        SUBJECTIVE:  Pt intubated, sedated, critically ill. Physical    VITALS:  /65   Pulse 66   Temp 97.4 °F (36.3 °C) (Temporal)   Resp 11   Ht 5' 5\" (1.651 m)   Wt 175 lb 11.3 oz (79.7 kg)   SpO2 100%   BMI 29.24 kg/m²   TEMPERATURE:  Current - Temp: 97.4 °F (36.3 °C); Max - Temp  Av.2 °F (37.3 °C)  Min: 97.1 °F (36.2 °C)  Max: 103.6 °F (39.8 °C)    Abdomen soft, ND , no HSM, Bowel sounds normal   Intubated sedated. Trace edema. Data      Recent Labs     21  0550 21  1530 21  0450 210 21  0438   WBC 19.8*  --  14.2*  --  14.1*   HGB 7.6*   < > 7.4* 7.6* 7.3*   HCT 21.8*   < > 21.3* 22.1* 21.3*   MCV 94.0  --  93.4  --  94.2   PLT 50*  --  38*  --  38*    < > = values in this interval not displayed. Recent Labs     21  0550 21  0550 21  0450 21  1300 21  21421  0438     --  151*  --   --  152*   K 3.3*   < > 2.6* 2.8* 3.1* 3.5     --  117*  --   --  121*   CO2 20*  --  20*  --   --  20*   PHOS 4.1  --  3.4  --   --  2.8   BUN 75*  --  72*  --   --  70*   CREATININE 2.7*  --  2.3*  --   --  1.7*    < > = values in this interval not displayed. Recent Labs     21  0550 21  0450 21  0438   * 104* 157*   * 91* 97*   BILITOT 31.1* 26.2* 25.5*   ALKPHOS 182* 162* 164*     No results for input(s): LIPASE, AMYLASE in the last 72 hours. ASSESSMENT   1. Alcoholic cirrhosis-  [facility] declined to eval for transplant. 2. Hepatic encephalopathy- ammonia improved. Pt on xifaxan, lactulose  3. Ascites-  SAAG c/w portal hypertension.   Neg for sbp or neutrocytic ascites. 4. Leukocytosis- improving. 5. CHAO - cr continues to improve  6. Hypernatremia-  7. Hematochezia from hemorrhoid  8. Anemia-  hgb stable  7's.    9. Ileus. Ab some distention  BS + . 10. Respiratory failure. -  On vent. Has fungal infection in lungs also. PLAN    1. Cont Abx and antifungals  2. Vent and pressor support  3. Follow hgb  4. Cont xifaxan. Minimize lactulose due to its side effect of abd distention.        Juanjo Rogers MD  [address]

## 2021-09-18 NOTE — PROGRESS NOTES
Office : [phone]     Fax :[phone]       Nephrology  Progress Note      Patient's Name: Marlin Hines  12:19 PM  9/18/2021    Reason for Consult:  CHAO , Hyponatremia     Requesting Physician: Dr. Deanne Mcclure. Chief Complaint:    Chief Complaint   Patient presents with    Altered Mental Status     pt brought to ed by [place] [facility] after being found by family sitting in chair unresponsive but breahing, bs for squad was 79 1 amp given in route patient moaning at triage          History of Present iIlness:    Marlin Hines is a gentleman with unknown age possible history of cirrhosis who apparently presented with family having been found by family poorly responsive who initiated CPR. EMS was called and on arrival to the ER patient was intubated. Post intubation he developed hypotension requiring fluid resuscitation with some improvement. Laboratory work-up was noted for ammonia of 136, lipase of 166, troponin of 0.10, sodium 122, potassium 2.9, CO2 of 18, creatinine of 2.0 and transaminitis with ,   CT chest abdomen pelvis revealed cirrhosis with portal hypertension and diffuse gallbladder thickening concerning for acute versus chronic cholecystitis with gallbladder sludge  CT brain with no acute intracranial pathology  EKG sinus rhythm  Urinalysis negative  History has been gathered from medical records      Latest sodium level is 119. Interval hx       Intubated on vent support   UOP  Improving         I/O last 3 completed shifts: In: 1875.1 [I.V.:1107.5; NG/GT:100; IV Piggyback:667.6]  Out: 7191 [Urine:1800; Emesis/NG output:600; Stool:75]    No past medical history on file.     Past Surgical History:   Procedure Laterality Date    COLONOSCOPY N/A 9/13/2021    COLONOSCOPY DIAGNOSTIC performed by Sheeba Vora MD at [facility] N/A 9/13/2021    EGD DIAGNOSTIC ONLY performed by Sheeba Vora MD at [address] [place] [address]       No family history on file. Allergies:  Patient has no known allergies. Current Medications:    lactated ringers infusion, Continuous  polyethylene glycol (GLYCOLAX) packet 17 g, Daily  sennosides-docusate sodium (SENOKOT-S) 8.6-50 MG tablet 2 tablet, BID  voriconazole (VFEND) 170 mg in dextrose 5 % 100 mL IVPB, Q12H  lactulose (CHRONULAC) 10 GM/15ML solution 20 g, Q8H  dexmedetomidine (PRECEDEX) 400 mcg in sodium chloride 0.9 % 100 mL infusion, Continuous  vasopressin 20 Units in dextrose 5 % 100 mL infusion, Continuous  pantoprazole (PROTONIX) injection 40 mg, BID  levoFLOXacin (LEVAQUIN) 750 MG/150ML infusion 750 mg, Q48H  0.9 % sodium chloride infusion, PRN  sodium chloride flush 0.9 % injection 5-40 mL, 2 times per day  sodium chloride flush 0.9 % injection 5-40 mL, PRN  0.9 % sodium chloride infusion, PRN  ondansetron (ZOFRAN-ODT) disintegrating tablet 4 mg, Q8H PRN   Or  ondansetron (ZOFRAN) injection 4 mg, Q6H PRN  acetaminophen (TYLENOL) tablet 650 mg, Q6H PRN   Or  acetaminophen (TYLENOL) suppository 650 mg, Q6H PRN  ipratropium-albuterol (DUONEB) nebulizer solution 1 ampule, 4x daily  potassium chloride 20 mEq/50 mL IVPB (Central Line), PRN  rifaximin (XIFAXAN) tablet 550 mg, BID  norepinephrine (LEVOPHED) 16 mg in dextrose 5% 250 mL infusion, Continuous  PATIENT HOME MEDS STORED IN PHARMACY!!!!, Prior to discharge  fentaNYL 10 mcg/mL infusion, Continuous        Physical exam:     Vitals:  /70   Pulse 70   Temp 97.4 °F (36.3 °C) (Temporal)   Resp 12   Ht 5' 5\" (1.651 m)   Wt 175 lb 11.3 oz (79.7 kg)   SpO2 100%   BMI 29.24 kg/m²   Constitutional:  Intubated   Skin: no rash, turgor wnl  Heent:   Icterus +  Neck: no bruits or jvd noted  Cardiovascular:  S1, S2 without m/r/g  Respiratory: CTA B without w/r/r  Abdomen:  +bs, soft, nt, nd  Ext: no  lower extremity edema  Psychiatric: mood and affect appropriate  Musculoskeletal:  Rom, muscular strength intact    Labs:  CBC:   Recent Labs     09/16/21  0550 09/16/21  1530 09/17/21  0450 09/17/21 2140 09/18/21 0438   WBC 19.8*  --  14.2*  --  14.1*   HGB 7.6*   < > 7.4* 7.6* 7.3*   PLT 50*  --  38*  --  38*    < > = values in this interval not displayed. BMP:    Recent Labs     09/16/21  0550 09/16/21  0550 09/17/21  0450 09/17/21  1300 09/17/21 2140 09/18/21 0438     --  151*  --   --  152*   K 3.3*   < > 2.6* 2.8* 3.1* 3.5     --  117*  --   --  121*   CO2 20*  --  20*  --   --  20*   BUN 75*  --  72*  --   --  70*   CREATININE 2.7*  --  2.3*  --   --  1.7*   GLUCOSE 149*  --  107*  --   --  101*    < > = values in this interval not displayed. Ca/Mg/Phos:   Recent Labs     09/16/21  0550 09/17/21 0450 09/18/21 0438   CALCIUM 9.7 9.5 9.7   MG 2.30 2.00 1.90   PHOS 4.1 3.4 2.8     Hepatic:   Recent Labs     09/16/21  0550 09/17/21  0450 09/18/21 0438   * 104* 157*   * 91* 97*   BILITOT 31.1* 26.2* 25.5*   ALKPHOS 182* 162* 164*     Troponin:   No results for input(s): TROPONINI in the last 72 hours. BNP: No results for input(s): BNP in the last 72 hours. Lipids: No results for input(s): CHOL, TRIG, HDL, LDLCALC, LABVLDL in the last 72 hours. ABGs:   Recent Labs     09/18/21  0438   PHART 7.364   PO2ART 96.0   MOR0SLX 35.0     INR:   Recent Labs     09/16/21  1530 09/17/21  0745 09/18/21  0438   INR 2.88* 3.03* 3.04*       IMAGING:  XR ABDOMEN (KUB) (SINGLE AP VIEW)   Final Result   Moderate persistent diffuse small bowel distension without evident colonic   distension. Distal small bowel obstruction could be present. RECOMMENDATION:   Continued follow-up with serial films suggested. Repeat CT scan suggested if   patient not improving.          XR CHEST PORTABLE   Final Result acute cholecystitis. 3. Suspected gallbladder lumen dependent radiodense sludge. 4. Mild cardiomegaly. CT Head WO Contrast   Final Result   No acute intracranial abnormality. XR CHEST PORTABLE   Final Result   Endotracheal tube and orogastric tube are acceptable. Bibasilar opacities are again noted. XR CHEST PORTABLE   Final Result   Reticulation at the left base more than right is nonspecific. Could relate   to subsegmental atelectasis, aspiration pneumonitis, or early pneumonia. CT ABDOMEN PELVIS WO CONTRAST Additional Contrast? None    (Results Pending)           Assessment/Plan :      1. CHAO 2/2 ATN from hypotension / shock. Anemia   Renal function improving slowly   UOP improved   Recommend to dose adjust all medications  based on renal functions  Maintain SBP> 90 mmHg   Daily weights   AVOID NSAIDs  Avoid Nephrotoxins  Monitor Intake/Output  Call if significant decrease in urine output     2. Now developed hypernatremia   Add free water flushes   Improved. Continue to monitor. 3.  Hypotension/ shock state   On levophed   Give albumin as needed     4. High anion gap acidosis. Resolved. 5. Hypokalemia - replace iv today     6. Hepatic failure/Encephalopathy   GI following.            Recommend to dose adjust all medications  based on renal functions  Maintain SBP> 90 mmHg   Daily weights   AVOID NSAIDs  Avoid Nephrotoxins  Monitor Intake/Output  Call if significant decrease in urine output               Thank you for allowing us to participate in care of Ro Pérez         Electronically signed by: Chase Glez MD, 9/18/2021, 12:19 PM      [facility] [address]  Office : [phone]  Fax :[phone]

## 2021-09-18 NOTE — PROGRESS NOTES
Hospitalist Progress Note      PCP: No primary care provider on file. Date of Admission: 9/9/2021    Chief Complaint:     Altered Mental Status       pt brought to ed by [facility] after being found by family sitting in chair unresponsive but jill bs for squad was 79 1 amp given in route patient moaning at triage           Hospital Course:      No family at bedside to provide collateral history. History obtained from discussion with ER provider  Apparently file still being managed and has presented by different names in the past     The patient is a unknown y.o. adult with history of cirrhosis who apparently presented with family having been found by family poorly responsive who initiated CPR. EMS was called and on arrival to the ER patient was intubated. Post intubation he developed hypotension requiring fluid resuscitation with some improvement. Laboratory work-up was noted for ammonia of 136, lipase of 166, troponin of 0.10, sodium 122, potassium 2.9, CO2 of 18, creatinine of 2.0 and transaminitis with ,   CT chest abdomen pelvis revealed cirrhosis with portal hypertension and diffuse gallbladder thickening concerning for acute versus chronic cholecystitis with gallbladder sludge  CT brain with no acute intracranial pathology  EKG sinus rhythm  Urinalysis negative  He is notably jaundiced    Subjective:      Intubated sedated, FiO2 30%, still on Levophed 4 mics, LFTs worsening, hypernatremia, creatinine improving 1.7 ammonia improving 55    Medications:  Reviewed    Infusion Medications    lactated ringers 50 mL/hr at 09/18/21 1129    dexmedetomidine 1.1 mcg/kg/hr (09/18/21 1139)    vasopressin (Septic Shock) infusion Stopped (09/16/21 1100)    sodium chloride      sodium chloride 10 mL/hr at 09/18/21 0449    norepinephrine 3 mcg/min (09/17/21 2304)    fentaNYL 100 mcg/hr (09/18/21 0928)     Scheduled Medications    polyethylene glycol  17 g Oral Daily    sennosides-docusate sodium  2 tablet Oral BID    voriconazole  2 mg/kg IntraVENous Q12H    lactulose  20 g Per NG tube Q8H    pantoprazole  40 mg IntraVENous BID    levofloxacin  750 mg IntraVENous Q48H    sodium chloride flush  5-40 mL IntraVENous 2 times per day    ipratropium-albuterol  1 ampule Inhalation 4x daily    rifaximin  550 mg Per NG tube BID    Empty Capsule #00 Purple/White  1 each Does not apply Prior to discharge     PRN Meds: sodium chloride, sodium chloride flush, sodium chloride, ondansetron **OR** ondansetron, acetaminophen **OR** acetaminophen, potassium chloride      Intake/Output Summary (Last 24 hours) at 9/18/2021 1354  Last data filed at 9/18/2021 0449  Gross per 24 hour   Intake 1875.07 ml   Output 2475 ml   Net -599.93 ml       Physical Exam Performed:    /72   Pulse 64   Temp 96.4 °F (35.8 °C) (Temporal)   Resp 13   Ht 5' 5\" (1.651 m)   Wt 175 lb 11.3 oz (79.7 kg)   SpO2 100%   BMI 29.24 kg/m²     General appearance: visibly jaundiced  HEENT: Pupils equal, round, and reactive to light. Has scleral icterus. Neck: Supple, with full range of motion. No jugular venous distention. Trachea midline. Respiratory:  Vented. . Clear to auscultation, bilaterally without Rales/Wheezes/Rhonchi. Cardiovascular: Regular rate and rhythm with normal S1/S2 without murmurs, rubs or gallops. Abdomen: Soft,  mildlyy distended. BS +   Musculoskeletal: No clubbing, cyanosis or edema bilaterally. Full range of motion without deformity. Skin: Skin color, texture, turgor normal.  No rashes or lesions.   Neurologic: Intubated and sedated  Psychiatric: sedated  Capillary Refill: Brisk,3 seconds, normal   Peripheral Pulses: +2 palpable, equal bilaterally       Labs:   Recent Labs     09/16/21  0550 09/16/21  1530 09/17/21  0450 09/17/21  2140 09/18/21  0438   WBC 19.8*  --  14.2*  --  14.1*   HGB 7.6*   < > 7.4* 7.6* 7.3*   HCT 21.8*   < > 21.3* 22.1* 21.3*   PLT 50*  --  38*  --  38*    < > = values in this interval not displayed. Recent Labs     09/16/21  0550 09/16/21  0550 09/17/21  0450 09/17/21  1300 09/17/21  2140 09/18/21  0438     --  151*  --   --  152*   K 3.3*   < > 2.6* 2.8* 3.1* 3.5     --  117*  --   --  121*   CO2 20*  --  20*  --   --  20*   BUN 75*  --  72*  --   --  70*   CREATININE 2.7*  --  2.3*  --   --  1.7*   CALCIUM 9.7  --  9.5  --   --  9.7   PHOS 4.1  --  3.4  --   --  2.8    < > = values in this interval not displayed. Recent Labs     09/16/21  0550 09/17/21  0450 09/18/21  0438   * 104* 157*   * 91* 97*   BILITOT 31.1* 26.2* 25.5*   ALKPHOS 182* 162* 164*     Recent Labs     09/16/21  1530 09/17/21  0745 09/18/21  0438   INR 2.88* 3.03* 3.04*     Recent Labs     09/16/21  0550 09/17/21  0450 09/18/21  0438   CKTOTAL 51 45 38*       Urinalysis:      Lab Results   Component Value Date    NITRU Negative 09/09/2021    WBCUA 0-2 09/09/2021    BACTERIA 1+ 09/09/2021    RBCUA 3-4 09/09/2021    BLOODU TRACE-LYSED 09/09/2021    SPECGRAV 1.020 09/09/2021    GLUCOSEU 100 09/09/2021       Radiology:  XR ABDOMEN (KUB) (SINGLE AP VIEW)   Final Result   Moderate persistent diffuse small bowel distension without evident colonic   distension. Distal small bowel obstruction could be present. RECOMMENDATION:   Continued follow-up with serial films suggested. Repeat CT scan suggested if   patient not improving. XR CHEST PORTABLE   Final Result   Slightly improved aeration in the lower lung zones bilaterally. CT ABDOMEN PELVIS WO CONTRAST Additional Contrast? None   Final Result   1. Diffuse gastrointestinal distension, likely due to ileus   2. Fluid throughout the colon and rectum. Correlate with any history of   diarrhea. No evident colonic wall thickening to indicate colitis   3. Cirrhosis with small ascites   4. Stones versus sludge in the gallbladder   5.  Atelectasis or pneumonia in the lung bases with small left pleural effusion XR CHEST PORTABLE   Final Result   Low lung volumes with bibasilar effusions. Support tubes as described above. XR CHEST PORTABLE   Final Result   Bibasilar hypoaeration again noted with persistent left basilar opacity   suggesting atelectasis         IR US GUIDED PARACENTESIS   Final Result   Successful ultrasound guided paracentesis. US GALLBLADDER RUQ   Final Result   Gallbladder wall is thickened. This is a nonspecific finding in the presence   of ascites as it may simply be reactive, rather than secondary to underlying   cholecystitis      Low level echoes are seen within the gallbladder, either stones or sludge. Cirrhosis with moderate ascites         XR CHEST PORTABLE   Final Result   New central venous catheter as above, approach is unclear. CT ABDOMEN PELVIS WO CONTRAST Additional Contrast? None   Final Result   1. Hepatic cirrhosis with associated portal venous hypertension and scattered   moderate intraperitoneal free fluid. 2. Diffuse gallbladder wall thickening suggesting association with chronic   versus acute cholecystitis. 3. Suspected gallbladder lumen dependent radiodense sludge. 4. Mild cardiomegaly. CT CHEST WO CONTRAST   Final Result   1. Hepatic cirrhosis with associated portal venous hypertension and scattered   moderate intraperitoneal free fluid. 2. Diffuse gallbladder wall thickening suggesting association with chronic   versus acute cholecystitis. 3. Suspected gallbladder lumen dependent radiodense sludge. 4. Mild cardiomegaly. CT Head WO Contrast   Final Result   No acute intracranial abnormality. XR CHEST PORTABLE   Final Result   Endotracheal tube and orogastric tube are acceptable. Bibasilar opacities are again noted. XR CHEST PORTABLE   Final Result   Reticulation at the left base more than right is nonspecific.   Could relate   to subsegmental atelectasis, aspiration pneumonitis, or early pneumonia. CT ABDOMEN PELVIS WO CONTRAST Additional Contrast? None    (Results Pending)           Assessment/Plan:    Active Hospital Problems    Diagnosis     Hepatic encephalopathy ([facility] [address] [K72.90]     Hyperammonemia ([facility] [address] [E72.20]     Elevated lactic acid level [O54.68]     Metabolic acidosis, increased anion gap (IAG) [E87.2]     Elevated troponin [R77.8]     Hypokalemia [E87.6]     Hyponatremia [E87.1]     Hypotension [I95.9]     Anemia [D64.9]     GI bleed [K92.2]     Transaminitis [R74.01]        Acute respiratory failure/pneumonia  Secondary to hepatic encephalopathy and ascites. He remains intubated and sedated, CCM on board    Growing stenotrophomonas in sputum currently on Levaquin    Hepatic encephalopathy  Appears to be end-stage cirrhosis at this time or at minimum decompensated cirrhosis. Ammonia 72>86>76>60, on lactulose and rifaximin, GI on board. Hypernatremia-sodium 152, nephrology on board, increase free water to 250 mill every 6hr     Elevated liver functions and jaundice  Appears to be related to alcoholic cirrhosis  Unclear if the patient has any hepatitis history. GI is following    Diffuse gastrointestinal distention likely due to ileus, on abdominal x-ray, NG tube, rectal tube per GI, can give lactulose rectal, repeated KUB 9/18 with moderate persistent diffuse small bowel distention without evident colonic distention,? Distal small bowel obstruction, discussed with critical care medicine, will obtain CT scan abdomen, may consider surgical consult. Acute metabolic acidosis  Appears to be a combination of acute kidney injury possible sepsis lactic acidosis and decompensated cirrhosis.   We are working to correct the patient's abnormalities  Nephrology is following as well, had albumin, no urgent RRT   Creatinine 3.4>1.7 trending down    Acute kidney injury  Secondary to all of the above, Cr 3.4>2.7>2.3  Nephrology is following as noted above  Continue to monitor renal function     Anemia with possible GI bleed  Patient has been started on octreotide  He is on PPI as well  Gastroenterology has been consulted for his cirrhosis as well as for the possible bleed. GI has determined that this is secondary to hemorrhoids General surgery was consulted it would be more dangerous to do a surgery for the hemorrhoid then it would be to just allow it to ooze a little bit.   Banding would also be more dangerous     Thrombocytopenia  Secondary to liver disease    DVT Prophylaxis: SCDs  Diet: Diet NPO  Code Status: Full Code    PT/OT Eval Status: When appropriate    Dispo -patient is critically ill medical decision making is high    35 minutes of critical care time spent    Kaylee Hanna MD

## 2021-09-18 NOTE — PROGRESS NOTES
[facility] Pulmonary/CCM Progress note      Admit Date: 9/9/2021    Chief Complaint: Altered mental status    Subjective: Interval History: Patient continues to remain critically ill. On full mechanical ventilatory support. Hemodynamics slowly stabilizing. Still on low-dose vasopressor support. Ileus is slowly resolved. Abdominal distention again worsening. Respiratory culture growing stenotrophomonas as well as mold. Poor renal function. Remains coagulopathic and seen to have rectal bleeding. Scheduled Meds:   polyethylene glycol  17 g Oral Daily    sennosides-docusate sodium  2 tablet Oral BID    voriconazole  2 mg/kg IntraVENous Q12H    lactulose  20 g Per NG tube Q8H    pantoprazole  40 mg IntraVENous BID    levofloxacin  750 mg IntraVENous Q48H    sodium chloride flush  5-40 mL IntraVENous 2 times per day    ipratropium-albuterol  1 ampule Inhalation 4x daily    rifaximin  550 mg Per NG tube BID    Empty Capsule #00 Purple/White  1 each Does not apply Prior to discharge     Continuous Infusions:   lactated ringers 50 mL/hr at 09/18/21 1129    dexmedetomidine 1.1 mcg/kg/hr (09/18/21 1139)    vasopressin (Septic Shock) infusion Stopped (09/16/21 1100)    sodium chloride      sodium chloride 10 mL/hr at 09/18/21 0449    norepinephrine 3 mcg/min (09/17/21 2304)    fentaNYL 100 mcg/hr (09/18/21 1439)     PRN Meds:sodium chloride, sodium chloride flush, sodium chloride, ondansetron **OR** ondansetron, acetaminophen **OR** acetaminophen, potassium chloride    Review of Systems  Unable to obtain since patient is currently intubated and sedated    Objective:     I/O last 3 completed shifts: In: 1560.3 [I.V.:921.8; NG/GT:100; IV Piggyback:538.6]  Out: 5746 [Urine:1000; Emesis/NG output:500; Stool:75]  No intake/output data recorded.     General Appearance: Intubated and sedated, in no acute distress  Skin: warm and dry, no rash or erythema generalized icterus seen  Head: normocephalic and atraumatic  Eyes: pupils equal, round, and reactive to light, extraocular eye movements intact, conjunctivae normal  ENT: external ear and ear canal normal bilaterally, nose without deformity, nasal mucosa and turbinates normal  Neck: supple and non-tender without mass, no cervical lymphadenopathy  Pulmonary/Chest: clear to auscultation bilaterally- no wheezes, rales or rhonchi, normal air movement, no respiratory distress  Cardiovascular: normal rate, regular rhythm,  no murmurs, rubs, distal pulses intact, no carotid bruits  Abdomen: soft, non-tender, non-distended, normal bowel sounds, no masses or organomegaly  Lymph Nodes: Cervical, supraclavicular normal  Extremities: no cyanosis, clubbing or edema  Musculoskeletal: normal range of motion, no joint swelling, deformity or tenderness  Neurologic: Sedated, no focal neurologic deficits    Data Review:  LABS:  Recent Labs     09/16/21  0550 09/16/21  0550 09/16/21  1530 09/16/21  2315 09/17/21  0450 09/17/21  0745 09/17/21  1300 09/17/21  2140 09/18/21  0438 09/18/21  1345   WBC 19.8*  --   --   --  14.2*  --   --   --  14.1*  --    HGB 7.6*   < > 7.8*   < > 7.4*  --   --  7.6* 7.3*  --    HCT 21.8*   < > 22.3*   < > 21.3*  --   --  22.1* 21.3*  --    PLT 50*  --   --   --  38*  --   --   --  38*  --    *  --   --   --  91*  --   --   --  97*  --    *  --   --   --  104*  --   --   --  157*  --       < >  --   --  151*  --   --   --  152* 154*   K 3.3*   < >  --   --  2.6*  --    < > 3.1* 3.5 3.1*      < >  --   --  117*  --   --   --  121* 122*   CREATININE 2.7*   < >  --   --  2.3*  --   --   --  1.7* 1.2   BUN 75*   < >  --   --  72*  --   --   --  70* 70*   CO2 20*   < >  --   --  20*  --   --   --  20* 19*   INR  --   --  2.88*  --   --  3.03*  --   --  3.04*  --     < > = values in this interval not displayed.        Recent Labs     09/17/21  0450 09/17/21  1300 09/17/21  2140 09/18/21  0438 09/18/21  1345   GLUCOSE 107*  --   -- 101* 127*   CALCIUM 9.5  --   --  9.7 9.6   *  --   --  152* 154*   K 2.6*   < > 3.1* 3.5 3.1*   CO2 20*  --   --  20* 19*   *  --   --  121* 122*   BUN 72*  --   --  70* 70*   CREATININE 2.3*  --   --  1.7* 1.2    < > = values in this interval not displayed. Recent Labs     09/16/21  0550 09/17/21  0450 09/18/21  0438   PHART 7.345* 7.376 7.364   FKM2SWV 37.6 35.3 35.0   PO2ART 119.0* 113.0* 96.0   AEY9WCH 20.5* 20.7* 19.9*   A8WGDBGN 99.7 99.8 98.1   BEART -4.7* -4.1* -5.0*       Lab Results   Component Value Date    INR 3.04 (H) 09/18/2021    INR 3.03 (H) 09/17/2021    INR 2.88 (H) 09/16/2021    PROTIME 36.0 (H) 09/18/2021    PROTIME 35.9 (H) 09/17/2021    PROTIME 34.0 (H) 09/16/2021     No results found for: AMYLASE   No results found for: LABA1C  No results found for: EAG  No results found for: TSH, W4IYMWC, D0VUDJW, THYROIDAB, FT3, T4FREE  Lab Results   Component Value Date    CKTOTAL 38 (L) 09/18/2021    TROPONINI 0.09 (H) 09/10/2021      No results found for: CRP   No results found for: BNP   No results found for: DDIMER   No results found for: FERRITIN   Lab Results   Component Value Date    LACTA 1.4 09/18/2021       Radiology: All pertinent images / reports were reviewed as a part of this visit.     Narrative   EXAMINATION:   ONE XRAY VIEW OF THE CHEST       9/16/2021 7:20 am       FINDINGS:   Supportive devices are stable.  The heart is enlarged.  Mild vascular   congestion stable centrally.  Persistent small left pleural effusion with   improving effusion on the right.  Minimal bibasilar ground-glass opacities   appear slightly improved.  No skeletal finding.           Impression   Slightly improved aeration in the lower lung zones bilaterally.               Problem List:     Hepatic encephalopathy, worsening  Acute versus chronic cholecystitis  Alcoholic cirrhosis with hepatitis  Hyperammonemia, persistent  Hypovolemic versus septic shock  Ileus  Anemia  Nonbleeding esophageal varices  Hemorrhoids, actively bleeding  Hypernatremia  CHAO, resolving  Stenotrophomonas infection  Respiratory fungal infection    Assessment/Plan:     Patient is currently intubated for airway protection. ABG showing stable gas exchange. Currently on full mechanical ventilatory support. Patient unable to tolerate spontaneous breathing trial for long durations as he has worsening abdominal distention. Hepatic encephalopathy/hyperammonemia. Ammonia levels are slowly normalizing. Patient continues on lactulose and rifaximin as per GI recommendations. Has developed ileus. On NG tube decompression. Unable to place a rectal tube as patient has bleeding hemorrhoids and coagulopathy. Abdominal distention worsening again. KUB done this morning showed moderate persistent diffuse small bowel distention without evident colonic distention. We will get a repeat CT abdomen/pelvis to rule out any intestinal obstruction. Also has developed septic shock most likely due to stenotrophomonas and fungal infection in the lungs. Hemodynamics are slowly stabilizing. Continues on low-dose Levophed. Vasopressin is off. Titrate to a systolic blood pressure of 90 mmHg and above. Now isolating stenotrophomonas and mold from respiratory culture. Currently on Levaquin and voriconazole. Leukocytosis downtrending. Underwent EGD earlier this admission with nonbleeding esophageal varices seen. Hemorrhoidal bleeding seen. General surgery consulted. No surgical interventions suggested. Received 2 units of PRBC and 6 units of FFP so far. INR 2.85. Receiving daily vitamin K 10 mg IV. Hemoglobin again trending down. Will transfuse if hemoglobin is below 7 g/dL. Underwent ultrasound-guided paracentesis earlier this admission. Hyponatremia/CHAO: Hepatorenal syndrome. Slowly stabilizing. Urine output picking up. Patient received albumin infusions for potential HRS. Potassium adequately corrected. Nephrology on board. Will follow the recommendation. Has developed hypernatremia most likely due to intravascular depletion. We will start him on LR at 50 cc/h. Tube feeds on hold due to ileus. On IV Protonix twice a day. Patient has end-stage liver disease with recurrent ascites, severe portal hypertension. We have reached out to [facility] liver transplant center, but as of now they do not have any availability to accept the patient for evaluation of liver transplant. I have discussed the case with both the  hospitalist in detail. Total critical care time caring for this patient with life threatening illness, including direct patient contact, management of life support systems, review of data including imaging and labs, discussions with other team members and physicians is at least 34 minutes so far today, excluding procedures.         Belen Kaminski MD   Pulmonary Critical Care and Sleep Medicine  [address] [facility],[address]   [address], [address] [place] [address] Drive  9/9/2021, 4:06 PM

## 2021-09-18 NOTE — PROGRESS NOTES
Shift assessment complete, VSS, see flowsheets. AM meds administered, see MAR. 250 mL free water flush administered.

## 2021-09-18 NOTE — PROGRESS NOTES
Bathed with CHG. Peterson care and cindi care provided. Pt feels hot to the touch. 99.6 temporal temp. Checked rectal temp= 103. 6. packed with ice packs to groin and axilla. No Tylenol provided d/t liver failure.

## 2021-09-19 NOTE — PROGRESS NOTES
09/18/21 2030   Vent Patient Data   Peak Inspiratory Pressure 18 cmH2O   Mean Airway Pressure 8.6 cmH20   Plateau Pressure 22 SHQ28   Static Compliance 29.35 mL/cmH2O   Dynamic Compliance 38.38 mL/cmH2O

## 2021-09-19 NOTE — PROGRESS NOTES
Gastroenterology Progress Note            Tiffanie Mccarthy is a 55 y.o. male patient. 1. Hepatic encephalopathy ([address]    2. Gastrointestinal hemorrhage, unspecified gastrointestinal hemorrhage type    3. Thickening of wall of gallbladder    4. Liver failure with hepatic coma, unspecified chronicity (HCC)        SUBJECTIVE:   Pt intubated, sedated, critically ill    Physical    VITALS:  /73   Pulse 64   Temp 96.2 °F (35.7 °C) (Temporal)   Resp 17   Ht 5' 5\" (1.651 m)   Wt 175 lb 11.3 oz (79.7 kg)   SpO2 100%   BMI 29.24 kg/m²   TEMPERATURE:  Current - Temp: 96.2 °F (35.7 °C); Max - Temp  Av.3 °F (35.7 °C)  Min: 96.2 °F (35.7 °C)  Max: 96.4 °F (35.8 °C)    Abdomen soft, distended but has normal bowel sounds. NT, no HSM  Intubated sedated . Icteric and jaundiced. Data      Recent Labs     21  0450 21  0450 21  2140 21  0438 21  0509   WBC 14.2*  --   --  14.1* 25.4*   HGB 7.4*   < > 7.6* 7.3* 8.4*   HCT 21.3*   < > 22.1* 21.3* 24.7*   MCV 93.4  --   --  94.2 93.9   PLT 38*  --   --  38* 39*    < > = values in this interval not displayed. Recent Labs     21  0450 21  1300 21  2140 21  0438 21  1345 21  1815 21  0509   *  --   --  152* 154*  --  149*   K 2.6*   < >   < > 3.5 3.1* 3.7 3.5   *  --   --  121* 122*  --  119*   CO2 20*  --   --  20* 19*  --  19*   PHOS 3.4  --   --  2.8  --   --  3.7   BUN 72*  --   --  70* 70*  --  71*   CREATININE 2.3*  --   --  1.7* 1.2  --  1.1    < > = values in this interval not displayed. Recent Labs     21  0450 21  0438 21  0509   * 157* 103*   ALT 91* 97* 96*   BILITOT 26.2* 25.5* 27.8*   ALKPHOS 162* 164* 182*     No results for input(s): LIPASE, AMYLASE in the last 72 hours. ASSESSMENT   1. Alcoholic cirrhosis-  [facility] declined to eval for transplant. MELD 31.    2. Hepatic encephalopathy- ammonia improved.  Pt on xifaxan, lactulose  3. Ascites-  SAAG c/w portal hypertension. Neg for sbp or neutrocytic ascites. 4. Leukocytosis- now back up to 25.   5. CHAO - cr continues to improve  6. Hypernatremia- improved. 7.  Hematochezia from hemorrhoid  8. Anemia-  hgb stable  7's. To 8's    9. Colonic distention. Has normal bowel sounds. Colon is distended but small bowel does not appear distended. At least some of this is due to Lactulose use. Also may have colonic pseudoobstruction from critical illness. 10. Respiratory failure. -  On vent. Has fungal infection in lungs also.       PLAN    1. Cont Abx and antifungals  2. Vent and pressor support  3. Follow hgb  4. Cont xifaxan.    would stop  lactulose due to its side effect of abd distention.    5. Will give tap water enemas.           Jd Narayan MD  [address] and [address] [facility] [address]

## 2021-09-19 NOTE — PROGRESS NOTES
Office : [phone]     Fax :[phone]       Nephrology  Progress Note      Patient's Name: Asia Sheridan  1:29 PM  9/19/2021    Reason for Consult:  CHAO , Hyponatremia     Requesting Physician: Dr. Magdiel Spencer. Chief Complaint:    Chief Complaint   Patient presents with    Altered Mental Status     pt brought to ed by [place] [facility] after being found by family sitting in chair unresponsive but breahing, bs for squad was 79 1 amp given in route patient moaning at triage          History of Present iIlness:    Asia Sheridan is a gentleman with unknown age possible history of cirrhosis who apparently presented with family having been found by family poorly responsive who initiated CPR. EMS was called and on arrival to the ER patient was intubated. Post intubation he developed hypotension requiring fluid resuscitation with some improvement. Laboratory work-up was noted for ammonia of 136, lipase of 166, troponin of 0.10, sodium 122, potassium 2.9, CO2 of 18, creatinine of 2.0 and transaminitis with ,   CT chest abdomen pelvis revealed cirrhosis with portal hypertension and diffuse gallbladder thickening concerning for acute versus chronic cholecystitis with gallbladder sludge  CT brain with no acute intracranial pathology  EKG sinus rhythm  Urinalysis negative  History has been gathered from medical records      Latest sodium level is 119. Interval hx       Intubated on vent support   UOP  Improving         I/O last 3 completed shifts: In: 3179 [I.V.:1864.4; NG/GT:850; IV Piggyback:464.6]  Out: 9822 [Urine:1700; Emesis/NG output:600; Stool:25]    No past medical history on file.     Past Surgical History:   Procedure Laterality Date    COLONOSCOPY N/A 9/13/2021    COLONOSCOPY DIAGNOSTIC performed by Alcide Duverney, MD at [facility] N/A 9/13/2021    EGD DIAGNOSTIC ONLY performed by Alcide Duverney, MD at [address]       No family history on file. Allergies:  Patient has no known allergies. Current Medications:    lactated ringers infusion, Continuous  propofol injection, Titrated  polyethylene glycol (GLYCOLAX) packet 17 g, Daily  sennosides-docusate sodium (SENOKOT-S) 8.6-50 MG tablet 2 tablet, BID  voriconazole (VFEND) 170 mg in dextrose 5 % 100 mL IVPB, Q12H  dexmedetomidine (PRECEDEX) 400 mcg in sodium chloride 0.9 % 100 mL infusion, Continuous  vasopressin 20 Units in dextrose 5 % 100 mL infusion, Continuous  pantoprazole (PROTONIX) injection 40 mg, BID  levoFLOXacin (LEVAQUIN) 750 MG/150ML infusion 750 mg, Q48H  0.9 % sodium chloride infusion, PRN  sodium chloride flush 0.9 % injection 5-40 mL, 2 times per day  sodium chloride flush 0.9 % injection 5-40 mL, PRN  0.9 % sodium chloride infusion, PRN  ondansetron (ZOFRAN-ODT) disintegrating tablet 4 mg, Q8H PRN   Or  ondansetron (ZOFRAN) injection 4 mg, Q6H PRN  acetaminophen (TYLENOL) tablet 650 mg, Q6H PRN   Or  acetaminophen (TYLENOL) suppository 650 mg, Q6H PRN  ipratropium-albuterol (DUONEB) nebulizer solution 1 ampule, 4x daily  potassium chloride 20 mEq/50 mL IVPB (Central Line), PRN  rifaximin (XIFAXAN) tablet 550 mg, BID  norepinephrine (LEVOPHED) 16 mg in dextrose 5% 250 mL infusion, Continuous  PATIENT HOME MEDS STORED IN PHARMACY!!!!, Prior to discharge  fentaNYL 10 mcg/mL infusion, Continuous        Physical exam:     Vitals:  BP (!) 78/51   Pulse 60   Temp 96.1 °F (35.6 °C) (Temporal)   Resp 20   Ht 5' 5\" (1.651 m)   Wt 175 lb 11.3 oz (79.7 kg)   SpO2 100%   BMI 29.24 kg/m²   Constitutional:  Intubated   Skin: no rash, turgor wnl  Heent:   Icterus +  Neck: no bruits or jvd noted  Cardiovascular:  S1, S2 without m/r/g  Respiratory: CTA B without w/r/r  Abdomen:  +bs, soft, nt, nd  Ext: no  lower extremity edema  Psychiatric: mood and affect appropriate  Musculoskeletal:  Rom, muscular strength intact    Labs:  CBC:   Recent Labs     09/17/21 0450 09/17/21 0450 09/17/21 2140 09/18/21 0438 09/19/21  0509   WBC 14.2*  --   --  14.1* 25.4*   HGB 7.4*   < > 7.6* 7.3* 8.4*   PLT 38*  --   --  38* 39*    < > = values in this interval not displayed. BMP:    Recent Labs     09/17/21 2140 09/18/21 0438 09/18/21 1345 09/18/21  1815 09/19/21  0509   NA  --  152* 154*  --  149*   K   < > 3.5 3.1* 3.7 3.5   CL  --  121* 122*  --  119*   CO2  --  20* 19*  --  19*   BUN  --  70* 70*  --  71*   CREATININE  --  1.7* 1.2  --  1.1   GLUCOSE  --  101* 127*  --  120*    < > = values in this interval not displayed. Ca/Mg/Phos:   Recent Labs     09/17/21 0450 09/17/21 0450 09/18/21 0438 09/18/21  1345 09/19/21  0509   CALCIUM 9.5   < > 9.7 9.6 9.6   MG 2.00  --  1.90  --  1.90   PHOS 3.4  --  2.8  --  3.7    < > = values in this interval not displayed. Hepatic:   Recent Labs     09/17/21 0450 09/18/21 0438 09/19/21  0509   * 157* 103*   ALT 91* 97* 96*   BILITOT 26.2* 25.5* 27.8*   ALKPHOS 162* 164* 182*     Troponin:   No results for input(s): TROPONINI in the last 72 hours. BNP: No results for input(s): BNP in the last 72 hours. Lipids: No results for input(s): CHOL, TRIG, HDL, LDLCALC, LABVLDL in the last 72 hours. ABGs:   Recent Labs     09/19/21  0514   PHART 7.324*   PO2ART 123.0*   KPD7DQR 36.0     INR:   Recent Labs     09/17/21  0745 09/18/21  0438 09/19/21  0509   INR 3.03* 3.04* 2.81*       IMAGING:  CT ABDOMEN PELVIS WO CONTRAST Additional Contrast? None   Final Result   1. Compared to 09/15/2021, no significant interval changes appreciated. 2. Liver cirrhosis with mild to moderate volume ascites. Moderate anasarca.    3. Prominent gaseous and fluid distension of the colon, grossly similar as of 09/15/2021.   4. Cholelithiasis and/or sludge in the gallbladder lumen. XR ABDOMEN (KUB) (SINGLE AP VIEW)   Final Result   Moderate persistent diffuse small bowel distension without evident colonic   distension. Distal small bowel obstruction could be present. RECOMMENDATION:   Continued follow-up with serial films suggested. Repeat CT scan suggested if   patient not improving. XR CHEST PORTABLE   Final Result   Slightly improved aeration in the lower lung zones bilaterally. CT ABDOMEN PELVIS WO CONTRAST Additional Contrast? None   Final Result   1. Diffuse gastrointestinal distension, likely due to ileus   2. Fluid throughout the colon and rectum. Correlate with any history of   diarrhea. No evident colonic wall thickening to indicate colitis   3. Cirrhosis with small ascites   4. Stones versus sludge in the gallbladder   5. Atelectasis or pneumonia in the lung bases with small left pleural effusion         XR CHEST PORTABLE   Final Result   Low lung volumes with bibasilar effusions. Support tubes as described above. XR CHEST PORTABLE   Final Result   Bibasilar hypoaeration again noted with persistent left basilar opacity   suggesting atelectasis         IR US GUIDED PARACENTESIS   Final Result   Successful ultrasound guided paracentesis. US GALLBLADDER RUQ   Final Result   Gallbladder wall is thickened. This is a nonspecific finding in the presence   of ascites as it may simply be reactive, rather than secondary to underlying   cholecystitis      Low level echoes are seen within the gallbladder, either stones or sludge. Cirrhosis with moderate ascites         XR CHEST PORTABLE   Final Result   New central venous catheter as above, approach is unclear. CT ABDOMEN PELVIS WO CONTRAST Additional Contrast? None   Final Result   1. Hepatic cirrhosis with associated portal venous hypertension and scattered   moderate intraperitoneal free fluid.    2. Diffuse gallbladder wall thickening suggesting association with chronic   versus acute cholecystitis. 3. Suspected gallbladder lumen dependent radiodense sludge. 4. Mild cardiomegaly. CT CHEST WO CONTRAST   Final Result   1. Hepatic cirrhosis with associated portal venous hypertension and scattered   moderate intraperitoneal free fluid. 2. Diffuse gallbladder wall thickening suggesting association with chronic   versus acute cholecystitis. 3. Suspected gallbladder lumen dependent radiodense sludge. 4. Mild cardiomegaly. CT Head WO Contrast   Final Result   No acute intracranial abnormality. XR CHEST PORTABLE   Final Result   Endotracheal tube and orogastric tube are acceptable. Bibasilar opacities are again noted. XR CHEST PORTABLE   Final Result   Reticulation at the left base more than right is nonspecific. Could relate   to subsegmental atelectasis, aspiration pneumonitis, or early pneumonia. Assessment/Plan :      1. CHAO 2/2 ATN from hypotension / shock. Anemia   Renal function improving slowly   UOP improved   Recommend to dose adjust all medications  based on renal functions  Maintain SBP> 90 mmHg   Daily weights   AVOID NSAIDs  Avoid Nephrotoxins  Monitor Intake/Output  Call if significant decrease in urine output     2. Now developed hypernatremia   Added free water flushes   Improved. Continue to monitor. 3.  Hypotension/ shock state   On levophed   Give albumin as needed     4. High anion gap acidosis. Resolved. 5. Hypokalemia - replace iv today     6. Hepatic failure/Encephalopathy   GI following.            Recommend to dose adjust all medications  based on renal functions  Maintain SBP> 90 mmHg   Daily weights   AVOID NSAIDs  Avoid Nephrotoxins  Monitor Intake/Output  Call if significant decrease in urine output               Thank you for allowing us to participate in care of Allison Brown         Electronically signed by: Misbah Kaiser MD, 9/19/2021, 1:29 PM      [facility] [address]  Office : [phone]  Fax :[phone]

## 2021-09-19 NOTE — PROGRESS NOTES
rash or erythema generalized icterus seen  Head: normocephalic and atraumatic  Eyes: pupils equal, round, and reactive to light, extraocular eye movements intact, conjunctivae normal  ENT: external ear and ear canal normal bilaterally, nose without deformity, nasal mucosa and turbinates normal  Neck: supple and non-tender without mass, no cervical lymphadenopathy  Pulmonary/Chest: clear to auscultation bilaterally- no wheezes, rales or rhonchi, normal air movement, no respiratory distress  Cardiovascular: normal rate, regular rhythm,  no murmurs, rubs, distal pulses intact, no carotid bruits  Abdomen: Distended without any fluid thrill. Decreased bowel sounds. Lymph Nodes: Cervical, supraclavicular normal  Extremities: no cyanosis, clubbing or edema  Musculoskeletal: normal range of motion, no joint swelling, deformity or tenderness  Neurologic: Sedated, no focal neurologic deficits    Data Review:  LABS:  Recent Labs       0000 09/17/21  0450 09/17/21  0450 09/17/21  0745 09/17/21  1300 09/17/21  2140 09/17/21  2140 09/18/21  0438 09/18/21  1345 09/18/21  1815 09/19/21  0509   WBC  --  14.2*  --   --   --   --   --  14.1*  --   --  25.4*   HGB   < > 7.4*  --   --   --  7.6*  --  7.3*  --   --  8.4*   HCT   < > 21.3*  --   --   --  22.1*  --  21.3*  --   --  24.7*   PLT  --  38*  --   --   --   --   --  38*  --   --  39*   ALT  --  91*  --   --   --   --   --  97*  --   --  96*   AST  --  104*  --   --   --   --   --  157*  --   --  103*   NA  --  151*   < >  --   --   --   --  152* 154*  --  149*   K  --  2.6*   < >  --    < > 3.1*   < > 3.5 3.1* 3.7 3.5   CL  --  117*   < >  --   --   --   --  121* 122*  --  119*   CREATININE  --  2.3*   < >  --   --   --   --  1.7* 1.2  --  1.1   BUN  --  72*   < >  --   --   --   --  70* 70*  --  71*   CO2  --  20*   < >  --   --   --   --  20* 19*  --  19*   INR  --   --   --  3.03*  --   --   --  3.04*  --   --  2.81*    < > = values in this interval not displayed. Recent Labs     09/17/21  2140 09/18/21  0438 09/18/21  1345 09/18/21  1815 09/19/21  0509   GLUCOSE  --  101* 127*  --  120*   CALCIUM  --  9.7 9.6  --  9.6   NA  --  152* 154*  --  149*   K   < > 3.5 3.1* 3.7 3.5   CO2  --  20* 19*  --  19*   CL  --  121* 122*  --  119*   BUN  --  70* 70*  --  71*   CREATININE  --  1.7* 1.2  --  1.1    < > = values in this interval not displayed. Recent Labs     09/17/21  0450 09/18/21  0438 09/19/21  0514   PHART 7.376 7.364 7.324*   NRV4YPB 35.3 35.0 36.0   PO2ART 113.0* 96.0 123.0*   EMH9UPB 20.7* 19.9* 18.7*   V5SZKYAX 99.8 98.1 99.3   BEART -4.1* -5.0* -6.7*       Lab Results   Component Value Date    INR 2.81 (H) 09/19/2021    INR 3.04 (H) 09/18/2021    INR 3.03 (H) 09/17/2021    PROTIME 33.1 (H) 09/19/2021    PROTIME 36.0 (H) 09/18/2021    PROTIME 35.9 (H) 09/17/2021     No results found for: AMYLASE   No results found for: LABA1C  No results found for: EAG  No results found for: TSH, A0YIQGD, Q3SRCSY, THYROIDAB, FT3, T4FREE  Lab Results   Component Value Date    CKTOTAL 28 (L) 09/19/2021    TROPONINI 0.09 (H) 09/10/2021      No results found for: CRP   No results found for: BNP   No results found for: DDIMER   No results found for: FERRITIN   Lab Results   Component Value Date    LACTA 1.4 09/19/2021       Radiology: All pertinent images / reports were reviewed as a part of this visit.     Narrative   EXAMINATION:   ONE XRAY VIEW OF THE CHEST       9/16/2021 7:20 am       FINDINGS:   Supportive devices are stable.  The heart is enlarged.  Mild vascular   congestion stable centrally.  Persistent small left pleural effusion with   improving effusion on the right.  Minimal bibasilar ground-glass opacities   appear slightly improved.  No skeletal finding.           Impression   Slightly improved aeration in the lower lung zones bilaterally.             Narrative   EXAMINATION:   CT OF THE ABDOMEN AND PELVIS WITHOUT CONTRAST 9/18/2021 5:02 pm           FINDINGS: Lower Chest: Dependent atelectasis is seen in the bilateral lower lobes. Small left pleural effusion.  The heart is normal in size.       Organs:       Liver: Slight irregularity of the liver contour indicating cirrhosis.  No   focal lesion identified.       Gallbladder: Hyperdensity in the gallbladder lumen may be due to sludge   and/or stones.       Pancreas: Unremarkable.       Spleen:  Unremarkable.       Adrenals: Unremarkable.       Kidneys: Unremarkable.       GI/Bowel: Prominent gaseous and fluid distention of the colon.  Rectal tube   in situ.  No gross bowel wall thickening or obstruction is seen.       Pelvis: The urinary bladder is decompressed by a  Peterson catheter.  It is   therefore not well evaluated.  Within this limitation, no gross abnormality   is detected.  The prostate gland is normal in size.       Peritoneum/Retroperitoneum: The abdominal aorta and pelvic arteries are   unremarkable.  No lymphadenopathy is seen in the abdomen or pelvis. Mild-to-moderate volume ascites.  No free air.       Bones/Soft Tissues: The visualized bones are intact without fracture or focal   lesion.           Impression   1. Compared to 09/15/2021, no significant interval changes appreciated. 2. Liver cirrhosis with mild to moderate volume ascites.  Moderate anasarca. 3. Prominent gaseous and fluid distension of the colon, grossly similar as of   09/15/2021.   4. Cholelithiasis and/or sludge in the gallbladder lumen.                 Problem List:     Hepatic encephalopathy, persistent  Acute versus chronic cholecystitis  Alcoholic cirrhosis with hepatitis  Hyperammonemia, persistent  Hypovolemic versus septic shock  Ileus  Anemia  Nonbleeding esophageal varices  Hemorrhoids, actively bleeding  Hypernatremia  CHAO, resolving  Stenotrophomonas infection  Respiratory fungal infection    Assessment/Plan:     Patient is currently intubated for airway protection. ABG showing metabolic acidosis.   Currently on full to [facility] liver transplant center, but as of now they do not have any availability to accept the patient for evaluation of liver transplant. I have discussed the case with both the  hospitalist in detail. Total critical care time caring for this patient with life threatening illness, including direct patient contact, management of life support systems, review of data including imaging and labs, discussions with other team members and physicians is at least 32 minutes so far today, excluding procedures.         Eda Demarco MD   Pulmonary Critical Care and Sleep Medicine  [facility]   [address] [place] [id number], [facility], [address]  9/9/2021, 4:00 PM

## 2021-09-19 NOTE — PROGRESS NOTES
Message sent to Dr Hector Morrison. Pt is very restless and RN is having a difficult time providing care. Propofol ordered.

## 2021-09-19 NOTE — PROGRESS NOTES
Hospitalist Progress Note      PCP: No primary care provider on file. Date of Admission: 9/9/2021    Chief Complaint:     Altered Mental Status       pt brought to ed by [facility] after being found by family sitting in chair unresponsive but jill bs for squad was 79 1 amp given in route patient moaning at triage           Hospital Course:      No family at bedside to provide collateral history. History obtained from discussion with ER provider  Apparently file still being managed and has presented by different names in the past     The patient is a unknown y.o. adult with history of cirrhosis who apparently presented with family having been found by family poorly responsive who initiated CPR. EMS was called and on arrival to the ER patient was intubated. Post intubation he developed hypotension requiring fluid resuscitation with some improvement. Laboratory work-up was noted for ammonia of 136, lipase of 166, troponin of 0.10, sodium 122, potassium 2.9, CO2 of 18, creatinine of 2.0 and transaminitis with ,   CT chest abdomen pelvis revealed cirrhosis with portal hypertension and diffuse gallbladder thickening concerning for acute versus chronic cholecystitis with gallbladder sludge  CT brain with no acute intracranial pathology  EKG sinus rhythm  Urinalysis negative  He is notably jaundiced    Subjective:      Intubated sedated, FiO2 30%, still on Levophed 4 mics, LFTs worsening, hypernatremia, creatinine improving 1.7 ammonia improving 55      Patient remains intubated and sedated, on Levophed 15 mics, FiO2 30%, leukocytosis 25,000, creatinine 1.1, lactic acid 1.4, CT abdomen result noted, GI is holding lactulose, given tapwater enemas    Medications:  Reviewed    Infusion Medications    lactated ringers 50 mL/hr at 09/18/21 1400    propofol 10 mcg/kg/min (09/19/21 0612)    dexmedetomidine 0.5 mcg/kg/hr (09/19/21 0500)    vasopressin (Septic Shock) infusion Stopped (09/16/21 1100)    sodium chloride      sodium chloride 1,000 mL (09/19/21 0610)    norepinephrine 12 mcg/min (09/19/21 1013)    fentaNYL 100 mcg/hr (09/19/21 1028)     Scheduled Medications    polyethylene glycol  17 g Oral Daily    sennosides-docusate sodium  2 tablet Oral BID    voriconazole  2 mg/kg IntraVENous Q12H    pantoprazole  40 mg IntraVENous BID    levofloxacin  750 mg IntraVENous Q48H    sodium chloride flush  5-40 mL IntraVENous 2 times per day    ipratropium-albuterol  1 ampule Inhalation 4x daily    rifaximin  550 mg Per NG tube BID    Empty Capsule #00 Purple/White  1 each Does not apply Prior to discharge     PRN Meds: sodium chloride, sodium chloride flush, sodium chloride, ondansetron **OR** ondansetron, acetaminophen **OR** acetaminophen, potassium chloride      Intake/Output Summary (Last 24 hours) at 9/19/2021 1158  Last data filed at 9/19/2021 0500  Gross per 24 hour   Intake 3178.99 ml   Output 2325 ml   Net 853.99 ml       Physical Exam Performed:    /64   Pulse 62   Temp 96.1 °F (35.6 °C)   Resp 13   Ht 5' 5\" (1.651 m)   Wt 175 lb 11.3 oz (79.7 kg)   SpO2 100%   BMI 29.24 kg/m²     General appearance: visibly jaundiced  HEENT: Pupils equal, round, and reactive to light. Has scleral icterus. Neck: Supple, with full range of motion. No jugular venous distention. Trachea midline. Respiratory:  Vented. . Clear to auscultation, bilaterally without Rales/Wheezes/Rhonchi. Cardiovascular: Regular rate and rhythm with normal S1/S2 without murmurs, rubs or gallops. Abdomen: Soft,  mildlyy distended. BS +   Musculoskeletal: No clubbing, cyanosis or edema bilaterally. Full range of motion without deformity. Skin: Skin color, texture, turgor normal.  No rashes or lesions.   Neurologic: Intubated and sedated  Psychiatric: sedated  Capillary Refill: Brisk,3 seconds, normal   Peripheral Pulses: +2 palpable, equal bilaterally       Labs:   Recent Labs     09/17/21  2147 09/17/21  0450 09/17/21  2140 09/18/21  0438 09/19/21  0509   WBC 14.2*  --   --  14.1* 25.4*   HGB 7.4*   < > 7.6* 7.3* 8.4*   HCT 21.3*   < > 22.1* 21.3* 24.7*   PLT 38*  --   --  38* 39*    < > = values in this interval not displayed. Recent Labs     09/17/21  0450 09/17/21  1300 09/17/21  2140 09/18/21  0438 09/18/21  1345 09/18/21  1815 09/19/21  0509   *  --   --  152* 154*  --  149*   K 2.6*   < >   < > 3.5 3.1* 3.7 3.5   *  --   --  121* 122*  --  119*   CO2 20*  --   --  20* 19*  --  19*   BUN 72*  --   --  70* 70*  --  71*   CREATININE 2.3*  --   --  1.7* 1.2  --  1.1   CALCIUM 9.5  --   --  9.7 9.6  --  9.6   PHOS 3.4  --   --  2.8  --   --  3.7    < > = values in this interval not displayed. Recent Labs     09/17/21  0450 09/18/21  0438 09/19/21  0509   * 157* 103*   ALT 91* 97* 96*   BILITOT 26.2* 25.5* 27.8*   ALKPHOS 162* 164* 182*     Recent Labs     09/17/21  0745 09/18/21  0438 09/19/21  0509   INR 3.03* 3.04* 2.81*     Recent Labs     09/17/21  0450 09/18/21  0438 09/19/21  0509   CKTOTAL 45 38* 28*       Urinalysis:      Lab Results   Component Value Date    NITRU Negative 09/09/2021    WBCUA 0-2 09/09/2021    BACTERIA 1+ 09/09/2021    RBCUA 3-4 09/09/2021    BLOODU TRACE-LYSED 09/09/2021    SPECGRAV 1.020 09/09/2021    GLUCOSEU 100 09/09/2021       Radiology:  CT ABDOMEN PELVIS WO CONTRAST Additional Contrast? None   Final Result   1. Compared to 09/15/2021, no significant interval changes appreciated. 2. Liver cirrhosis with mild to moderate volume ascites. Moderate anasarca. 3. Prominent gaseous and fluid distension of the colon, grossly similar as of   09/15/2021.   4. Cholelithiasis and/or sludge in the gallbladder lumen. XR ABDOMEN (KUB) (SINGLE AP VIEW)   Final Result   Moderate persistent diffuse small bowel distension without evident colonic   distension. Distal small bowel obstruction could be present.       RECOMMENDATION:   Continued follow-up with serial films suggested. Repeat CT scan suggested if   patient not improving. XR CHEST PORTABLE   Final Result   Slightly improved aeration in the lower lung zones bilaterally. CT ABDOMEN PELVIS WO CONTRAST Additional Contrast? None   Final Result   1. Diffuse gastrointestinal distension, likely due to ileus   2. Fluid throughout the colon and rectum. Correlate with any history of   diarrhea. No evident colonic wall thickening to indicate colitis   3. Cirrhosis with small ascites   4. Stones versus sludge in the gallbladder   5. Atelectasis or pneumonia in the lung bases with small left pleural effusion         XR CHEST PORTABLE   Final Result   Low lung volumes with bibasilar effusions. Support tubes as described above. XR CHEST PORTABLE   Final Result   Bibasilar hypoaeration again noted with persistent left basilar opacity   suggesting atelectasis         IR US GUIDED PARACENTESIS   Final Result   Successful ultrasound guided paracentesis. US GALLBLADDER RUQ   Final Result   Gallbladder wall is thickened. This is a nonspecific finding in the presence   of ascites as it may simply be reactive, rather than secondary to underlying   cholecystitis      Low level echoes are seen within the gallbladder, either stones or sludge. Cirrhosis with moderate ascites         XR CHEST PORTABLE   Final Result   New central venous catheter as above, approach is unclear. CT ABDOMEN PELVIS WO CONTRAST Additional Contrast? None   Final Result   1. Hepatic cirrhosis with associated portal venous hypertension and scattered   moderate intraperitoneal free fluid. 2. Diffuse gallbladder wall thickening suggesting association with chronic   versus acute cholecystitis. 3. Suspected gallbladder lumen dependent radiodense sludge. 4. Mild cardiomegaly. CT CHEST WO CONTRAST   Final Result   1.  Hepatic cirrhosis with associated portal venous hypertension and scattered moderate intraperitoneal free fluid. 2. Diffuse gallbladder wall thickening suggesting association with chronic   versus acute cholecystitis. 3. Suspected gallbladder lumen dependent radiodense sludge. 4. Mild cardiomegaly. CT Head WO Contrast   Final Result   No acute intracranial abnormality. XR CHEST PORTABLE   Final Result   Endotracheal tube and orogastric tube are acceptable. Bibasilar opacities are again noted. XR CHEST PORTABLE   Final Result   Reticulation at the left base more than right is nonspecific. Could relate   to subsegmental atelectasis, aspiration pneumonitis, or early pneumonia. Assessment/Plan:    Active Hospital Problems    Diagnosis     Hepatic encephalopathy ([address] [K72.90]     Hyperammonemia ([address] [E72.20]     Elevated lactic acid level [E47.48]     Metabolic acidosis, increased anion gap (IAG) [E87.2]     Elevated troponin [R77.8]     Hypokalemia [E87.6]     Hyponatremia [E87.1]     Hypotension [I95.9]     Anemia [D64.9]     GI bleed [K92.2]     Transaminitis [R74.01]        Acute respiratory failure/pneumonia  Secondary to hepatic encephalopathy and ascites. He remains intubated and sedated, CCM on board    Growing stenotrophomonas in sputum currently on Levaquin    Hepatic encephalopathy  Appears to be end-stage cirrhosis at this time or at minimum decompensated cirrhosis. Ammonia 72>86>76>60, on lactulose and rifaximin, GI on board. Hypernatremia-sodium 152>149, nephrology on board, increase free water to 250 mill every 6hr     Elevated liver functions and jaundice  Appears to be related to alcoholic cirrhosis  Unclear if the patient has any hepatitis history. GI is following    Diffuse gastrointestinal distention likely due to ileus, on abdominal x-ray, NG tube, rectal tube per GI, can give lactulose rectal, repeated KUB 9/18 with moderate persistent diffuse small bowel distention without evident colonic distention,? Distal small bowel obstruction, discussed with critical care medicine, CT abdomen noted, lactic acid one-point 4 GI holding lactulose. Given tap water enema    Acute metabolic acidosis  Appears to be a combination of acute kidney injury possible sepsis lactic acidosis and decompensated cirrhosis. We are working to correct the patient's abnormalities  Nephrology is following as well, had albumin, no urgent RRT   Creatinine 3.4>1.7 trending down    Acute kidney injury  Secondary to all of the above, Cr 3.4>2.7>2.3  Nephrology is following as noted above  Continue to monitor renal function     Anemia with possible GI bleed  Patient has been started on octreotide  He is on PPI as well  Gastroenterology has been consulted for his cirrhosis as well as for the possible bleed. GI has determined that this is secondary to hemorrhoids General surgery was consulted it would be more dangerous to do a surgery for the hemorrhoid then it would be to just allow it to ooze a little bit.   Banding would also be more dangerous     Thrombocytopenia  Secondary to liver disease    DVT Prophylaxis: SCDs  Diet: Diet NPO  Code Status: Full Code    PT/OT Eval Status: When appropriate    Dispo -patient is critically ill medical decision making is high    35 minutes of critical care time spent    Maggy Lafleur MD

## 2021-09-20 PROBLEM — E44.0 MODERATE MALNUTRITION (HCC): Chronic | Status: ACTIVE | Noted: 2021-01-01

## 2021-09-20 NOTE — PROGRESS NOTES
Pt given a tap water enema of 1,000cc per order via rectal tube. Trickle amount returned, will continue to check on Rectal tube bag for output. Pt tolerated well.

## 2021-09-20 NOTE — PROGRESS NOTES
09/19/21 2052   Critical access hospital Patient Data   Static Compliance 21 mL/cmH2O   Dynamic Compliance 45 mL/cmH2O

## 2021-09-20 NOTE — PROGRESS NOTES
Shift assessment complete. Pt is intubated and sedated. ETT in place size 8, 25 at the lip. OG in place 55 at the lip. Propofol at 20 mcg/kg/min, Fentanyl at 75 mcg/hr. PERRLA. RASS -3   Lung sounds noted to be Diminished. Current Vent Settings: AC/VC Rate 14. Peep 5. Fio2 30% Volume 500. Pt is NSR per monitor. No adventitious heart sounds noted. Bowel sounds Hypoactive. Radial and pedal pulses present. Peterson in place drain scant amount urine. IVs patent and flushing well with  blood return. Pt being turned Q2H to prevent skin breakdown, pillow support provided. Bed locked. Rails x 2. Bed lowered. Alarm on, call light in reach.

## 2021-09-20 NOTE — PROGRESS NOTES
Hospitalist Progress Note      PCP: No primary care provider on file. Date of Admission: 9/9/2021    Chief Complaint:     Altered Mental Status       pt brought to ed by [facility] after being found by family sitting in chair unresponsive but jill bs for squad was 79 1 amp given in route patient moaning at triage           Hospital Course:      No family at bedside to provide collateral history. History obtained from discussion with ER provider  Apparently file still being managed and has presented by different names in the past     The patient is a unknown y.o. adult with history of cirrhosis who apparently presented with family having been found by family poorly responsive who initiated CPR. EMS was called and on arrival to the ER patient was intubated. Post intubation he developed hypotension requiring fluid resuscitation with some improvement. Laboratory work-up was noted for ammonia of 136, lipase of 166, troponin of 0.10, sodium 122, potassium 2.9, CO2 of 18, creatinine of 2.0 and transaminitis with ,   CT chest abdomen pelvis revealed cirrhosis with portal hypertension and diffuse gallbladder thickening concerning for acute versus chronic cholecystitis with gallbladder sludge  CT brain with no acute intracranial pathology  EKG sinus rhythm  Urinalysis negative  He is notably jaundiced    Subjective:      Intubated sedated, FiO2 30%, still on Levophed 4 mics, LFTs worsening, hypernatremia, creatinine improving 1.7 ammonia improving 55    Patient is intubated and sedated, currently FiO2 30%, on Levophed at 16 mics, worsening creatinine 1.9, nephrology on board, given albumin and Lasix, no urgent RRT , abdominal distention KUB pending      Medications:  Reviewed    Infusion Medications    propofol 20 mcg/kg/min (09/20/21 0136)    dexmedetomidine 0.4 mcg/kg/hr (09/20/21 0540)    sodium chloride      sodium chloride 10 mL/hr at 09/20/21 0540    norepinephrine 16 mcg/min (09/20/21 2313)    fentaNYL 75 mcg/hr (09/20/21 5816)     Scheduled Medications    albumin human  25 g IntraVENous Q6H    furosemide  20 mg IntraVENous BID    lactobacillus  1 capsule Oral BID WC    vancomycin  250 mg Oral 2 times per day    metroNIDAZOLE  500 mg IntraVENous Q8H    polyethylene glycol  17 g Oral Daily    sennosides-docusate sodium  2 tablet Oral BID    pantoprazole  40 mg IntraVENous BID    levofloxacin  750 mg IntraVENous Q48H    sodium chloride flush  5-40 mL IntraVENous 2 times per day    ipratropium-albuterol  1 ampule Inhalation 4x daily    rifaximin  550 mg Per NG tube BID    Empty Capsule #00 Purple/White  1 each Does not apply Prior to discharge     PRN Meds: sodium chloride, sodium chloride flush, sodium chloride, ondansetron **OR** ondansetron, acetaminophen **OR** acetaminophen, potassium chloride      Intake/Output Summary (Last 24 hours) at 9/20/2021 1213  Last data filed at 9/20/2021 0545  Gross per 24 hour   Intake 3382.8 ml   Output 925 ml   Net 2457.8 ml       Physical Exam Performed:    BP (!) 92/58   Pulse 65   Temp 94.3 °F (34.6 °C) (Rectal)   Resp 16   Ht 5' 5\" (1.651 m)   Wt 186 lb 11.7 oz (84.7 kg)   SpO2 100%   BMI 31.07 kg/m²     General appearance: visibly jaundiced  HEENT: Pupils equal, round, and reactive to light. Has scleral icterus. Neck: Supple, with full range of motion. No jugular venous distention. Trachea midline. Respiratory:  Vented. . Clear to auscultation, bilaterally without Rales/Wheezes/Rhonchi. Cardiovascular: Regular rate and rhythm with normal S1/S2 without murmurs, rubs or gallops. Abdomen: Soft,  mildlyy distended. BS +   Musculoskeletal: No clubbing, cyanosis or edema bilaterally. Full range of motion without deformity. Skin: Skin color, texture, turgor normal.  No rashes or lesions.   Neurologic: Intubated and sedated  Psychiatric: sedated  Capillary Refill: Brisk,3 seconds, normal   Peripheral Pulses: +2 palpable, equal patient not improving. XR CHEST PORTABLE   Final Result   Slightly improved aeration in the lower lung zones bilaterally. CT ABDOMEN PELVIS WO CONTRAST Additional Contrast? None   Final Result   1. Diffuse gastrointestinal distension, likely due to ileus   2. Fluid throughout the colon and rectum. Correlate with any history of   diarrhea. No evident colonic wall thickening to indicate colitis   3. Cirrhosis with small ascites   4. Stones versus sludge in the gallbladder   5. Atelectasis or pneumonia in the lung bases with small left pleural effusion         XR CHEST PORTABLE   Final Result   Low lung volumes with bibasilar effusions. Support tubes as described above. XR CHEST PORTABLE   Final Result   Bibasilar hypoaeration again noted with persistent left basilar opacity   suggesting atelectasis         IR US GUIDED PARACENTESIS   Final Result   Successful ultrasound guided paracentesis. US GALLBLADDER RUQ   Final Result   Gallbladder wall is thickened. This is a nonspecific finding in the presence   of ascites as it may simply be reactive, rather than secondary to underlying   cholecystitis      Low level echoes are seen within the gallbladder, either stones or sludge. Cirrhosis with moderate ascites         XR CHEST PORTABLE   Final Result   New central venous catheter as above, approach is unclear. CT ABDOMEN PELVIS WO CONTRAST Additional Contrast? None   Final Result   1. Hepatic cirrhosis with associated portal venous hypertension and scattered   moderate intraperitoneal free fluid. 2. Diffuse gallbladder wall thickening suggesting association with chronic   versus acute cholecystitis. 3. Suspected gallbladder lumen dependent radiodense sludge. 4. Mild cardiomegaly. CT CHEST WO CONTRAST   Final Result   1. Hepatic cirrhosis with associated portal venous hypertension and scattered   moderate intraperitoneal free fluid.    2. Diffuse gallbladder decompensated cirrhosis. Ammonia 72>86>76>60, on lactulose and rifaximin, GI on board. Hypernatremia-sodium 152>149, nephrology on board, increase free water to 250 mill every 6hr     Elevated liver functions and jaundice  Appears to be related to alcoholic cirrhosis  Unclear if the patient has any hepatitis history. GI is following    Diffuse gastrointestinal distention likely due to ileus, on abdominal x-ray, NG tube, rectal tube per GI, can give lactulose rectal, repeated KUB 9/18 with moderate persistent diffuse small bowel distention without evident colonic distention,? Distal small bowel obstruction, discussed with critical care medicine, CT abdomen noted, lactic acid one-point 4 GI holding lactulose. Given tap water enema    Acute metabolic acidosis  Appears to be a combination of acute kidney injury possible sepsis lactic acidosis and decompensated cirrhosis. We are working to correct the patient's abnormalities  Nephrology is following as well, had albumin, no urgent RRT   Creatinine 3.4>1.7> 1.9 trending down    Acute kidney injury, worsening creatinine 1.9  Secondary to all of the above, Cr 3.4>2.7>2.3  Nephrology is following as noted above  Continue to monitor renal function  Nephrologist given Lasix and albumin, no urgent need for RRT     Anemia with possible GI bleed  Patient has been started on octreotide  He is on PPI as well  Gastroenterology has been consulted for his cirrhosis as well as for the possible bleed. GI has determined that this is secondary to hemorrhoids General surgery was consulted it would be more dangerous to do a surgery for the hemorrhoid then it would be to just allow it to ooze a little bit.   Banding would also be more dangerous     Thrombocytopenia  Secondary to liver disease    DVT Prophylaxis: SCDs  Diet: Diet NPO  Code Status: Full Code    PT/OT Eval Status: When appropriate    Dispo -patient is critically ill medical decision making is high, continue ICU care, currently ventilated, on 16 mics of Levophed, creatinine worsening, 1.9, nephrology on board, given albumin and Lasix, no urgent need for RRT, abdominal distention, GI repeating KUB.   Lactulose has been on hold for abdominal distention, rectal tube, tapwater enema, had been declined for transfer for liver transplant by     35 minutes of critical care time spent    Joel Marin MD

## 2021-09-20 NOTE — PROGRESS NOTES
Office : [phone]     Fax :[phone]       Nephrology  Progress Note      Patient's Name: Miguelangel Mae  8:57 AM  9/20/2021    Reason for Consult:  CHAO , Hyponatremia     Requesting Physician: Dr. Davidson Flores. Chief Complaint:    Chief Complaint   Patient presents with    Altered Mental Status     pt brought to ed by [facility] after being found by family sitting in chair unresponsive but breahing, bs for squad was 79 1 amp given in route patient moaning at triage          History of Present iIlness:    Miguelangel Mae is a gentleman with unknown age possible history of cirrhosis who apparently presented with family having been found by family poorly responsive who initiated CPR. EMS was called and on arrival to the ER patient was intubated. Post intubation he developed hypotension requiring fluid resuscitation with some improvement. Laboratory work-up was noted for ammonia of 136, lipase of 166, troponin of 0.10, sodium 122, potassium 2.9, CO2 of 18, creatinine of 2.0 and transaminitis with ,   CT chest abdomen pelvis revealed cirrhosis with portal hypertension and diffuse gallbladder thickening concerning for acute versus chronic cholecystitis with gallbladder sludge  CT brain with no acute intracranial pathology  EKG sinus rhythm  Urinalysis negative  History has been gathered from medical records      Latest sodium level is 119. Interval hx       Intubated on vent support   UOP dropped in last 24 hr   Creat 1.1 ---> 1.9   Hypotensive on levophed         I/O last 3 completed shifts: In: 3542.8 [I.V.:3083. 4; NG/GT:160; IV Piggyback:299.4]  Out: 925 [Urine:125; Emesis/NG output:800]    No past medical history on file.     Past Surgical History: Procedure Laterality Date    COLONOSCOPY N/A 9/13/2021    COLONOSCOPY DIAGNOSTIC performed by Gerardo Aponte MD at [address] N/A 9/13/2021    EGD DIAGNOSTIC ONLY performed by Gerardo Aponte MD at [address]       No family history on file. Allergies:  Patient has no known allergies. Current Medications:    lactated ringers infusion, Continuous  propofol injection, Titrated  polyethylene glycol (GLYCOLAX) packet 17 g, Daily  sennosides-docusate sodium (SENOKOT-S) 8.6-50 MG tablet 2 tablet, BID  voriconazole (VFEND) 170 mg in dextrose 5 % 100 mL IVPB, Q12H  dexmedetomidine (PRECEDEX) 400 mcg in sodium chloride 0.9 % 100 mL infusion, Continuous  pantoprazole (PROTONIX) injection 40 mg, BID  levoFLOXacin (LEVAQUIN) 750 MG/150ML infusion 750 mg, Q48H  0.9 % sodium chloride infusion, PRN  sodium chloride flush 0.9 % injection 5-40 mL, 2 times per day  sodium chloride flush 0.9 % injection 5-40 mL, PRN  0.9 % sodium chloride infusion, PRN  ondansetron (ZOFRAN-ODT) disintegrating tablet 4 mg, Q8H PRN   Or  ondansetron (ZOFRAN) injection 4 mg, Q6H PRN  acetaminophen (TYLENOL) tablet 650 mg, Q6H PRN   Or  acetaminophen (TYLENOL) suppository 650 mg, Q6H PRN  ipratropium-albuterol (DUONEB) nebulizer solution 1 ampule, 4x daily  potassium chloride 20 mEq/50 mL IVPB (Central Line), PRN  rifaximin (XIFAXAN) tablet 550 mg, BID  norepinephrine (LEVOPHED) 16 mg in dextrose 5% 250 mL infusion, Continuous  PATIENT HOME MEDS STORED IN PHARMACY!!!!, Prior to discharge  fentaNYL 10 mcg/mL infusion, Continuous        Physical exam:     Vitals:  BP 96/64   Pulse 57   Temp 96.6 °F (35.9 °C) (Axillary)   Resp 16   Ht 5' 5\" (1.651 m)   Wt 186 lb 11.7 oz (84.7 kg)   SpO2 100%   BMI 31.07 kg/m²   Constitutional:  Intubated   Skin: no rash, turgor wnl  Heent:   Icterus +  Neck: no bruits or jvd noted  Cardiovascular:  S1, S2 without m/r/g  Respiratory: CTA B without w/r/r  Abdomen:  +bs, soft, nt, nd  Ext: no  lower extremity edema    Labs:  CBC:   Recent Labs     09/18/21  0438 09/19/21  0509 09/20/21  0745   WBC 14.1* 25.4* 23.7*   HGB 7.3* 8.4* 8.6*   PLT 38* 39* 37*     BMP:    Recent Labs     09/18/21  1345 09/18/21  1345 09/18/21  1815 09/19/21  0509 09/20/21  0508   *  --   --  149* 147*   K 3.1*   < > 3.7 3.5 4.5   *  --   --  119* 116*   CO2 19*  --   --  19* 17*   BUN 70*  --   --  71* 79*   CREATININE 1.2  --   --  1.1 1.9*   GLUCOSE 127*  --   --  120* 104*    < > = values in this interval not displayed. Ca/Mg/Phos:   Recent Labs     09/18/21  0438 09/18/21  0438 09/18/21  1345 09/19/21  0509 09/20/21  0508   CALCIUM 9.7   < > 9.6 9.6 9.5   MG 1.90  --   --  1.90 1.80   PHOS 2.8  --   --  3.7 4.4    < > = values in this interval not displayed. Hepatic:   Recent Labs     09/18/21 0438 09/19/21  0509 09/20/21  0508   * 103* 116*   ALT 97* 96* 91*   BILITOT 25.5* 27.8* 24.5*   ALKPHOS 164* 182* 183*     Troponin:   No results for input(s): TROPONINI in the last 72 hours. BNP: No results for input(s): BNP in the last 72 hours. Lipids: No results for input(s): CHOL, TRIG, HDL, LDLCALC, LABVLDL in the last 72 hours. ABGs:   Recent Labs     09/20/21  0516   PHART 7.259*   PO2ART 115.0*   AOX9CKV 38.5     INR:   Recent Labs     09/18/21  0438 09/19/21  0509 09/20/21  0508   INR 3.04* 2.81* 2.60*       IMAGING:  CT ABDOMEN PELVIS WO CONTRAST Additional Contrast? None   Final Result   1. Compared to 09/15/2021, no significant interval changes appreciated. 2. Liver cirrhosis with mild to moderate volume ascites. Moderate anasarca. 3. Prominent gaseous and fluid distension of the colon, grossly similar as of   09/15/2021.   4. Cholelithiasis and/or sludge in the gallbladder lumen. XR ABDOMEN (KUB) (SINGLE AP VIEW)   Final Result   Moderate persistent diffuse small bowel distension without evident colonic   distension.   Distal small bowel obstruction could be present. RECOMMENDATION:   Continued follow-up with serial films suggested. Repeat CT scan suggested if   patient not improving. XR CHEST PORTABLE   Final Result   Slightly improved aeration in the lower lung zones bilaterally. CT ABDOMEN PELVIS WO CONTRAST Additional Contrast? None   Final Result   1. Diffuse gastrointestinal distension, likely due to ileus   2. Fluid throughout the colon and rectum. Correlate with any history of   diarrhea. No evident colonic wall thickening to indicate colitis   3. Cirrhosis with small ascites   4. Stones versus sludge in the gallbladder   5. Atelectasis or pneumonia in the lung bases with small left pleural effusion         XR CHEST PORTABLE   Final Result   Low lung volumes with bibasilar effusions. Support tubes as described above. XR CHEST PORTABLE   Final Result   Bibasilar hypoaeration again noted with persistent left basilar opacity   suggesting atelectasis         IR US GUIDED PARACENTESIS   Final Result   Successful ultrasound guided paracentesis. US GALLBLADDER RUQ   Final Result   Gallbladder wall is thickened. This is a nonspecific finding in the presence   of ascites as it may simply be reactive, rather than secondary to underlying   cholecystitis      Low level echoes are seen within the gallbladder, either stones or sludge. Cirrhosis with moderate ascites         XR CHEST PORTABLE   Final Result   New central venous catheter as above, approach is unclear. CT ABDOMEN PELVIS WO CONTRAST Additional Contrast? None   Final Result   1. Hepatic cirrhosis with associated portal venous hypertension and scattered   moderate intraperitoneal free fluid. 2. Diffuse gallbladder wall thickening suggesting association with chronic   versus acute cholecystitis. 3. Suspected gallbladder lumen dependent radiodense sludge. 4. Mild cardiomegaly. CT CHEST WO CONTRAST   Final Result   1. Hepatic cirrhosis with associated portal venous hypertension and scattered   moderate intraperitoneal free fluid. 2. Diffuse gallbladder wall thickening suggesting association with chronic   versus acute cholecystitis. 3. Suspected gallbladder lumen dependent radiodense sludge. 4. Mild cardiomegaly. CT Head WO Contrast   Final Result   No acute intracranial abnormality. XR CHEST PORTABLE   Final Result   Endotracheal tube and orogastric tube are acceptable. Bibasilar opacities are again noted. XR CHEST PORTABLE   Final Result   Reticulation at the left base more than right is nonspecific. Could relate   to subsegmental atelectasis, aspiration pneumonitis, or early pneumonia. Assessment/Plan :      1. CHAO 2/2 ATN from hypotension / shock. Anemia   Renal function worsened   Has ATN 2/2 hypotension   Will give albumin and lasix     No emergent need for RRT but if renal function worsen then may need. Prognosis poor because of liver failure     Recommend to dose adjust all medications  based on renal functions  Maintain SBP> 90 mmHg   Daily weights   AVOID NSAIDs  Avoid Nephrotoxins  Monitor Intake/Output  Call if significant decrease in urine output     2. Now developed hypernatremia   Added free water flushes   Improved. Continue to monitor. 3.  Hypotension/ shock state   On levophed   Give albumin as needed     4. High anion gap acidosis. Resolved. 5. Hypokalemia - replace iv today     6. Hepatic failure/Encephalopathy   GI following.            Recommend to dose adjust all medications  based on renal functions  Maintain SBP> 90 mmHg   Daily weights   AVOID NSAIDs  Avoid Nephrotoxins  Monitor Intake/Output  Call if significant decrease in urine output               Thank you for allowing us to participate in care of Thelma Connolly         Electronically signed by: Manuel Uriarte MD, 9/20/2021, 8:57 AM      [facility] [place]  Office : [phone]  Fax :[phone]

## 2021-09-20 NOTE — PROGRESS NOTES
09/18/21  1345 09/18/21  1815 09/19/21  0509 09/20/21  0508   *  --   --  149* 147*   K 3.1*   < > 3.7 3.5 4.5   *  --   --  119* 116*   CO2 19*  --   --  19* 17*   BUN 70*  --   --  71* 79*   CREATININE 1.2  --   --  1.1 1.9*   CALCIUM 9.6  --   --  9.6 9.5   GLUCOSE 127*  --   --  120* 104*    < > = values in this interval not displayed. ABG:  Recent Labs     09/18/21  0438 09/19/21  0514 09/20/21  0516   PHART 7.364 7.324* 7.259*   OEW2ZOG 35.0 36.0 38.5   PO2ART 96.0 123.0* 115.0*   SHD9VRS 19.9* 18.7* 17.3*   Z5RLGFUC 98.1 99.3 98.9   BEART -5.0* -6.7* -9.1*     Procalcitonin  No results for input(s): PROCAL in the last 72 hours. Radiology Review:  Pertinent images / reports were reviewed as a part of this visit. Assessment:     1. Acute hypoxemic respiratory failure  2. Alcoholic cirrhosis  3. GI bleed, hemorrhoids and nonbleeding esophageal varices  4. Stenotrophomonas infection  5. Respiratory fungal infection    Plan:     1. I have reviewed laboratories, medical records and images for this visit  2. Patient is an active alcoholic who presents with acute liver failure and multisystem involvement with acute kidney injury and respiratory failure as well  3. Now growing stenotrophomonas and mold from his respiratory sputum  4. On voriconazole pending ID consultation  5.  liver transplant service has said that he is not a transplant candidate. 6. Continues to require dexmedetomidine, propofol and fentanyl for comfort  7. Requiring norepinephrine in support of hypotension. 8. I switched him to spontaneous ventilation  9. If he tolerates this we will try to decrease his sedation to see if he may tolerate liberation from mechanical ventilation  10. Long-term prognosis, however, is poor  11. Were trying to get a hold of his wife who still resides in [place]. Would like to involve her in decision making.     Total critical care time caring for this patient with life threatening illness, including direct patient contact, management of life support systems, review of data including imaging and labs, discussions with other team members and physicians is at least 32 minutes so far today, excluding procedures.

## 2021-09-20 NOTE — PLAN OF CARE
Problem: Daily Care:  Goal: Daily care needs are met  Description: Daily care needs are met  Outcome: Ongoing    Problem: Pain:  Goal: Patient's pain/discomfort is manageable  Description: Patient's pain/discomfort is manageable  Outcome: Ongoing  Goal: Pain level will decrease  Description: Pain level will decrease  Outcome: Ongoing  Goal: Control of acute pain  Description: Control of acute pain  Outcome: Ongoing

## 2021-09-20 NOTE — PROGRESS NOTES
Comprehensive Nutrition Assessment    Type and Reason for Visit:  Reassess    Nutrition Recommendations/Plan:   1. Pt now with inadequate nutrition x 10 days during admission. 2. Recommend to consider TPN to start. 3. Recommend Clinimix 5/20 to start at 40 mL per hour and advance as tolerated until goal rate 83 mL per hour is achieved. 4. Clinimix 5/20 at 83 mL per hour to provide 2000 mL total volume, 1760 calories, 100 grams protein, dextrose load 3.28 mg/kg/min. 5. Physician/LIP to monitor closely and correct lytes (Phos,Mg,K+) d/t risk of refeeding syndrome  6. Recommend FSBS, monitor glucose, need for insulin  Pharmacy to adjust MVI and Trace Elements as needed     Nutrition Assessment:  Pt remains on mechanical ventilation. Propofol at 7.2 mL per hour to provide 190 calories from fat daily. Levophed at 24 mcg/min. EN has remained on hold d/t ileus and abdominal distension. Pt still with no BM. Pt now with inadequate nutrition x 10 days during admission. Recommend TPN to start if continued aggressive care is desired. See above for recommendations. Malnutrition Assessment:  Malnutrition Status: Moderate malnutrition    Context:  Acute Illness     Findings of the 6 clinical characteristics of malnutrition:  Energy Intake:  7 - 50% or less of estimated energy requirements for 5 or more days  Weight Loss:  No significant weight loss     Body Fat Loss:  No significant body fat loss     Muscle Mass Loss:  No significant muscle mass loss    Fluid Accumulation:  7 - Moderate to Severe Extremities   Strength:  Not Performed    Estimated Daily Nutrient Needs:  Energy (kcal):  1875 - 2250; Weight Used for Energy Requirements:  Admission     Protein (g):  90 - 150; Weight Used for Protein Requirements:  Admission (75 kg; 1.2-2 grams per kg)        Fluid (ml/day):   ; Method Used for Fluid Requirements:  1 ml/kcal      Nutrition Related Findings:  Na+ 147. NG to LIS. Non-pitting generalized edema.  +1 non-pitting BUE edema. +2 pitting BLE edema. +11.8 liters. Wounds:  Deep Tissue Injury       Current Nutrition Therapies:    Diet NPO    Anthropometric Measures:  · Height: 5' 5\" (165.1 cm)  · Current Body Weight: 186 lb 11.7 oz (84.7 kg)   · Admission Body Weight: 164 lb 10.9 oz (74.7 kg)    · Ideal Body Weight: 136 lbs; % Ideal Body Weight 137.3 %   · BMI: 31.1  · BMI Categories: Obese Class 1 (BMI 30.0-34. 9)       Nutrition Diagnosis:   · Inadequate protein-energy intake related to altered GI function as evidenced by NPO or clear liquid status due to medical condition      Nutrition Interventions:   Food and/or Nutrient Delivery:  Continue NPO, Start Parenteral Nutrition  Nutrition Education/Counseling:  Education not indicated   Coordination of Nutrition Care:  Continue to monitor while inpatient    Goals:  start of nutrition within next 24 - 48 hr       Nutrition Monitoring and Evaluation:   Behavioral-Environmental Outcomes:  None Identified   Food/Nutrient Intake Outcomes:  None Identified  Physical Signs/Symptoms Outcomes:  Biochemical Data, GI Status, Weight, Skin, Hemodynamic Status     Discharge Planning:     Too soon to determine     Electronically signed by Siva Quiroga RD, LD on 9/20/21 at 2:45 PM EDT    Contact: [phone]

## 2021-09-20 NOTE — PLAN OF CARE
Problem: Non-Violent Restraints  Goal: Removal from restraints as soon as assessed to be safe  Outcome: Ongoing  Goal: No harm/injury to patient while restraints in use  Outcome: Ongoing  Goal: Patient's dignity will be maintained  Outcome: Ongoing     Problem: Skin Integrity:  Goal: Will show no infection signs and symptoms  Description: Will show no infection signs and symptoms  Outcome: Ongoing  Goal: Absence of new skin breakdown  Description: Absence of new skin breakdown  Outcome: Ongoing     Problem: Safety:  Goal: Free from accidental physical injury  Description: Free from accidental physical injury  Outcome: Ongoing  Goal: Free from intentional harm  Description: Free from intentional harm  Outcome: Ongoing     Problem: Skin Integrity:  Goal: Skin integrity will stabilize  Description: Skin integrity will stabilize  Outcome: Ongoing

## 2021-09-20 NOTE — PROGRESS NOTES
Gastroenterology Progress Note            Teri Burks is a 55 y.o. male patient. 1. Hepatic encephalopathy ([address]    2. Gastrointestinal hemorrhage, unspecified gastrointestinal hemorrhage type    3. Thickening of wall of gallbladder    4. Liver failure with hepatic coma, unspecified chronicity (HCC)        SUBJECTIVE:  Pt intubated, sedated, critically ill. Physical    VITALS:  /68   Pulse 57   Temp 94.3 °F (34.6 °C) (Rectal)   Resp 16   Ht 5' 5\" (1.651 m)   Wt 186 lb 11.7 oz (84.7 kg)   SpO2 100%   BMI 31.07 kg/m²   TEMPERATURE:  Current - Temp: 94.3 °F (34.6 °C); Max - Temp  Av °F (35.6 °C)  Min: 94.3 °F (34.6 °C)  Max: 96.7 °F (35.9 °C)    Abdomen soft, ND , no HSM, Bowel sounds hypoactive  Intubated sedated. Trace edema. Data      Recent Labs     21  05021  0745   WBC 14.1* 25.4* 23.7*   HGB 7.3* 8.4* 8.6*   HCT 21.3* 24.7* 25.5*   MCV 94.2 93.9 94.5   PLT 38* 39* 37*     Recent Labs     21  1345 21  1345 21  1815 21  0509 21  0508   *   < > 154*  --   --  149* 147*   K 3.5  --  3.1*   < > 3.7 3.5 4.5   *   < > 122*  --   --  119* 116*   CO2 20*   < > 19*  --   --  19* 17*   PHOS 2.8  --   --   --   --  3.7 4.4   BUN 70*   < > 70*  --   --  71* 79*   CREATININE 1.7*   < > 1.2  --   --  1.1 1.9*    < > = values in this interval not displayed. Recent Labs     21  0509 21  0508   * 103* 116*   ALT 97* 96* 91*   BILITOT 25.5* 27.8* 24.5*   ALKPHOS 164* 182* 183*     No results for input(s): LIPASE, AMYLASE in the last 72 hours. ASSESSMENT   1. Alcoholic cirrhosis-  UC declined to eval for transplant. 2. Hepatic encephalopathy- ammonia improved. Pt on xifaxan. Lactulose held d/t ileus/abdominal distention. 3. Ascites-  SAAG c/w portal hypertension. Neg for sbp or neutrocytic ascites. 4. Leukocytosis- increased today. 5. CHAO - cr worsened today. 6. Hypernatremia-  7. Hematochezia from hemorrhoid  8. Anemia-  hgb stable now in the 8's   9. Ileus. Ab distention. Decreased bowel sounds today. 10. Respiratory failure. -  On vent. Has fungal infection in lungs also. PLAN    1. Cont Abx and antifungals  2. Vent and pressor support  3. Follow hgb  4. Cont xifaxan. 5. Check AXR today    Discussed with Dr. Roz Garcia PA-C  [facility]    I have personally performed a face to face diagnostic evaluation on this patient. I have interviewed and examined the patient and I agree with the findings and recommended plan of care. In summary, my findings and plan are the following:  abd still distended. Less bowel sounds today. Still intubated, Cr. Worsening again. Wbc back up. Plan check ab xray. Maintain electrolytes, frequent turning. Hold lactulose due to colonic distention.         Vester Dakin, MD  [address]

## 2021-09-20 NOTE — PROGRESS NOTES
09/20/21 1944   Vent Patient Data   Plateau Pressure 16 LRT18   Static Compliance 44 mL/cmH2O   Dynamic Compliance 66.25 mL/cmH2O

## 2021-09-20 NOTE — PROGRESS NOTES
No phone number in chart for wife. Brother Yuki Balderas contacted about plan of care. Family meeting scheduled for 11 am tomorrow morning.

## 2021-09-20 NOTE — PLAN OF CARE
Nutrition Problem #1: Inadequate protein-energy intake  Intervention: Food and/or Nutrient Delivery: Continue NPO, Start Parenteral Nutrition  Nutritional Goals: start of nutrition within next 24 - 48 hr

## 2021-09-20 NOTE — CONSULTS
1921 and CT scan of abdomen and pelvis done on that date as well as on 9/15/2021 and 9/18/21. The patient has liver cirrhosis changes. He also has gallbladder sludge. There were no signs of a more than pneumonia like lung nodules are cavitated lesions on CT scan of the chest    Acute viral hepatitis panel was negative    Last EKG was done on 9/9/2021. QTc interval was 483 ms    Plan:     Diagnostic Workup:    · Will order 2 sets of blood cultures for thoroughness  · Add procalcitonin to morning labs  · Will order nasal MRSA probe  · We will order fungal serology by immunodiffusion and complement fixation and serum Aspergillus galactomannan for thoroughness  · We will order baseline HIV screen  · Continue to follow fever curve, WBC count and blood cultures  · Follow up on liverand renal functions closely  · We will order twelve-lead EKG to assess QTc interval  · The patient had significant respiratory secretions. Send a tracheal aspirate culture    Antimicrobials:    · Agree with IV levofloxacin. Continue IV levofloxacin 750 mg every 48 hours for stenotrophomonas pneumonia coverage  · Mold in the tracheal aspirate culture is likely innocent bystander. Low suspicion for more than pneumonia. As the patient is on levofloxacin, I think the risks of QT prolongation by combining with voriconazole exceeds the benefits of continuing it in setting of low suspicion for a more pneumonia. I will stop voriconazole today  · Due to presence of gallbladder sludge and ongoing leukocytosis and concern for possible gallbladder inflammation, I will add IV Flagyl 5 mg every 8 hour for empiric anaerobic coverage  · Okay to continue rifaximin for hepatic encephalopathy prevention in setting of hyperammonemia  · Start oral probiotics and low-dose oral vancomycin 250 mg every 12 hour for C. difficile prevention.   · Endotracheal tube care and pulmonary toileting  · Ventilator support to maintain oxygen saturation above 92%  · Vasopressor support to maintain mean arterial pressure greater than 65 mmHg  · We will follow up on the culture results and clinical progress and will make further recommendations accordingly. · Continue close vitals monitoring. · Maintain good glycemic control. · Fall precautions. Pressure ulcer prevention precautions. · Continue to watch for new fever or diarrhea. · DVT prophylaxis. · Critically ill patient. High risk of morbidity and mortality  · Overall prognosis guarded in setting of severe cirrhosis  · Continue close monitoring in ICU setting      Drug Monitoring:    · Continue serial monitoring for antibiotic toxicity as follows: CBC, CMP, QTc interval  · Continue to watch for following: new or worsening fever, hypotension, hives, lip swelling and redness or purulence at vascular access sites. I/v access Management:    · Continue to monitor i.v access sites for erythema, induration, discharge or tenderness. · As always, continue efforts to minimizetubes/lines/drains as clinically appropriate to reduce chances of line associated infections. Risk of Complications/Morbidity/Mortality: High     · Patient is critically ill and has a potentially life threatening infection that poses threat to life/bodily function. · There is potential for worsening infection/ sudden clinically significant or life-threatening deterioration in the patient's condition without appropriate antimicrobial therapy. · Complex medical decision making process was involved to select appropriate antimicrobial agents to reverse the cause of patient's severe infection/ illness. · Antimicrobial therapy requires intensive monitoring for toxicity and frequent dose adjustments to prevent toxicity and permanent end-organ dysfunction. Critical care time:  33 minutes      Thank you for involving me in the care of your patient. I will continue to follow.  If you have any additional questions, please do not hesitate to contact me. Subjective:     Presenting complaint in ER:     Chief Complaint   Patient presents with    Altered Mental Status     pt brought to ed by [facility] after being found by family sitting in chair unresponsive but jill bs for squad was 79 1 amp given in route patient moaning at triage         HPI: Brittany Lovell is a 55 y.o. male patient, who was seen at the request of Dr. Leila Kaiser MD.    History was obtained from chart review as the patient is intubated and sedated and is on a ventilator    The patient was admitted on 9/9/2021. I have been consulted to see the patient for above mentioned reason(s). The patient has multiple medical comorbidities, and presented to the ER originally for altered mental status. He was found unresponsive by his family. The family was reportedly doing the CPR but when EMS arrived, the patient had a pulse. The patient was hypotensive on presentation to the ER and was admitted to the ICU. He was thought to have alcoholic liver disease with hepatic encephalopathy. He was intubated for airway protection. In the ER, he had elevated white cell count of 42,900. He was admitted. Blood cultures were sent. He was given IV vancomycin and cefepime in the ER and was subsequently started on IV Zosyn. IV Zosyn was switched by primary team to levofloxacin 750 mg every other day on 9/14/2021. He has remained intubated. His tracheal aspirate culture on 9/13/2021 came back positive for mold and heavy growth of stenotrophomonas. He was started on empiric voriconazole by primary team on 9/16/2021. The patient also spiked a high fever of 103.6 on the morning of 9/18/2021 that subsided on its own. He has continued hypotension and leukocytosis    I have been asked for my opinion for management for this patient. Past Medical History: All past medical history reviewed today. No past medical history on file. Past Surgical History:  All pastsurgical history was reviewed today. Past Surgical History:   Procedure Laterality Date    COLONOSCOPY N/A 9/13/2021    COLONOSCOPY DIAGNOSTIC performed by Ira Brambila MD at [facility] N/A 9/13/2021    EGD DIAGNOSTIC ONLY performed by Ira Brambila MD at [address]         Family History: All family history was reviewed today. No family history on file. Medications: All current and past medications were reviewed. No medications prior to admission.  albumin human  25 g IntraVENous Q6H    furosemide  20 mg IntraVENous BID    polyethylene glycol  17 g Oral Daily    sennosides-docusate sodium  2 tablet Oral BID    pantoprazole  40 mg IntraVENous BID    levofloxacin  750 mg IntraVENous Q48H    sodium chloride flush  5-40 mL IntraVENous 2 times per day    ipratropium-albuterol  1 ampule Inhalation 4x daily    rifaximin  550 mg Per NG tube BID    Empty Capsule #00 Purple/White  1 each Does not apply Prior to discharge          REVIEW OF SYSTEMS:       Review of Systems   Unable to perform ROS: Intubated          Objective:       PHYSICAL EXAM:      Vitals:   Vitals:    09/20/21 0700 09/20/21 0800 09/20/21 0825 09/20/21 1000   BP:  104/68  (!) 92/58   Pulse: 57 57  65   Resp: 14 14 16 16   Temp:  94.3 °F (34.6 °C)     TempSrc:  Rectal     SpO2: 100% 100% 100% 100%   Weight:       Height:           Physical Exam     General: intubated and sedated, on ventilator, hypotensive, requiring vasopressors  HEENT: normocephalic, atraumatic, sclera clear, pupils equal, light reflex preserved bilaterally, endotracheal tube in place  Cardiovascular: RRR, no murmurs/rubs/gallops detected  Pulmonary: Bilateral lower lobe crackles  Abdomen/GI: Distended, bowel sounds positive but diminished.   Neuro: sedated, PERRL, moves all extremities when off sedation per RN  Skin: no skin tears or ulcers, no rash, significantly jaundiced  Musculoskeletal:  No joint swelling, redness. No limitation of range of passive motion  Genitourinary: Peterson's catheter in place  Psych: could not assess  Lymphatic/Immunologic: No obvious bruising, no cervical lymphadenopathy    Lines: All vascular access sites are healthy with no local erythema, discharge or tenderness      Intake and output:     I/O last 3 completed shifts: In: 3542.8 [I.V.:3083. 4; NG/GT:160; IV Piggyback:299.4]  Out: 925 [Urine:125; Emesis/NG output:800]    Lab Data:   All available labs were reviewed by me today. CBC:   Recent Labs     09/18/21  0438 09/19/21  0509 09/20/21  0745   WBC 14.1* 25.4* 23.7*   RBC 2.26* 2.63* 2.70*   HGB 7.3* 8.4* 8.6*   HCT 21.3* 24.7* 25.5*   PLT 38* 39* 37*   MCV 94.2 93.9 94.5   MCH 32.3 32.1 31.8   MCHC 34.2 34.2 33.7   RDW 18.6* 19.4* 19.9*        BMP:  Recent Labs     09/18/21  1345 09/18/21  1345 09/18/21  1815 09/19/21  0509 09/20/21  0508   *  --   --  149* 147*   K 3.1*   < > 3.7 3.5 4.5   *  --   --  119* 116*   CO2 19*  --   --  19* 17*   BUN 70*  --   --  71* 79*   CREATININE 1.2  --   --  1.1 1.9*   CALCIUM 9.6  --   --  9.6 9.5   GLUCOSE 127*  --   --  120* 104*    < > = values in this interval not displayed. Hepatic FunctionPanel:   Lab Results   Component Value Date    ALKPHOS 183 09/20/2021    ALT 91 09/20/2021     09/20/2021    PROT 4.2 09/20/2021    BILITOT 24.5 09/20/2021    LABALBU 2.6 09/20/2021       CPK:   Lab Results   Component Value Date    CKTOTAL 28 (L) 09/20/2021     ESR: No results found for: SEDRATE  CRP: No results found for: CRP      Imaging: All pertinent images and reports for the current visit were reviewed by meduring this visit. CT ABDOMEN PELVIS WO CONTRAST Additional Contrast? None   Final Result   1. Compared to 09/15/2021, no significant interval changes appreciated. 2. Liver cirrhosis with mild to moderate volume ascites. Moderate anasarca.    3. Prominent gaseous and fluid distension of the colon, grossly similar as of   09/15/2021.   4. Cholelithiasis and/or sludge in the gallbladder lumen. XR ABDOMEN (KUB) (SINGLE AP VIEW)   Final Result   Moderate persistent diffuse small bowel distension without evident colonic   distension. Distal small bowel obstruction could be present. RECOMMENDATION:   Continued follow-up with serial films suggested. Repeat CT scan suggested if   patient not improving. XR CHEST PORTABLE   Final Result   Slightly improved aeration in the lower lung zones bilaterally. CT ABDOMEN PELVIS WO CONTRAST Additional Contrast? None   Final Result   1. Diffuse gastrointestinal distension, likely due to ileus   2. Fluid throughout the colon and rectum. Correlate with any history of   diarrhea. No evident colonic wall thickening to indicate colitis   3. Cirrhosis with small ascites   4. Stones versus sludge in the gallbladder   5. Atelectasis or pneumonia in the lung bases with small left pleural effusion         XR CHEST PORTABLE   Final Result   Low lung volumes with bibasilar effusions. Support tubes as described above. XR CHEST PORTABLE   Final Result   Bibasilar hypoaeration again noted with persistent left basilar opacity   suggesting atelectasis         IR US GUIDED PARACENTESIS   Final Result   Successful ultrasound guided paracentesis. US GALLBLADDER RUQ   Final Result   Gallbladder wall is thickened. This is a nonspecific finding in the presence   of ascites as it may simply be reactive, rather than secondary to underlying   cholecystitis      Low level echoes are seen within the gallbladder, either stones or sludge. Cirrhosis with moderate ascites         XR CHEST PORTABLE   Final Result   New central venous catheter as above, approach is unclear. CT ABDOMEN PELVIS WO CONTRAST Additional Contrast? None   Final Result   1.  Hepatic cirrhosis with associated portal venous hypertension and scattered   moderate intraperitoneal free fluid. 2. Diffuse gallbladder wall thickening suggesting association with chronic   versus acute cholecystitis. 3. Suspected gallbladder lumen dependent radiodense sludge. 4. Mild cardiomegaly. CT CHEST WO CONTRAST   Final Result   1. Hepatic cirrhosis with associated portal venous hypertension and scattered   moderate intraperitoneal free fluid. 2. Diffuse gallbladder wall thickening suggesting association with chronic   versus acute cholecystitis. 3. Suspected gallbladder lumen dependent radiodense sludge. 4. Mild cardiomegaly. CT Head WO Contrast   Final Result   No acute intracranial abnormality. XR CHEST PORTABLE   Final Result   Endotracheal tube and orogastric tube are acceptable. Bibasilar opacities are again noted. XR CHEST PORTABLE   Final Result   Reticulation at the left base more than right is nonspecific. Could relate   to subsegmental atelectasis, aspiration pneumonitis, or early pneumonia. XR ABDOMEN (2 VIEWS)    (Results Pending)   XR CHEST PORTABLE    (Results Pending)       Outside records:    Labs, Microbiology, Radiology and pertinent results from Care everywhere, if available, were reviewed as a part ofthe consultation.       Problem list:       Patient Active Problem List   Diagnosis Code    Hepatic encephalopathy ([address] K72.90    Hyperammonemia ([facility] [address] E72.20    Elevated lactic acid level R40.60    Metabolic acidosis, increased anion gap (IAG) E87.2    Elevated troponin R77.8    Hypokalemia E87.6    Hyponatremia E87.1    Hypotension I95.9    Anemia D64.9    GI bleed K92.2    Transaminitis R74.01    Thickening of wall of gallbladder K82.8    Liver failure with hepatic coma (HCC) K72.91    Septic shock (HCC) A41.9, R65.21    Infection due to Stenotrophomonas maltophilia A49.8    On mechanically assisted ventilation (HCC) Z99.11    Other ascites R18.8    Gram-negative pneumonia ([facility]) R54.2    Alcoholic cirrhosis of liver with ascites (HCC) K70.31    Anasarca R60.1    Gallbladder sludge K82.8    Coagulopathy (HCC) D68.9    Screening for HIV (human immunodeficiency virus) Z11.4    Alcohol abuse F10.10         Please note that this chart was generated using Dragon dictation software. Although every effort was made to ensure the accuracy of this automated transcription, some errors in transcription may have occurred inadvertently. If you may need any clarification, please do not hesitate to contact me through EPIC or at the phone number provided below with my electronic signature. Any pictures or media included in this note were obtained after taking informed verbal consent from the patient and with their approval to include those in the patient's medical record.       He Sanchez MD, MPH  9/20/21, 11:48 AM EDT   [facility]   [address] [place] [address], [address] [place] [address]  Office: [phone]  Fax: [phone]  Clinic days:  Tuesday & Thursday

## 2021-09-21 NOTE — PLAN OF CARE
Problem: Non-Violent Restraints  Goal: Removal from restraints as soon as assessed to be safe  Outcome: Ongoing  Goal: No harm/injury to patient while restraints in use  Outcome: Ongoing  Goal: Patient's dignity will be maintained  Outcome: Ongoing     Problem: Nutrition  Goal: Optimal nutrition therapy  Outcome: Ongoing     Problem: Skin Integrity:  Goal: Will show no infection signs and symptoms  Description: Will show no infection signs and symptoms  Outcome: Ongoing  Goal: Absence of new skin breakdown  Description: Absence of new skin breakdown  Outcome: Ongoing     Problem: Falls - Risk of:  Goal: Will remain free from falls  Description: Will remain free from falls  Outcome: Ongoing  Goal: Absence of physical injury  Description: Absence of physical injury  Outcome: Ongoing     Problem: Infection:  Goal: Will remain free from infection  Description: Will remain free from infection  Outcome: Ongoing     Problem: Safety:  Goal: Free from accidental physical injury  Description: Free from accidental physical injury  Outcome: Ongoing  Goal: Free from intentional harm  Description: Free from intentional harm  Outcome: Ongoing     Problem: Daily Care:  Goal: Daily care needs are met  Description: Daily care needs are met  Outcome: Ongoing     Problem: Pain:  Goal: Patient's pain/discomfort is manageable  Description: Patient's pain/discomfort is manageable  Outcome: Ongoing  Goal: Pain level will decrease  Description: Pain level will decrease  Outcome: Ongoing  Goal: Control of acute pain  Description: Control of acute pain  Outcome: Ongoing  Goal: Control of chronic pain  Description: Control of chronic pain  Outcome: Ongoing     Problem: Skin Integrity:  Goal: Skin integrity will stabilize  Description: Skin integrity will stabilize  Outcome: Ongoing     Problem: Discharge Planning:  Goal: Patients continuum of care needs are met  Description: Patients continuum of care needs are met  Outcome: Ongoing

## 2021-09-21 NOTE — PROGRESS NOTES
[facility]    Follow Up Note    Subjective:   CHIEF COMPLAINT / HPI:   Chief Complaint   Patient presents with    Altered Mental Status     pt brought to ed by [facility] after being found by family sitting in chair unresponsive but jill, bs for squad was 79 1 amp given in route patient moaning at triage        Interval history: Patient remains intubated on mechanical ventilation. Becomes quite agitated when sedation is decreased. His family gathered at the bedside. By phone, family in [place] was also present. The visit was conducted with the assistance of a qualified . Past Medical History:    Reviewed; no changes    Social History:    Reviewed; no changes    REVIEW OF SYSTEMS:    CONSTITUTIONAL:  negative for fevers and chills  RESPIRATORY:  See HPI  CARDIOVASCULAR:  negative for chest pain, palpitations, edema  GASTROINTESTINAL:  negative for nausea, vomiting, diarrhea, constipation and abdominal pain    Objective:   PHYSICAL EXAM:        VITALS:  BP (!) 93/53   Pulse 90   Temp 99.7 °F (37.6 °C) (Axillary)   Resp 15   Ht 5' 5\" (1.651 m)   Wt 190 lb 11.2 oz (86.5 kg)   SpO2 92%   BMI 31.73 kg/m²  on mechanical ventilation     24HR INTAKE/OUTPUT:      Intake/Output Summary (Last 24 hours) at 9/21/2021 1538  Last data filed at 9/21/2021 1334  Gross per 24 hour   Intake 2325.4 ml   Output 50 ml   Net 2275.4 ml       CONSTITUTIONAL: Sedated on mechanical ventilation  LUNGS:  No increased work of breathing and clear to auscultation, no crackles or wheezes  CARDIOVASCULAR: S1 and S2 and no JVD  ABDOMEN: Distended with ascites  EXT: No edema, no calf tenderness. Pulses are present bilaterally.   NEUROLOGIC: Sedated on the mechanical ventilation  SKIN: Jaundiced    DATA:    CBC:  Recent Labs     09/19/21  0509 09/20/21  0745   WBC 25.4* 23.7*   RBC 2.63* 2.70*   HGB 8.4* 8.6*   HCT 24.7* 25.5*   PLT 39* 37*   MCV 93.9 94.5   MCH 32.1 31.8   MCHC 34.2 33.7 procedures.

## 2021-09-21 NOTE — PROGRESS NOTES
Office : [phone]     Fax :[phone]       Nephrology  Progress Note      Patient's Name: Thelma Connolly  10:02 AM  9/21/2021        Chief Complaint:    Chief Complaint   Patient presents with    Altered Mental Status     pt brought to ed by [facility] after being found by family sitting in chair unresponsive but breahing, bs for squad was 79 1 amp given in route patient moaning at triage          History of Present iIlness:    Thelma Connolly is a gentleman with unknown age possible history of cirrhosis who apparently presented with family having been found by family poorly responsive who initiated CPR. EMS was called and on arrival to the ER patient was intubated. Post intubation he developed hypotension requiring fluid resuscitation with some improvement. Laboratory work-up was noted for ammonia of 136, lipase of 166, troponin of 0.10, sodium 122, potassium 2.9, CO2 of 18, creatinine of 2.0 and transaminitis with ,   CT chest abdomen pelvis revealed cirrhosis with portal hypertension and diffuse gallbladder thickening concerning for acute versus chronic cholecystitis with gallbladder sludge  CT brain with no acute intracranial pathology  EKG sinus rhythm  Urinalysis negative  History has been gathered from medical records  Latest sodium level is 119. Interval hx       Intubated on vent support   UOP dropped in last 24 hr   Creat 1.1 ---> 1.9 ---> 2.8   Worsening renal function     Hypotensive on levophed and added on vasopressin         I/O last 3 completed shifts: In: 2303.9 [I.V.:1278.7; NG/GT:400; IV Piggyback:625.2]  Out: 300 [Urine:50; Emesis/NG output:150; Stool:100]    No past medical history on file.     Past Surgical History:   Procedure Laterality Date    COLONOSCOPY N/A 9/13/2021    COLONOSCOPY DIAGNOSTIC performed by Orin García MD at [facility] N/A 9/13/2021    EGD DIAGNOSTIC ONLY performed by Orin García MD at [address]       No family history on file. Allergies:  Patient has no known allergies.     Current Medications:    dextrose 50 % IV solution, PRN  furosemide (LASIX) injection 20 mg, BID  vancomycin (FIRVANQ) 50 MG/ML oral solution 250 mg, 2 times per day  metronidazole (FLAGYL) 500 mg in NaCl 100 mL IVPB premix, Q8H  lactobacillus (CULTURELLE) capsule 1 capsule, BID WC  vasopressin 20 Units in dextrose 5 % 100 mL infusion, Continuous  propofol injection, Titrated  polyethylene glycol (GLYCOLAX) packet 17 g, Daily  sennosides-docusate sodium (SENOKOT-S) 8.6-50 MG tablet 2 tablet, BID  dexmedetomidine (PRECEDEX) 400 mcg in sodium chloride 0.9 % 100 mL infusion, Continuous  pantoprazole (PROTONIX) injection 40 mg, BID  levoFLOXacin (LEVAQUIN) 750 MG/150ML infusion 750 mg, Q48H  0.9 % sodium chloride infusion, PRN  sodium chloride flush 0.9 % injection 5-40 mL, 2 times per day  sodium chloride flush 0.9 % injection 5-40 mL, PRN  0.9 % sodium chloride infusion, PRN  ondansetron (ZOFRAN-ODT) disintegrating tablet 4 mg, Q8H PRN   Or  ondansetron (ZOFRAN) injection 4 mg, Q6H PRN  acetaminophen (TYLENOL) tablet 650 mg, Q6H PRN   Or  acetaminophen (TYLENOL) suppository 650 mg, Q6H PRN  ipratropium-albuterol (DUONEB) nebulizer solution 1 ampule, 4x daily  potassium chloride 20 mEq/50 mL IVPB (Central Line), PRN  rifaximin (XIFAXAN) tablet 550 mg, BID  norepinephrine (LEVOPHED) 16 mg in dextrose 5% 250 mL infusion, Continuous  PATIENT HOME MEDS STORED IN PHARMACY!!!!, Prior to discharge  fentaNYL 10 mcg/mL infusion, Continuous        Physical exam:     Vitals:  BP (!) 101/46   Pulse 91   Temp 99.8 °F (37.7 °C) (Axillary)   Resp 14   Ht 5' 5\" (1.651 m)   Wt 190 lb 11.2 oz (86.5 kg)   SpO2 96%   BMI 31.73 kg/m²   Constitutional:  Intubated   Skin: no rash, turgor wnl  Heent: Icterus +  Neck: no bruits or jvd noted  Cardiovascular:  S1, S2 without m/r/g  Respiratory: CTA B without w/r/r  Abdomen:  +bs, soft, nt, distended   Ext: no  lower extremity edema    Labs:  CBC:   Recent Labs     09/19/21  0509 09/20/21  0745   WBC 25.4* 23.7*   HGB 8.4* 8.6*   PLT 39* 37*     BMP:    Recent Labs     09/19/21  0509 09/20/21  0508 09/21/21  0445   * 147* 142   K 3.5 4.5 4.5   * 116* 111*   CO2 19* 17* 16*   BUN 71* 79* 87*   CREATININE 1.1 1.9* 2.8*   GLUCOSE 120* 104* 58*     Ca/Mg/Phos:   Recent Labs     09/19/21  0509 09/20/21  0508 09/21/21  0445   CALCIUM 9.6 9.5 9.3   MG 1.90 1.80 1.80   PHOS 3.7 4.4 4.8     Hepatic:   Recent Labs     09/19/21  0509 09/20/21  0508 09/21/21  0445   * 116* 176*   ALT 96* 91* 93*   BILITOT 27.8* 24.5* 25.3*   ALKPHOS 182* 183* 149*     Troponin:   No results for input(s): TROPONINI in the last 72 hours. BNP: No results for input(s): BNP in the last 72 hours. Lipids:   Recent Labs     09/20/21  0508   TRIG 140     ABGs:   Recent Labs     09/21/21  0451   PHART 7.202*   PO2ART 71.4*   EYW7ZGC 41.0     INR:   Recent Labs     09/19/21  0509 09/20/21  0508 09/21/21  0444   INR 2.81* 2.60* 2.68*       IMAGING:  XR CHEST PORTABLE   Final Result   Worsening bilateral interstitial opacities. Line and tubes as above. XR CHEST PORTABLE   Final Result   Line and tubes as above. Low lung volumes. Bilateral pulmonary interstitial opacities, representing   infection. XR ABDOMEN (2 VIEWS)   Final Result   Continued distention of small and large bowel similar to the previous exam   suggesting a severe ileus         CT ABDOMEN PELVIS WO CONTRAST Additional Contrast? None   Final Result   1. Compared to 09/15/2021, no significant interval changes appreciated. 2. Liver cirrhosis with mild to moderate volume ascites. Moderate anasarca. 3. Prominent gaseous and fluid distension of the colon, grossly similar as of   09/15/2021.   4. Cholelithiasis and/or sludge in the gallbladder lumen. XR ABDOMEN (KUB) (SINGLE AP VIEW)   Final Result   Moderate persistent diffuse small bowel distension without evident colonic   distension. Distal small bowel obstruction could be present. RECOMMENDATION:   Continued follow-up with serial films suggested. Repeat CT scan suggested if   patient not improving. XR CHEST PORTABLE   Final Result   Slightly improved aeration in the lower lung zones bilaterally. CT ABDOMEN PELVIS WO CONTRAST Additional Contrast? None   Final Result   1. Diffuse gastrointestinal distension, likely due to ileus   2. Fluid throughout the colon and rectum. Correlate with any history of   diarrhea. No evident colonic wall thickening to indicate colitis   3. Cirrhosis with small ascites   4. Stones versus sludge in the gallbladder   5. Atelectasis or pneumonia in the lung bases with small left pleural effusion         XR CHEST PORTABLE   Final Result   Low lung volumes with bibasilar effusions. Support tubes as described above. XR CHEST PORTABLE   Final Result   Bibasilar hypoaeration again noted with persistent left basilar opacity   suggesting atelectasis         IR US GUIDED PARACENTESIS   Final Result   Successful ultrasound guided paracentesis. US GALLBLADDER RUQ   Final Result   Gallbladder wall is thickened. This is a nonspecific finding in the presence   of ascites as it may simply be reactive, rather than secondary to underlying   cholecystitis      Low level echoes are seen within the gallbladder, either stones or sludge. Cirrhosis with moderate ascites         XR CHEST PORTABLE   Final Result   New central venous catheter as above, approach is unclear. CT ABDOMEN PELVIS WO CONTRAST Additional Contrast? None   Final Result   1.  Hepatic cirrhosis with associated portal venous hypertension and scattered   moderate intraperitoneal free fluid. 2. Diffuse gallbladder wall thickening suggesting association with chronic   versus acute cholecystitis. 3. Suspected gallbladder lumen dependent radiodense sludge. 4. Mild cardiomegaly. CT CHEST WO CONTRAST   Final Result   1. Hepatic cirrhosis with associated portal venous hypertension and scattered   moderate intraperitoneal free fluid. 2. Diffuse gallbladder wall thickening suggesting association with chronic   versus acute cholecystitis. 3. Suspected gallbladder lumen dependent radiodense sludge. 4. Mild cardiomegaly. CT Head WO Contrast   Final Result   No acute intracranial abnormality. XR CHEST PORTABLE   Final Result   Endotracheal tube and orogastric tube are acceptable. Bibasilar opacities are again noted. XR CHEST PORTABLE   Final Result   Reticulation at the left base more than right is nonspecific. Could relate   to subsegmental atelectasis, aspiration pneumonitis, or early pneumonia. Assessment/Plan :      1. CHAO 2/2 ATN from hypotension / shock. Anemia   Renal function worsened   Has ATN 2/2 hypotension   Gave  albumin and lasix - no response   Severe ATN     High risk for line placement because of thrombocytopenia   Because of hemodynamic instability may not be able to tolerate CRRT     Prognosis poor because of liver failure     Recommend to dose adjust all medications  based on renal functions  Maintain SBP> 90 mmHg   Daily weights   AVOID NSAIDs  Avoid Nephrotoxins  Monitor Intake/Output  Call if significant decrease in urine output     2. Now developed hypernatremia   Added free water flushes   Sodium level better   Continue to monitor. 3.  Hypotension/ shock state   On levophed and vasopressin   Give albumin as needed     4. High anion gap acidosis. Resolved. 5. Monitor electrolytes     6.  Hepatic failure/Encephalopathy   2/2 ETOH abuse   Now has multiorgan failure       D/w Dr. Katie Funk today   At this point doing dialysis catheter placement and CRRT is high risk procedure and there are no long term survival benefit of doing CRRT because of underlying liver failure. He may not be able to tolerate CRRT.        Poor prognosis       Thank you for allowing us to participate in care of Micah Fall         Electronically signed by: Ina Quiroga MD, 9/21/2021, 10:02 AM      [facility] [address]  Office : [phone]  Fax :[phone]

## 2021-09-21 NOTE — CONSULTS
Palliative Care:    a 55 y.o. adult with history of cirrhosis who apparently presented with family having been found by family poorly responsive who initiated CPR. EMS was called and on arrival to the ER patient was intubated. Post intubation he developed hypotension requiring fluid resuscitation with some improvement. Laboratory work-up was noted for ammonia of 136, lipase of 166, troponin of 0.10, sodium 122, potassium 2.9, CO2 of 18, creatinine of 2.0 and transaminitis with , . CT chest abdomen pelvis revealed cirrhosis with portal hypertension and diffuse gallbladder thickening concerning for acute versus chronic cholecystitis with gallbladder sludge  CT brain with no acute intracranial pathology  EKG sinus rhythm. Urinalysis negative. He is notably jaundiced. Patient admitted 9/10/21 and Palliative consult placed 9/15/21. Past Medical History:   has no past medical history on file. Past Surgical History:   has a past surgical history that includes Upper gastrointestinal endoscopy (N/A, 9/13/2021) and Colonoscopy (N/A, 9/13/2021). Advance Directives:   Full Code. Problem Severity: Pain/Other Symptoms:    cirrhosis of liver. Ventilator dependent. Bed Mobility/Toileting/Transfer:  Full care since admit. Performance Status:        Palliative Performance Scale:  100% []Full Normal activity & work No evidence of disease  90%   [] Full Normal activity & work Some evidence of disease  80%   [] Full Normal activity with Effort Some evidence of disease  70%   [] Reduced Unable Normal Job/Work Significant disease Full Normal or reduced  60%   [] Ambulation reduced; Significant disease; Can't do hobbies/housework; intake normal   or reduced; occasional assist; LOC full/confusion  50%   [] Mainly sit/lie;  Extensive disease; Can't do any work; Considerable assist; intake normal  Or reduced; LOC full/confusion  40%   [] Mainly in bed; Extensive disease; Mainly assist; intake normal or reduced; occasional assist; LOC full/confusion  30%   [] Bed Bound; Extensive disease; Total care; intake reduced; LOC full/confusion  20%   [] Bed Bound; Extensive disease; Total care; intake minimal; Drowsy/coma  10%   [x] Bed Bound; Extensive disease; Total care; Mouth care only; Drowsy/coma    PPS 10%  Symptom Assessment: Appetite/Nausea/Bowels/Fatigue:    lbs. Social History:       Family History:  family history is not on file. Psychological/Spiritual:    . Wife/children reside in [place]. Family Discussion:     Family meeting with patient brother, brother in law and (10) other extended members of family. Several with phone/photo support. Use of  in place as wife is residing outside of [facility]. Dr. Abiodun Silva and Dr. Karly Meza present with Palliative. Detailed update of current status provided to family and grave prognosis. Patient not showing any improvement. Discussed extubation and to initiate comfort care measures. All family verbalize understanding of information shared and agree to proceed. Family in agreement for spiritual support.  notified and in contact with [place] speaking [place]. En route at this time to provide support prior to extubation. Palliative provided education on process for extubation. Will continue to support family as needed. POC comfort measures until patient expires.

## 2021-09-21 NOTE — PROGRESS NOTES
Patient extubated per family request at 16:16, family at bedside.  Will continue to monitor patient and provide family support

## 2021-09-21 NOTE — PROGRESS NOTES
Hospitalist perfect served     Pt on the Vent. Septic from mold in lungs. Liver failure. Titrating Levo to systolic 90 or better, Iv gone up to 40 now to maintain, would you like to add vaso?    Thank you     Replied Yes    Order for Vaso see [facility]

## 2021-09-21 NOTE — PROGRESS NOTES
Shift assessment complete. Pt is intubated and sedated. ETT in place size 8, 25 at the lip. OG in place 55 at the lip. Propofol at 20 mcg/kg/min, Fentanyl at 50 mcg/hr. PERRLA. RASS -3   Lung sounds noted to be Diminished. Current Vent Settings: AV/VC RATE 14. VOLUME 500. PEEP 5. 30% FIO2. Pt is NSR per monitor. No adventitious heart sounds noted. Bowel sounds Present. Radial and pedal pulses present. Peterson in place draining scant amount of urine decreasing daily. IVs patent and flushing well with  blood return. Pt being turned Q2H to prevent skin breakdown, pillow support provided. Bed locked. Lowered. Rails x 2. Bed alarm on. Call light in reach.

## 2021-09-21 NOTE — PROGRESS NOTES
Gastroenterology Progress Note            Thelma Connolly is a 55 y.o. male patient. 1. Hepatic encephalopathy ([address]    2. Gastrointestinal hemorrhage, unspecified gastrointestinal hemorrhage type    3. Thickening of wall of gallbladder    4. Liver failure with hepatic coma, unspecified chronicity (HCC)        SUBJECTIVE:  Pt intubated, sedated, critically ill. Physical    VITALS:  BP (!) 101/46   Pulse 91   Temp 99.8 °F (37.7 °C) (Axillary)   Resp 14   Ht 5' 5\" (1.651 m)   Wt 190 lb 11.2 oz (86.5 kg)   SpO2 96%   BMI 31.73 kg/m²   TEMPERATURE:  Current - Temp: 99.8 °F (37.7 °C); Max - Temp  Av.6 °F (36.4 °C)  Min: 96.2 °F (35.7 °C)  Max: 99.8 °F (37.7 °C)    Abdomen soft, ND , no HSM, Bowel sounds present but hypoactive  Intubated sedated. Trace edema.   jaundice    Data      Recent Labs     21  05021  0745   WBC 25.4* 23.7*   HGB 8.4* 8.6*   HCT 24.7* 25.5*   MCV 93.9 94.5   PLT 39* 37*     Recent Labs     21  05021  0508 21  0445   * 147* 142   K 3.5 4.5 4.5   * 116* 111*   CO2 19* 17* 16*   PHOS 3.7 4.4 4.8   BUN 71* 79* 87*   CREATININE 1.1 1.9* 2.8*     Recent Labs     21  05021  0508 21  0445   * 116* 176*   ALT 96* 91* 93*   BILITOT 27.8* 24.5* 25.3*   ALKPHOS 182* 183* 149*     No results for input(s): LIPASE, AMYLASE in the last 72 hours. ASSESSMENT   1. Alcoholic cirrhosis-  UC declined to eval for transplant. 2. Hepatic encephalopathy- ammonia improved. Pt on xifaxan. Lactulose held d/t ileus/abdominal distention. 3. Ascites-  SAAG c/w portal hypertension. Neg for sbp or neutrocytic ascites. 4. Leukocytosis- decreased today. 5. CHAO - cr worsened today. 6. Hypernatremia-  7. Hematochezia from hemorrhoid  8. Anemia-  hgb stable now in the 8's   9. Ileus. Ab distention. Decreased bowel sounds. AXR  with stable small bowel and colon distention c/w ileus.    10. Respiratory failure. -  On vent. Has fungal infection in lungs also. PLAN    1. Cont Abx and antifungals  2. Vent and pressor support  3. Follow hgb  4. Cont xifaxan. 5. Tap water enemas q6 hours and dulcolax x1 for ileus. 6. Turn side to side in bed q2 hours for ileus     Discussed with Dr. Samantha Zuniga, JOELLE  [facility]       I have personally performed a face to face diagnostic evaluation on this patient. I have interviewed and examined the patient and I agree with the findings and recommended plan of care. In summary, my findings and plan are the following:   abd still distended but KUB shows dilated small and large bowel c/w ileus. Minimal bowel sounds. +tympany. Still critically ill. Cont supportive care, tap water enemas, abx, optimize electrolytes, frequent turning. TPN.        Dk Ace MD  [address]

## 2021-09-21 NOTE — PROGRESS NOTES
09/21/21 0804   Vent Patient Data   Plateau Pressure 22 UVQ35   Static Compliance 32.6 mL/cmH2O   Dynamic Compliance 34.6 mL/cmH2O

## 2021-09-21 NOTE — PLAN OF CARE
Problem: Non-Violent Restraints  Goal: Removal from restraints as soon as assessed to be safe  Outcome: Ongoing  Goal: No harm/injury to patient while restraints in use  Outcome: Ongoing  Goal: Patient's dignity will be maintained  Outcome: Ongoing     Problem: Nutrition  Goal: Optimal nutrition therapy  Outcome: Ongoing     Problem: Skin Integrity:  Goal: Will show no infection signs and symptoms  Description: Will show no infection signs and symptoms  Outcome: Ongoing  Goal: Absence of new skin breakdown  Description: Absence of new skin breakdown  Outcome: Ongoing

## 2021-09-21 NOTE — PROGRESS NOTES
[facility] referral number [address] [place]  Per [facility] please hold body until [facility] call back after contacting family

## 2021-09-22 LAB
ASPERGILLUS GALACTO AG: NEGATIVE
ASPERGILLUS GALACTO INDEX: 0.1

## 2021-09-22 NOTE — PROGRESS NOTES
Post mortem care provided. Check list completed. Security, Switch board, Pt access all notified. Awaiting Transport to [place].

## 2021-09-22 NOTE — PROGRESS NOTES
This RN called [facility] back and spoke with [facility]. Per [facility] 's body is okay to be taken to the morgue but not okay to release his body to a  home at this time. [facility] center in the process of talking with family, and markell stated she has been in contact with [facility] security and they are aware of plan.

## 2021-09-22 NOTE — PROGRESS NOTES
Linh from [facility] called to see if family is at bedside, asked to be called back after family leaves for the night.

## 2021-09-23 LAB
ASPERGILLUS ANTIBODY CF: NORMAL
BLASTOMYCES AB BY EIA, SERUM: 0.3 IV
COCCIDIOIDES ANTIBODY CF: NORMAL
HISTOPLASMA ANTIBODY MYCELIAL CF: NORMAL
HISTOPLASMA ANTIBODY YEAST CF: NORMAL

## 2021-09-24 LAB
BLOOD CULTURE, ROUTINE: NORMAL
CULTURE, BLOOD 2: NORMAL

## 2021-09-25 LAB
ASPERGILLUS ANTIBODY ID: NORMAL
BLASTOMYCES ANTIBODY ID: NORMAL
COCCIDIOIDES ANTIBODY ID: NORMAL
HISTOPLASMA ABS, ID: NORMAL

## 2021-09-30 LAB
CULTURE, RESPIRATORY: ABNORMAL
GRAM STAIN RESULT: ABNORMAL
ORGANISM: ABNORMAL
ORGANISM: ABNORMAL

## 2021-10-13 NOTE — DISCHARGE SUMMARY
[facility] Medicine  Death/Discharge Summary    Severo Koh       :  1965              MRN:  [id number]    Admit date:  2021    Discharge date:  2021    Admitting Physician:  Cyndi Benton MD  Discharge Physician: Yaa Iqbal MD        Admission Condition:  serious    Discharged Condition:      History of Present Illness: Chief Complaint:           Altered Mental Status       pt brought to ed by [facility] after being found by family sitting in chair unresponsive but jill, bs for squad was 79 1 amp given in route patient moaning at [facility] Course:       No family at bedside to provide collateral history.  History obtained from discussion with ER provider  Apparently file still being managed and has presented by different names in the past     The patient is a unknown y.o. adult with history of cirrhosis who apparently presented with family having been found by family poorly responsive who initiated CPR.  EMS was called and on arrival to the ER patient was intubated.  Post intubation he developed hypotension requiring fluid resuscitation with some improvement.   Laboratory work-up was noted for ammonia of 136, lipase of 166, troponin of 0.10, sodium 122, potassium 2.9, CO2 of 18, creatinine of 2.0 and transaminitis with ,   CT chest abdomen pelvis revealed cirrhosis with portal hypertension and diffuse gallbladder thickening concerning for acute versus chronic cholecystitis with gallbladder sludge  CT brain with no acute intracranial pathology  EKG sinus rhythm  Urinalysis negative  He is notably jaundiced    Intubated sedated, FiO2 30%, still on Levophed 4 mics, LFTs worsening, hypernatremia, creatinine improving 1.7 ammonia improving 55     Patient is intubated and sedated, currently FiO2 30%, on Levophed at 16 mics, worsening creatinine 1.9, nephrology on board, given albumin and Lasix, no urgent RRT , abdominal distention KUB pending     Acute respiratory failure/pneumonia  Secondary to hepatic encephalopathy and ascites. He remains intubated and sedated, CCM on board    Growing stenotrophomonas in sputum currently on Levaquin     Hepatic encephalopathy  Appears to be end-stage cirrhosis at this time or at minimum decompensated cirrhosis. Ammonia 72>86>76>60, on lactulose and rifaximin, GI on board.     Hypernatremia-sodium 152>149, nephrology on board, increase free water to 250 mill every 6hr      Elevated liver functions and jaundice  Appears to be related to alcoholic cirrhosis  Unclear if the patient has any hepatitis history. GI is following     Diffuse gastrointestinal distention likely due to ileus, on abdominal x-ray, NG tube, rectal tube per GI, can give lactulose rectal, repeated KUB 9/18 with moderate persistent diffuse small bowel distention without evident colonic distention,? Distal small bowel obstruction, discussed with critical care medicine, CT abdomen noted, lactic acid one-point 4 GI holding lactulose. Given tap water enema     Acute metabolic acidosis  Appears to be a combination of acute kidney injury possible sepsis lactic acidosis and decompensated cirrhosis. We are working to correct the patient's abnormalities  Nephrology is following as well, had albumin, no urgent RRT   Creatinine 3.4>1.7> 1.9 trending down     Acute kidney injury, worsening creatinine 1.9  Secondary to all of the above, Cr 3.4>2.7>2.3  Nephrology is following as noted above  Continue to monitor renal function  Nephrologist given Lasix and albumin, no urgent need for RRT     Anemia with possible GI bleed  Patient has been started on octreotide  He is on PPI as well  Gastroenterology has been consulted for his cirrhosis as well as for the possible bleed.   GI has determined that this is secondary to hemorrhoids General surgery was consulted it would be more dangerous to do a surgery for the hemorrhoid then it would be to just allow it to ooze a little bit. Banding would also be more dangerous      Thrombocytopenia  Secondary to liver disease    Discharge Physical Exam:    Called by nurse that patient had become asystole on telemetry. Patient seen and examined. No palpable pulse. Pupils fixed and dilated. No cardiac or pulmonary activity. Patient pronounced dead. Time of Death: 18:26    Cause of Death: multisystem organ failure due to alcoholism and end stage liver disease.         Disposition:   [facility]    Time spent on Discharged is  30 minutes      Signed:  Christiano Flores MD, MD  10/13/2021, 3:26 PM

## 2024-05-10 NOTE — PROGRESS NOTES
Soap and water bath provided, with CHG solution for skin. Complete linen and gown change. Hair washed and combed. Face washed. Bettina care and johnson care provided. Powder applied. Pt has small, pin-point sized open, bleeding skin to testicles. Testicles are taut and swollen. Pulled up and repositioned for comfort. Detail Level: Detailed Instructions (Optional): Given cyst is currently inflamed and patient is going to start doxycycline, recommend waiting 4-6 weeks to do excision, if desired by patient Size Of Lesion In Cm (Optional): 0 Procedure To Be Performed At Next Visit: Excision Introduction Text (Please End With A Colon): The following procedure was deferred:

## (undated) DEVICE — SET VLV 3 PC AWS DISPOSABLE GRDIAN SCOPEVALET

## (undated) DEVICE — SOLUTION IV IRRIG WATER 500ML POUR BRL ST 2F7113

## (undated) DEVICE — BW-412T DISP COMBO CLEANING BRUSH: Brand: SINGLE USE COMBINATION CLEANING BRUSH

## (undated) DEVICE — MOUTHPIECE ENDOSCP L CTRL OPN AND SIDE PORTS DISP

## (undated) DEVICE — GOWN AURORA NONREINF LG: Brand: MEDLINE INDUSTRIES, INC.

## (undated) DEVICE — PROCEDURE KIT ENDOSCP CUST